# Patient Record
Sex: MALE | Race: WHITE | NOT HISPANIC OR LATINO | Employment: FULL TIME | URBAN - METROPOLITAN AREA
[De-identification: names, ages, dates, MRNs, and addresses within clinical notes are randomized per-mention and may not be internally consistent; named-entity substitution may affect disease eponyms.]

---

## 2020-09-18 DIAGNOSIS — R10.9 ABDOMINAL PAIN: Primary | ICD-10-CM

## 2020-09-29 ENCOUNTER — OFFICE VISIT (OUTPATIENT)
Dept: UROLOGY | Facility: CLINIC | Age: 55
End: 2020-09-29
Payer: MEDICAID

## 2020-09-29 VITALS
WEIGHT: 232 LBS | RESPIRATION RATE: 20 BRPM | HEART RATE: 96 BPM | DIASTOLIC BLOOD PRESSURE: 88 MMHG | HEIGHT: 68 IN | SYSTOLIC BLOOD PRESSURE: 148 MMHG | BODY MASS INDEX: 35.16 KG/M2

## 2020-09-29 DIAGNOSIS — N28.9 RENAL LESION: ICD-10-CM

## 2020-09-29 DIAGNOSIS — R30.0 DYSURIA: ICD-10-CM

## 2020-09-29 DIAGNOSIS — R31.9 HEMATURIA, UNSPECIFIED TYPE: Primary | ICD-10-CM

## 2020-09-29 LAB — POC RESIDUAL URINE VOLUME: 6 ML (ref 0–100)

## 2020-09-29 PROCEDURE — 51798 US URINE CAPACITY MEASURE: CPT | Mod: S$GLB,,, | Performed by: UROLOGY

## 2020-09-29 PROCEDURE — 51798 POCT BLADDER SCAN: ICD-10-PCS | Mod: S$GLB,,, | Performed by: UROLOGY

## 2020-09-29 PROCEDURE — 99204 PR OFFICE/OUTPT VISIT, NEW, LEVL IV, 45-59 MIN: ICD-10-PCS | Mod: S$GLB,,, | Performed by: UROLOGY

## 2020-09-29 PROCEDURE — 99204 OFFICE O/P NEW MOD 45 MIN: CPT | Mod: S$GLB,,, | Performed by: UROLOGY

## 2020-09-29 RX ORDER — SERTRALINE HYDROCHLORIDE 25 MG/1
TABLET, FILM COATED ORAL
COMMUNITY
Start: 2020-09-15 | End: 2021-01-25

## 2020-09-29 RX ORDER — AMOXICILLIN AND CLAVULANATE POTASSIUM 875; 125 MG/1; MG/1
1 TABLET, FILM COATED ORAL EVERY 12 HOURS
Qty: 14 TABLET | Refills: 0 | Status: SHIPPED | OUTPATIENT
Start: 2020-09-29 | End: 2020-12-08

## 2020-09-29 RX ORDER — ATORVASTATIN CALCIUM 20 MG/1
TABLET, FILM COATED ORAL
COMMUNITY
Start: 2020-09-15 | End: 2021-03-22

## 2020-09-29 RX ORDER — CEFTRIAXONE 500 MG/1
1 INJECTION, POWDER, FOR SOLUTION INTRAMUSCULAR; INTRAVENOUS
Status: COMPLETED | OUTPATIENT
Start: 2020-09-29 | End: 2020-09-29

## 2020-09-29 RX ORDER — GENTAMICIN SULFATE 40 MG/ML
80 INJECTION, SOLUTION INTRAMUSCULAR; INTRAVENOUS
Status: COMPLETED | OUTPATIENT
Start: 2020-09-29 | End: 2020-09-29

## 2020-09-29 RX ORDER — PANTOPRAZOLE SODIUM 20 MG/1
TABLET, DELAYED RELEASE ORAL
COMMUNITY
Start: 2020-09-15 | End: 2023-01-27

## 2020-09-29 RX ADMIN — GENTAMICIN SULFATE 80 MG: 40 INJECTION, SOLUTION INTRAMUSCULAR; INTRAVENOUS at 11:09

## 2020-09-29 RX ADMIN — CEFTRIAXONE 1 G: 500 INJECTION, POWDER, FOR SOLUTION INTRAMUSCULAR; INTRAVENOUS at 11:09

## 2020-09-29 NOTE — PROGRESS NOTES
Subjective:       Patient ID: Chucho Mack is a 55 y.o. male.    Chief Complaint: Hematuria (C/O hematuria and lower abdominal pain since January)      HPI: 5-year-old male new to the service of Dr. Cobb for evaluation of abdominal pain and gross hematuria.  Patient states he has been passing bright red blood and blood clots since April of 2020.  At that time he was throughout the entire urinary stream.  Currently his stream starts clear but at the end he sees blood and blood clots.  He presented to [facility] last week a CT scan was completed and patient was told he had a lesion on the kidney.  He denies any pain with urination but does report low abdominal pain approximately 2 in midline below the umbilicus.  Bowels are working well.  Patient has no known family history of prostate cancer.  He works as a .  He denies any previous exam involving a YESI/PSA.  He was recently treated for a UTI in completed antibiotics as prescribed.  No unexplained weight loss or new onset bone pain.         Past Medical History:   Past Medical History:   Diagnosis Date    Anxiety     GERD (gastroesophageal reflux disease)     Hypercholesterolemia     Paranoid schizophrenia        Past Surgical Historical:   Past Surgical History:   Procedure Laterality Date    ANKLE SURGERY      HAND SURGERY          Medications:   Medication List with Changes/Refills   Current Medications    ATORVASTATIN (LIPITOR) 20 MG TABLET        PANTOPRAZOLE (PROTONIX) 20 MG TABLET        SERTRALINE (ZOLOFT) 25 MG TABLET            Past Social History:   Social History     Socioeconomic History    Marital status:      Spouse name: Not on file    Number of children: Not on file    Years of education: Not on file    Highest education level: Not on file   Occupational History    Not on file   Social Needs    Financial resource strain: Not on file    Food insecurity     Worry: Not on file     Inability: Not  on file    Transportation needs     Medical: Not on file     Non-medical: Not on file   Tobacco Use    Smoking status: Current Every Day Smoker    Smokeless tobacco: Never Used   Substance and Sexual Activity    Alcohol use: Not Currently    Drug use: Not on file    Sexual activity: Not on file   Lifestyle    Physical activity     Days per week: Not on file     Minutes per session: Not on file    Stress: Not on file   Relationships    Social connections     Talks on phone: Not on file     Gets together: Not on file     Attends Synagogue service: Not on file     Active member of club or organization: Not on file     Attends meetings of clubs or organizations: Not on file     Relationship status: Not on file   Other Topics Concern    Not on file   Social History Narrative    Not on file       Allergies:   Review of patient's allergies indicates:   Allergen Reactions    Iodine and iodide containing products         Family History: History reviewed. No pertinent family history.     Review of Systems:  Review of Systems   Constitutional: Negative for activity change and appetite change.   HENT: Negative for congestion and dental problem.    Eyes: Negative for visual disturbance.   Respiratory: Negative for chest tightness and shortness of breath.    Cardiovascular: Negative for chest pain.   Gastrointestinal: Positive for abdominal pain. Negative for abdominal distention.   Genitourinary: Positive for hematuria. Negative for decreased urine volume, difficulty urinating, discharge, dysuria, enuresis, flank pain, frequency, genital sores, penile pain, penile swelling, scrotal swelling, testicular pain and urgency.   Musculoskeletal: Negative for back pain and neck pain.   Skin: Negative for color change.   Neurological: Negative for dizziness.   Hematological: Negative for adenopathy.   Psychiatric/Behavioral: Negative for agitation, behavioral problems and confusion.       Physical Exam:  Physical Exam    Constitutional: He is oriented to person, place, and time. He appears well-developed.   HENT:   Head: Normocephalic.   Eyes: No scleral icterus.   Neck: Normal range of motion.   Pulmonary/Chest: Effort normal and breath sounds normal.   Abdominal: Soft. He exhibits no distension. There is no abdominal tenderness. No hernia. Hernia confirmed negative in the right inguinal area and confirmed negative in the left inguinal area.   Genitourinary:    Testes and penis normal.   Cremasteric reflex is present.   Neurological: He is alert and oriented to person, place, and time.   Skin: Skin is warm and dry.         Assessment/Plan:   Gross hematuria/blood clots--urinalysis WBC 10-20, RBC TNTC, 2+ bacteria nitrite negative.  Culture same.  Will obtain a copy of the recent CT scan completed at [facility].  Gentamicin 80 mg IM/Rocephin 1 g IM now.  Will start patient on Augmentin b.i.d. x7 days.  Patient is scheduled for cystoscopy Friday this week in the care of Dr. Cobb.  Post completion of that pending those findings that on follow-up patient will have a PSA and YESI      Problem List Items Addressed This Visit     None      Visit Diagnoses     Hematuria, unspecified type    -  Primary    Relevant Orders    POCT Urinalysis (w/Micro Option)    POCT Bladder Scan

## 2020-09-29 NOTE — PROGRESS NOTES
56 yo male with gross hematuria.  Pt seen chart reviewed case discussed with shagufta.  Will give shot of gent and rocephin.  Place on oral antibiotic while culture is being completed.  Will track down ct results and schedule cysto for friday

## 2020-09-29 NOTE — PROGRESS NOTES
Patient given injection of Rocephin 1 gram Im to Left Glut. Tolerated without c/o  Patient given injection of Gentamicin 80mg IM to right glut. Tolerated without c/o      Noncontrast CT scan report received around 12:30 p.m. this day--identifies a partially calcified soft tissue intraluminal lesion of the urinary bladder.  Malignancy leads differential.  Patient is already scheduled for cystoscopy Friday this week.  2.  Heterogeneously enhancing/complex 2.2 cm lesion of the right kidney.  Again malignancy cannot be ruled out by this study.  3.  Bilateral renal cyst.  Hepatic stenosis and colonic diverticular disease.      Patient will be scheduled for CT renal mass protocol

## 2020-10-02 ENCOUNTER — TELEPHONE (OUTPATIENT)
Dept: UROLOGY | Facility: CLINIC | Age: 55
End: 2020-10-02

## 2020-10-02 NOTE — TELEPHONE ENCOUNTER
----- Message from Heena Childress sent at 9/30/2020  9:55 AM CDT -----  Regarding: call back  Dr. Guerrero has called in regards of ptAdela Mack and need staff to call him back regarding this pt. [phone] call no later 11:45 am  or will be available after 4:00 pm today

## 2020-12-07 ENCOUNTER — TELEPHONE (OUTPATIENT)
Dept: UROLOGY | Facility: CLINIC | Age: 55
End: 2020-12-07

## 2020-12-08 ENCOUNTER — PROCEDURE VISIT (OUTPATIENT)
Dept: UROLOGY | Facility: CLINIC | Age: 55
End: 2020-12-08
Payer: MEDICAID

## 2020-12-08 VITALS
BODY MASS INDEX: 34.1 KG/M2 | HEIGHT: 68 IN | OXYGEN SATURATION: 98 % | DIASTOLIC BLOOD PRESSURE: 100 MMHG | RESPIRATION RATE: 18 BRPM | WEIGHT: 225 LBS | SYSTOLIC BLOOD PRESSURE: 178 MMHG | HEART RATE: 90 BPM

## 2020-12-08 DIAGNOSIS — R31.9 HEMATURIA, UNSPECIFIED TYPE: Primary | ICD-10-CM

## 2020-12-08 PROCEDURE — 52000 CYSTOURETHROSCOPY: CPT | Mod: S$GLB,,, | Performed by: UROLOGY

## 2020-12-08 PROCEDURE — 52000 CYSTOSCOPY: ICD-10-PCS | Mod: S$GLB,,, | Performed by: UROLOGY

## 2020-12-08 RX ORDER — GENTAMICIN SULFATE 40 MG/ML
80 INJECTION, SOLUTION INTRAMUSCULAR; INTRAVENOUS
Status: COMPLETED | OUTPATIENT
Start: 2020-12-08 | End: 2020-12-08

## 2020-12-08 RX ORDER — CIPROFLOXACIN 500 MG/1
500 TABLET ORAL
Status: COMPLETED | OUTPATIENT
Start: 2020-12-08 | End: 2020-12-08

## 2020-12-08 RX ORDER — CEFTRIAXONE 1 G/1
1 INJECTION, POWDER, FOR SOLUTION INTRAMUSCULAR; INTRAVENOUS
Status: DISCONTINUED | OUTPATIENT
Start: 2020-12-08 | End: 2020-12-08

## 2020-12-08 RX ORDER — CEFTRIAXONE 500 MG/1
1000 INJECTION, POWDER, FOR SOLUTION INTRAMUSCULAR; INTRAVENOUS
Status: COMPLETED | OUTPATIENT
Start: 2020-12-08 | End: 2020-12-08

## 2020-12-08 RX ADMIN — CEFTRIAXONE 1000 MG: 500 INJECTION, POWDER, FOR SOLUTION INTRAMUSCULAR; INTRAVENOUS at 09:12

## 2020-12-08 RX ADMIN — CIPROFLOXACIN 500 MG: 500 TABLET ORAL at 08:12

## 2020-12-08 RX ADMIN — GENTAMICIN SULFATE 80 MG: 40 INJECTION, SOLUTION INTRAMUSCULAR; INTRAVENOUS at 09:12

## 2020-12-08 NOTE — PROCEDURES
"Cystoscopy    Date/Time: 12/8/2020 8:40 AM  Performed by: Artemio Cobb MD  Authorized by: Artemio Cobb MD     Consent Done?:  Yes (Written)  Time out: Immediately prior to procedure a "time out" was called to verify the correct patient, procedure, equipment, support staff and site/side marked as required.    Indications: hematuria    Position:  Supine  Anesthesia:  Intraurethral instillation  Patient sedated?: No    Preparation: Patient was prepped and draped in usual sterile fashion      Scope type:  Flexible cystoscope  Stent inserted: No    Stent removed: No    External exam normal: Yes    Digital exam performed: No    Urethra normal: Yes    Prostate normal: Yes  Bladder neck normal: Bladder neck normal   Bladder normal: No         Number of tumors:  1       Tumor 1:          Size (mm):  7         Anatomy:  Sessile         Location:  Posterior wall    Patient tolerance:  Patient tolerated the procedure well with no immediate complications     Patient also has suspicious renal mass but patient is allergic to iodine.  We gave one gram of rocephin--80 of gent--culture urine send cytology--5 days of cipro--fu Friday if infection has cleared will plan bladder tumor resection next week      "

## 2020-12-08 NOTE — PATIENT INSTRUCTIONS
Cystoscopy    Cystoscopy is a procedure that lets your doctor look directly inside your urethra and bladder. It can be used to:  · Help diagnose a problem with your urethra, bladder, or kidneys.  · Take a sample (biopsy) of bladder or urethral tissue.  · Treat certain problems (such as removing kidney stones).  · Place a stent to bypass an obstruction.  · Take special X-rays of the kidneys.  Based on the findings, your doctor may recommend other tests or treatments.  What is a cystoscope?  A cystoscope is a telescope-like instrument that contains lenses and fiberoptics (small glass wires that make bright light). The cystoscope may be straight and rigid, or flexible to bend around curves in the urethra. The doctor may look directly into the cystoscope, or project the image onto a monitor.  Getting ready  · Ask your doctor if you should stop taking any medicines before the procedure.  · Ask whether you should avoid eating or drinking anything after midnight before the procedure.  · Follow any other instructions your doctor gives you.  Tell your doctor before the exam if you:  · Take any medicines, such as aspirin or blood thinners  · Have allergies to any medicines  · Are pregnant   The procedure  Cystoscopy is done in the doctors office, surgery center, or hospital. The doctor and a nurse are present during the procedure. It takes only a few minutes, longer if a biopsy, X-ray, or treatment needs to be done.  During the procedure:  · You lie on an exam table on your back, knees bent and legs apart. You are covered with a drape.  · Your urethra and the area around it are washed. Anesthetic jelly may be applied to numb the urethra. Other pain medicine is usually not needed. In some cases, you may be offered a mild sedative to help you relax. If a more extensive procedure is to be done, such as a biopsy or kidney stone removal, general anesthesia may be needed.  · The cystoscope is inserted. A sterile fluid is put  into the bladder to expand it. You may feel pressure from this fluid.  · When the procedure is done, the cystoscope is removed.  After the procedure  If you had a sedative, general anesthesia, or spinal anesthesia, you must have someone drive you home. Once youre home:  · Drink plenty of fluids.  · You may have burning or light bleeding when you urinate--this is normal.  · Medicines may be prescribed to ease any discomfort or prevent infection. Take these as directed.  · Call your doctor if you have heavy bleeding or blood clots, burning that lasts more than a day, a fever over 100°F  (38° C), or trouble urinating.  Date Last Reviewed: 1/1/2017  © 3261-2707 [employer]. [address] [place] [address], [place] [address]. All rights reserved. This information is not intended as a substitute for professional medical care. Always follow your healthcare professional's instructions.

## 2020-12-10 ENCOUNTER — TELEPHONE (OUTPATIENT)
Dept: UROLOGY | Facility: CLINIC | Age: 55
End: 2020-12-10

## 2020-12-10 LAB — URINE CULTURE, ROUTINE: NORMAL

## 2020-12-11 ENCOUNTER — OFFICE VISIT (OUTPATIENT)
Dept: UROLOGY | Facility: CLINIC | Age: 55
End: 2020-12-11
Payer: MEDICAID

## 2020-12-11 VITALS — BODY MASS INDEX: 34.1 KG/M2 | RESPIRATION RATE: 20 BRPM | WEIGHT: 225 LBS | HEIGHT: 68 IN

## 2020-12-11 DIAGNOSIS — R31.9 HEMATURIA, UNSPECIFIED TYPE: Primary | ICD-10-CM

## 2020-12-11 DIAGNOSIS — N32.89 BLADDER MASS: ICD-10-CM

## 2020-12-11 DIAGNOSIS — R82.89 URINE CYTOLOGY ABNORMAL: ICD-10-CM

## 2020-12-11 LAB
ANION GAP SERPL CALC-SCNC: 9 MMOL/L (ref 3–11)
APPEARANCE, UA: ABNORMAL
BACTERIA SPEC CULT: ABNORMAL /HPF
BASOPHILS NFR BLD: 0.3 % (ref 0–3)
BILIRUB UR QL STRIP: NEGATIVE MG/DL
BUN SERPL-MCNC: 13 MG/DL (ref 7–18)
BUN/CREAT SERPL: 12.5 RATIO (ref 7–18)
CALCIUM BLD-MCNC: NEGATIVE MG/DL
CALCIUM SERPL-MCNC: 9.5 MG/DL (ref 8.8–10.5)
CHLORIDE SERPL-SCNC: 104 MMOL/L (ref 100–108)
CO2 SERPL-SCNC: 26 MMOL/L (ref 21–32)
COLOR UR: ABNORMAL
COVID-19 AB, IGM: NEGATIVE
CREAT SERPL-MCNC: 1.04 MG/DL (ref 0.7–1.3)
EOSINOPHIL NFR BLD: 1.2 % (ref 1–3)
ERYTHROCYTE [DISTWIDTH] IN BLOOD BY AUTOMATED COUNT: 13.7 % (ref 12.5–18)
GFR ESTIMATION: > 60
GLUCOSE (UA): NORMAL MG/DL
GLUCOSE SERPL-MCNC: 99 MG/DL (ref 70–110)
HCT VFR BLD AUTO: 53.9 % (ref 42–52)
HGB BLD-MCNC: 17.8 G/DL (ref 14–18)
HGB UR QL STRIP: 250 /UL
KETONES UR QL STRIP: NEGATIVE MG/DL
LEUKOCYTE ESTERASE UR QL STRIP: 25 /UL
LYMPHOCYTES NFR BLD: 31 % (ref 25–40)
MCH RBC QN AUTO: 31.8 PG (ref 27–31.2)
MCHC RBC AUTO-ENTMCNC: 33 G/DL (ref 31.8–35.4)
MCV RBC AUTO: 96.4 FL (ref 80–97)
MONOCYTES NFR BLD: 8.7 % (ref 1–15)
NEUTROPHILS # BLD AUTO: 5.97 10*3/UL (ref 1.8–7.7)
NEUTROPHILS NFR BLD: 58.4 % (ref 37–80)
NITRITE UR QL STRIP: NEGATIVE
NUCLEATED RED BLOOD CELLS: 0 %
PATIENT EMPLOYED IN HEALTHCARE: NO
PATIENT HAS SYMPTOMS FOR CONDITION OF INTEREST: NO
PATIENT HOSPITALIZED BC COND: NO
PATIENT IN CONGREGATE CARE: NO
PH UR STRIP: 5 PH (ref 5–9)
PLATELETS: 251 10*3/UL (ref 142–424)
POTASSIUM SERPL-SCNC: 4.5 MMOL/L (ref 3.6–5.2)
PROT UR QL STRIP: 100 MG/DL
RBC # BLD AUTO: 5.59 10*6/UL (ref 4.7–6.1)
RBC #/AREA URNS HPF: ABNORMAL /HPF (ref 0–2)
SERVICE COMMENT 03: ABNORMAL
SODIUM BLD-SCNC: 139 MMOL/L (ref 135–145)
SP GR UR STRIP: 1.02 (ref 1–1.03)
SPECIMEN COLLECTION METHOD, URINE: ABNORMAL
SQUAMOUS EPITHELIAL, UA: ABNORMAL /LPF
UROBILINOGEN UR STRIP-ACNC: NORMAL MG/DL
WBC # BLD: 10.2 10*3/UL (ref 4.6–10.2)
WBC #/AREA URNS HPF: ABNORMAL /HPF (ref 0–5)

## 2020-12-11 PROCEDURE — 99214 OFFICE O/P EST MOD 30 MIN: CPT | Mod: S$GLB,,, | Performed by: NURSE PRACTITIONER

## 2020-12-11 PROCEDURE — 99214 PR OFFICE/OUTPT VISIT, EST, LEVL IV, 30-39 MIN: ICD-10-PCS | Mod: S$GLB,,, | Performed by: NURSE PRACTITIONER

## 2020-12-11 NOTE — PROGRESS NOTES
Subjective:       Patient ID: Chucho Mack is a 55 y.o. male.    Chief Complaint: Hematuria (Cysto F/U)      HPI: 55-year-old male, patient Dr. Cobb, presents for follow-up cysto.  Patient underwent a cysto on 12/08/2020.  Patient presented with complaints of blood in the urine and abdominal pain.  Patient's cysto showed 7 mm mass to the posterior wall.  Patient was given Rocephin and gentamicin and started on Cipro.    Cytology showed urothelial cells in groups.  Patient presents today still having blood in his urine.  Patient states he still has abdominal pain.  Patient denies any fever.  Denies any difficulty voiding.  States his stream is good.  No other urinary complaints.  All other health problems appear stable at this time.       Past Medical History:   Past Medical History:   Diagnosis Date    Anxiety     GERD (gastroesophageal reflux disease)     Hypercholesterolemia     Paranoid schizophrenia        Past Surgical Historical:   Past Surgical History:   Procedure Laterality Date    ANKLE SURGERY      CYSTOSCOPY      HAND SURGERY          Medications:   Medication List with Changes/Refills   Current Medications    ATORVASTATIN (LIPITOR) 20 MG TABLET        PANTOPRAZOLE (PROTONIX) 20 MG TABLET        SERTRALINE (ZOLOFT) 25 MG TABLET            Past Social History:   Social History     Socioeconomic History    Marital status:      Spouse name: Not on file    Number of children: Not on file    Years of education: Not on file    Highest education level: Not on file   Occupational History    Not on file   Social Needs    Financial resource strain: Not on file    Food insecurity     Worry: Not on file     Inability: Not on file    Transportation needs     Medical: Not on file     Non-medical: Not on file   Tobacco Use    Smoking status: Current Every Day Smoker    Smokeless tobacco: Never Used   Substance and Sexual Activity    Alcohol use: Not Currently    Drug use: Not on file     Sexual activity: Not on file   Lifestyle    Physical activity     Days per week: Not on file     Minutes per session: Not on file    Stress: Not on file   Relationships    Social connections     Talks on phone: Not on file     Gets together: Not on file     Attends Latter day service: Not on file     Active member of club or organization: Not on file     Attends meetings of clubs or organizations: Not on file     Relationship status: Not on file   Other Topics Concern    Not on file   Social History Narrative    Not on file       Allergies:   Review of patient's allergies indicates:   Allergen Reactions    Iodine and iodide containing products         Family History: History reviewed. No pertinent family history.     Review of Systems:  Review of Systems   Constitutional: Negative for activity change and appetite change.   HENT: Negative for congestion and dental problem.    Eyes: Negative for visual disturbance.   Respiratory: Negative for chest tightness and shortness of breath.    Cardiovascular: Negative for chest pain.   Gastrointestinal: Positive for abdominal pain. Negative for abdominal distention.   Genitourinary: Positive for hematuria. Negative for decreased urine volume, difficulty urinating, discharge, dysuria, enuresis, flank pain, frequency, genital sores, penile pain, penile swelling, scrotal swelling, testicular pain and urgency.   Musculoskeletal: Negative for back pain and neck pain.   Skin: Negative for color change.   Neurological: Negative for dizziness.   Hematological: Negative for adenopathy.   Psychiatric/Behavioral: Negative for agitation, behavioral problems and confusion.       Physical Exam:  Physical Exam  Vitals signs and nursing note reviewed.   Constitutional:       Appearance: He is well-developed.   HENT:      Head: Normocephalic.   Eyes:      Pupils: Pupils are equal, round, and reactive to light.   Neck:      Musculoskeletal: Normal range of motion and neck supple.    Cardiovascular:      Rate and Rhythm: Normal rate and regular rhythm.      Heart sounds: Normal heart sounds.   Pulmonary:      Effort: Pulmonary effort is normal.      Breath sounds: Normal breath sounds.   Abdominal:      General: Bowel sounds are normal.      Palpations: Abdomen is soft.   Musculoskeletal: Normal range of motion.   Skin:     General: Skin is warm and dry.   Neurological:      Mental Status: He is alert and oriented to person, place, and time.   Psychiatric:         Behavior: Behavior normal.       Urinalysis:  Trace leukocytes, white blood cells too numerous to count, epithelial +1, bacteria +3.  Large blood, blood cells 30-40.    Assessment/Plan:   Bladder mass/hematuria/abnormal cytology:  Will culture the urine.  Patient will continue antibiotics as directed.  Will change antibiotics if indicated.  Patient will need excision of bladder mass.  Patient to be scheduled for surgery.    Follow-up to be arranged.  Problem List Items Addressed This Visit     None      Visit Diagnoses     Hematuria, unspecified type    -  Primary    Relevant Orders    POCT Urinalysis (w/Micro Option)    Urine cytology abnormal        Bladder mass        Relevant Orders    Ambulatory Referral to External Surgery

## 2020-12-11 NOTE — PROGRESS NOTES
Patient seen chart reviewed.  Patient has a large bladder mass and a renal mass.  Will reculture urine and schedule cysto turbt next week

## 2020-12-13 LAB
URINE CULTURE, ROUTINE: NORMAL
URINE CULTURE, ROUTINE: NORMAL

## 2020-12-17 ENCOUNTER — OUTSIDE PLACE OF SERVICE (OUTPATIENT)
Dept: UROLOGY | Facility: CLINIC | Age: 55
End: 2020-12-17

## 2020-12-17 PROCEDURE — 52240 PR CYSTOURETHROSCOPY,FULGUR >5 CM LESN: ICD-10-PCS | Mod: ,,, | Performed by: UROLOGY

## 2020-12-17 PROCEDURE — 52240 CYSTOSCOPY AND TREATMENT: CPT | Mod: ,,, | Performed by: UROLOGY

## 2020-12-18 ENCOUNTER — NURSE TRIAGE (OUTPATIENT)
Dept: ADMINISTRATIVE | Facility: CLINIC | Age: 55
End: 2020-12-18

## 2020-12-18 NOTE — TELEPHONE ENCOUNTER
Patient had surgery yesterday- had tumor removed. Sent home with catheter and bag is leaking. Requesting a new bag. Advised he would have to go to urgent care or ER to have bag replaced. Reports no bowel since surgery yesterday and just took ex-lax. Advised to call us back if laxative does not work.     Reason for Disposition   [1] Catheter is broken AND [2] is not usable    Protocols used: ST URINARY CATHETER SYMPTOMS AND [id number]

## 2020-12-22 ENCOUNTER — OFFICE VISIT (OUTPATIENT)
Dept: UROLOGY | Facility: CLINIC | Age: 55
End: 2020-12-22
Payer: MEDICAID

## 2020-12-22 VITALS — DIASTOLIC BLOOD PRESSURE: 91 MMHG | RESPIRATION RATE: 17 BRPM | SYSTOLIC BLOOD PRESSURE: 145 MMHG

## 2020-12-22 DIAGNOSIS — R30.0 DYSURIA: ICD-10-CM

## 2020-12-22 DIAGNOSIS — N32.89 BLADDER MASS: Primary | ICD-10-CM

## 2020-12-22 PROCEDURE — 99213 PR OFFICE/OUTPT VISIT, EST, LEVL III, 20-29 MIN: ICD-10-PCS | Mod: S$GLB,,, | Performed by: UROLOGY

## 2020-12-22 PROCEDURE — 99213 OFFICE O/P EST LOW 20 MIN: CPT | Mod: S$GLB,,, | Performed by: UROLOGY

## 2020-12-22 RX ORDER — OXYCODONE AND ACETAMINOPHEN 7.5; 325 MG/1; MG/1
TABLET ORAL
COMMUNITY
Start: 2020-12-17 | End: 2021-02-01

## 2020-12-22 NOTE — PROGRESS NOTES
Subjective:       Patient ID: Chucho Mack is a 55 y.o. male.    Chief Complaint: Follow-up      HPI: 54 yo fu sp turbt 8 cm tumor patient has a oneill       Past Medical History:   Past Medical History:   Diagnosis Date    Anxiety     GERD (gastroesophageal reflux disease)     Hypercholesterolemia     Paranoid schizophrenia        Past Surgical Historical:   Past Surgical History:   Procedure Laterality Date    ANKLE SURGERY      CYSTOSCOPY      HAND SURGERY          Medications:   Medication List with Changes/Refills   Current Medications    ATORVASTATIN (LIPITOR) 20 MG TABLET        OXYCODONE-ACETAMINOPHEN (PERCOCET) 7.5-325 MG PER TABLET        PANTOPRAZOLE (PROTONIX) 20 MG TABLET        SERTRALINE (ZOLOFT) 25 MG TABLET            Past Social History:   Social History     Socioeconomic History    Marital status:      Spouse name: Not on file    Number of children: Not on file    Years of education: Not on file    Highest education level: Not on file   Occupational History    Not on file   Social Needs    Financial resource strain: Not on file    Food insecurity     Worry: Not on file     Inability: Not on file    Transportation needs     Medical: Not on file     Non-medical: Not on file   Tobacco Use    Smoking status: Current Every Day Smoker    Smokeless tobacco: Never Used   Substance and Sexual Activity    Alcohol use: Not Currently    Drug use: Not on file    Sexual activity: Not on file   Lifestyle    Physical activity     Days per week: Not on file     Minutes per session: Not on file    Stress: Not on file   Relationships    Social connections     Talks on phone: Not on file     Gets together: Not on file     Attends Islam service: Not on file     Active member of club or organization: Not on file     Attends meetings of clubs or organizations: Not on file     Relationship status: Not on file   Other Topics Concern    Not on file   Social History Narrative    Not on  file       Allergies:   Review of patient's allergies indicates:   Allergen Reactions    Iodine and iodide containing products         Family History: History reviewed. No pertinent family history.     Review of Systems:  Review of Systems   Constitutional: Negative for activity change and appetite change.   HENT: Negative for congestion and dental problem.    Respiratory: Negative for chest tightness and shortness of breath.    Cardiovascular: Negative for chest pain.   Gastrointestinal: Negative for abdominal distention and abdominal pain.   Genitourinary: Negative for decreased urine volume, difficulty urinating, discharge, dysuria, enuresis, flank pain, frequency, genital sores, hematuria, penile pain, penile swelling, scrotal swelling, testicular pain and urgency.   Musculoskeletal: Negative for back pain and neck pain.   Neurological: Negative for dizziness.   Hematological: Negative for adenopathy.   Psychiatric/Behavioral: Negative for agitation, behavioral problems and confusion.       Physical Exam:  Physical Exam  Vitals signs and nursing note reviewed.   Constitutional:       Appearance: He is well-developed.   HENT:      Head: Normocephalic.   Cardiovascular:      Rate and Rhythm: Normal rate and regular rhythm.      Heart sounds: Normal heart sounds.   Pulmonary:      Effort: Pulmonary effort is normal.      Breath sounds: Normal breath sounds.   Abdominal:      General: Bowel sounds are normal.      Palpations: Abdomen is soft.   Skin:     General: Skin is warm and dry.   Neurological:      Mental Status: He is alert and oriented to person, place, and time.     ua from bag has 4 plus bacteria  Path pending    Assessment/Plan:     sp turbt will remove oneill--fu next week  Problem List Items Addressed This Visit     None

## 2020-12-25 LAB — URINE CULTURE, ROUTINE: NORMAL

## 2020-12-28 ENCOUNTER — TELEPHONE (OUTPATIENT)
Dept: UROLOGY | Facility: CLINIC | Age: 55
End: 2020-12-28

## 2020-12-28 NOTE — TELEPHONE ENCOUNTER
----- Message from Jane Rodriguez sent at 12/22/2020  2:44 PM CST -----  Regarding: COVID FU  SURGERY-12/17/2020

## 2020-12-30 ENCOUNTER — OFFICE VISIT (OUTPATIENT)
Dept: UROLOGY | Facility: CLINIC | Age: 55
End: 2020-12-30
Payer: MEDICAID

## 2020-12-30 VITALS
WEIGHT: 225 LBS | SYSTOLIC BLOOD PRESSURE: 152 MMHG | HEIGHT: 68 IN | DIASTOLIC BLOOD PRESSURE: 80 MMHG | RESPIRATION RATE: 16 BRPM | BODY MASS INDEX: 34.1 KG/M2 | HEART RATE: 84 BPM

## 2020-12-30 DIAGNOSIS — R82.90 ABNORMAL URINALYSIS: ICD-10-CM

## 2020-12-30 DIAGNOSIS — C67.9 MALIGNANT NEOPLASM OF URINARY BLADDER, UNSPECIFIED SITE: ICD-10-CM

## 2020-12-30 DIAGNOSIS — N32.89 BLADDER MASS: Primary | ICD-10-CM

## 2020-12-30 PROCEDURE — 99214 PR OFFICE/OUTPT VISIT, EST, LEVL IV, 30-39 MIN: ICD-10-PCS | Mod: S$GLB,,, | Performed by: UROLOGY

## 2020-12-30 PROCEDURE — 99214 OFFICE O/P EST MOD 30 MIN: CPT | Mod: S$GLB,,, | Performed by: UROLOGY

## 2020-12-30 NOTE — PROGRESS NOTES
Subjective:       Patient ID: Chucho Mack is a 55 y.o. male.    Chief Complaint: Bladder mass (1 week f/U)      HPI: 56 yo male fu sp turbt.  Patient with some hematuria.  Patient denies any bowel problems or history of colon cancer.       Past Medical History:   Past Medical History:   Diagnosis Date    Anxiety     GERD (gastroesophageal reflux disease)     Hypercholesterolemia     Paranoid schizophrenia        Past Surgical Historical:   Past Surgical History:   Procedure Laterality Date    ANKLE SURGERY      CYSTOSCOPY      HAND SURGERY          Medications:   Medication List with Changes/Refills   Current Medications    ATORVASTATIN (LIPITOR) 20 MG TABLET        OXYCODONE-ACETAMINOPHEN (PERCOCET) 7.5-325 MG PER TABLET        PANTOPRAZOLE (PROTONIX) 20 MG TABLET        SERTRALINE (ZOLOFT) 25 MG TABLET            Past Social History:   Social History     Socioeconomic History    Marital status:      Spouse name: Not on file    Number of children: Not on file    Years of education: Not on file    Highest education level: Not on file   Occupational History    Not on file   Social Needs    Financial resource strain: Not on file    Food insecurity     Worry: Not on file     Inability: Not on file    Transportation needs     Medical: Not on file     Non-medical: Not on file   Tobacco Use    Smoking status: Current Every Day Smoker    Smokeless tobacco: Never Used   Substance and Sexual Activity    Alcohol use: Not Currently    Drug use: Not on file    Sexual activity: Not on file   Lifestyle    Physical activity     Days per week: Not on file     Minutes per session: Not on file    Stress: Not on file   Relationships    Social connections     Talks on phone: Not on file     Gets together: Not on file     Attends Yazdanism service: Not on file     Active member of club or organization: Not on file     Attends meetings of clubs or organizations: Not on file     Relationship status: Not on  file   Other Topics Concern    Not on file   Social History Narrative    Not on file       Allergies:   Review of patient's allergies indicates:   Allergen Reactions    Iodine and iodide containing products         Family History: History reviewed. No pertinent family history.     Review of Systems:  Review of Systems   Constitutional: Negative for activity change and appetite change.   HENT: Negative for congestion and dental problem.    Eyes: Negative for visual disturbance.   Respiratory: Negative for chest tightness and shortness of breath.    Cardiovascular: Negative for chest pain.   Gastrointestinal: Negative for abdominal distention and abdominal pain.   Genitourinary: Negative for decreased urine volume, difficulty urinating, discharge, dysuria, enuresis, flank pain, frequency, genital sores, hematuria, penile pain, penile swelling, scrotal swelling, testicular pain and urgency.   Musculoskeletal: Negative for back pain and neck pain.   Skin: Negative for color change.   Neurological: Negative for dizziness.   Hematological: Negative for adenopathy.   Psychiatric/Behavioral: Negative for agitation, behavioral problems and confusion.       Physical Exam:  Physical Exam  Vitals signs and nursing note reviewed.   Constitutional:       Appearance: He is well-developed.   HENT:      Head: Normocephalic.   Eyes:      Pupils: Pupils are equal, round, and reactive to light.   Neck:      Musculoskeletal: Normal range of motion and neck supple.   Cardiovascular:      Rate and Rhythm: Normal rate and regular rhythm.      Heart sounds: Normal heart sounds.   Pulmonary:      Effort: Pulmonary effort is normal.      Breath sounds: Normal breath sounds.   Abdominal:      General: Bowel sounds are normal.      Palpations: Abdomen is soft.   Musculoskeletal: Normal range of motion.   Skin:     General: Skin is warm and dry.   Neurological:      Mental Status: He is alert and oriented to person, place, and time.    Psychiatric:         Behavior: Behavior normal.     path muscle invasive adenocarcinoma of bladder      Assessment/Plan:     muscle invasive adenocarcinoma of bladder--will order ct scaan and consult oncology  Problem List Items Addressed This Visit     None      Visit Diagnoses     Bladder mass    -  Primary    Relevant Orders    POCT Urinalysis (w/Micro Option)

## 2020-12-31 ENCOUNTER — TELEPHONE (OUTPATIENT)
Dept: HEMATOLOGY/ONCOLOGY | Facility: CLINIC | Age: 55
End: 2020-12-31

## 2020-12-31 NOTE — TELEPHONE ENCOUNTER
Called patient x 2 cannot leave message. Will continue to try to reach patient regarding appointment and referral.

## 2021-01-01 LAB — URINE CULTURE, ROUTINE: NORMAL

## 2021-01-07 ENCOUNTER — OFFICE VISIT (OUTPATIENT)
Dept: HEMATOLOGY/ONCOLOGY | Facility: CLINIC | Age: 56
End: 2021-01-07
Payer: MEDICAID

## 2021-01-07 ENCOUNTER — TELEPHONE (OUTPATIENT)
Dept: HEMATOLOGY/ONCOLOGY | Facility: CLINIC | Age: 56
End: 2021-01-07

## 2021-01-07 VITALS
WEIGHT: 225.19 LBS | SYSTOLIC BLOOD PRESSURE: 183 MMHG | HEART RATE: 89 BPM | HEIGHT: 68 IN | RESPIRATION RATE: 18 BRPM | DIASTOLIC BLOOD PRESSURE: 120 MMHG | TEMPERATURE: 98 F | OXYGEN SATURATION: 97 % | BODY MASS INDEX: 34.13 KG/M2

## 2021-01-07 DIAGNOSIS — C67.9 MALIGNANT NEOPLASM OF URINARY BLADDER, UNSPECIFIED SITE: ICD-10-CM

## 2021-01-07 DIAGNOSIS — I10 HYPERTENSION, UNSPECIFIED TYPE: Primary | ICD-10-CM

## 2021-01-07 LAB
ALBUMIN SERPL BCP-MCNC: 4.1 G/DL (ref 3.4–5)
ALBUMIN/GLOBULIN RATIO: 1.08 RATIO (ref 1.1–1.8)
ALP SERPL-CCNC: 93 U/L (ref 46–116)
ALT SERPL W P-5'-P-CCNC: 49 U/L (ref 12–78)
ANION GAP SERPL CALC-SCNC: 10 MMOL/L (ref 3–11)
AST SERPL-CCNC: 26 U/L (ref 15–37)
BASOPHILS NFR BLD: 0.4 % (ref 0–3)
BILIRUB SERPL-MCNC: 0.4 MG/DL (ref 0–1)
BUN SERPL-MCNC: 12 MG/DL (ref 7–18)
BUN/CREAT SERPL: 10.08 RATIO (ref 7–18)
CALCIUM SERPL-MCNC: 9.7 MG/DL (ref 8.8–10.5)
CHLORIDE SERPL-SCNC: 104 MMOL/L (ref 100–108)
CO2 SERPL-SCNC: 28 MMOL/L (ref 21–32)
CREAT SERPL-MCNC: 1.19 MG/DL (ref 0.7–1.3)
EOSINOPHIL NFR BLD: 0.9 % (ref 1–3)
ERYTHROCYTE [DISTWIDTH] IN BLOOD BY AUTOMATED COUNT: 13.5 % (ref 12.5–18)
GFR ESTIMATION: > 60
GLOBULIN: 3.8 G/DL (ref 2.3–3.5)
GLUCOSE SERPL-MCNC: 95 MG/DL (ref 70–110)
HCT VFR BLD AUTO: 56.5 % (ref 42–52)
HGB BLD-MCNC: 18.2 G/DL (ref 14–18)
LYMPHOCYTES NFR BLD: 29.4 % (ref 25–40)
MCH RBC QN AUTO: 31.4 PG (ref 27–31.2)
MCHC RBC AUTO-ENTMCNC: 32.2 G/DL (ref 31.8–35.4)
MCV RBC AUTO: 97.4 FL (ref 80–97)
MONOCYTES NFR BLD: 6.4 % (ref 1–15)
NEUTROPHILS # BLD AUTO: 6.16 10*3/UL (ref 1.8–7.7)
NEUTROPHILS NFR BLD: 62.3 % (ref 37–80)
NUCLEATED RED BLOOD CELLS: 0 %
PLATELETS: 248 10*3/UL (ref 142–424)
POTASSIUM SERPL-SCNC: 4.6 MMOL/L (ref 3.6–5.2)
PROT SERPL-MCNC: 7.9 G/DL (ref 6.4–8.2)
RBC # BLD AUTO: 5.8 10*6/UL (ref 4.7–6.1)
SODIUM BLD-SCNC: 142 MMOL/L (ref 135–145)
WBC # BLD: 9.9 10*3/UL (ref 4.6–10.2)

## 2021-01-07 PROCEDURE — 99205 OFFICE O/P NEW HI 60 MIN: CPT | Mod: S$GLB,,, | Performed by: INTERNAL MEDICINE

## 2021-01-07 PROCEDURE — 99205 PR OFFICE/OUTPT VISIT, NEW, LEVL V, 60-74 MIN: ICD-10-PCS | Mod: S$GLB,,, | Performed by: INTERNAL MEDICINE

## 2021-01-07 RX ORDER — AMLODIPINE BESYLATE 10 MG/1
10 TABLET ORAL DAILY
Qty: 30 TABLET | Refills: 11 | Status: SHIPPED | OUTPATIENT
Start: 2021-01-07 | End: 2023-01-27

## 2021-01-07 RX ORDER — HYDROCODONE BITARTRATE AND ACETAMINOPHEN 5; 325 MG/1; MG/1
1 TABLET ORAL EVERY 12 HOURS PRN
Qty: 45 TABLET | Refills: 0 | Status: SHIPPED | OUTPATIENT
Start: 2021-01-07 | End: 2021-03-11 | Stop reason: SDUPTHER

## 2021-01-08 LAB — Lab: 11.7 UG/L (ref 6.5–20.1)

## 2021-01-15 DIAGNOSIS — C67.9 MALIGNANT NEOPLASM OF URINARY BLADDER, UNSPECIFIED SITE: ICD-10-CM

## 2021-01-19 DIAGNOSIS — C26.9 MALIGNANT NEOPLASM OF ILL-DEFINED SITES WITHIN THE DIGESTIVE SYSTEM: ICD-10-CM

## 2021-01-21 ENCOUNTER — TELEPHONE (OUTPATIENT)
Dept: HEMATOLOGY/ONCOLOGY | Facility: CLINIC | Age: 56
End: 2021-01-21

## 2021-01-25 ENCOUNTER — OFFICE VISIT (OUTPATIENT)
Dept: HEMATOLOGY/ONCOLOGY | Facility: CLINIC | Age: 56
End: 2021-01-25
Payer: MEDICAID

## 2021-01-25 ENCOUNTER — TELEPHONE (OUTPATIENT)
Dept: HEMATOLOGY/ONCOLOGY | Facility: CLINIC | Age: 56
End: 2021-01-25

## 2021-01-25 VITALS
DIASTOLIC BLOOD PRESSURE: 80 MMHG | HEART RATE: 77 BPM | TEMPERATURE: 98 F | SYSTOLIC BLOOD PRESSURE: 132 MMHG | OXYGEN SATURATION: 98 % | HEIGHT: 68 IN | WEIGHT: 218.38 LBS | RESPIRATION RATE: 16 BRPM | BODY MASS INDEX: 33.1 KG/M2

## 2021-01-25 DIAGNOSIS — C26.9 MALIGNANT NEOPLASM OF ILL-DEFINED SITES WITHIN THE DIGESTIVE SYSTEM: Primary | ICD-10-CM

## 2021-01-25 DIAGNOSIS — C67.9 MALIGNANT NEOPLASM OF URINARY BLADDER, UNSPECIFIED SITE: ICD-10-CM

## 2021-01-25 PROCEDURE — 99214 OFFICE O/P EST MOD 30 MIN: CPT | Mod: S$GLB,,, | Performed by: INTERNAL MEDICINE

## 2021-01-25 PROCEDURE — 99214 PR OFFICE/OUTPT VISIT, EST, LEVL IV, 30-39 MIN: ICD-10-PCS | Mod: S$GLB,,, | Performed by: INTERNAL MEDICINE

## 2021-01-25 RX ORDER — OXYCODONE AND ACETAMINOPHEN 5; 325 MG/1; MG/1
1 TABLET ORAL EVERY 8 HOURS PRN
Qty: 60 TABLET | Refills: 0 | Status: SHIPPED | OUTPATIENT
Start: 2021-01-25 | End: 2021-02-24

## 2021-02-01 ENCOUNTER — OFFICE VISIT (OUTPATIENT)
Dept: SURGERY | Facility: CLINIC | Age: 56
End: 2021-02-01
Payer: MEDICAID

## 2021-02-01 DIAGNOSIS — C67.9 MALIGNANT NEOPLASM OF URINARY BLADDER, UNSPECIFIED SITE: Primary | ICD-10-CM

## 2021-02-01 PROCEDURE — 99203 OFFICE O/P NEW LOW 30 MIN: CPT | Mod: S$GLB,,, | Performed by: SURGERY

## 2021-02-01 PROCEDURE — 99203 PR OFFICE/OUTPT VISIT, NEW, LEVL III, 30-44 MIN: ICD-10-PCS | Mod: S$GLB,,, | Performed by: SURGERY

## 2021-02-04 ENCOUNTER — TELEPHONE (OUTPATIENT)
Dept: HEMATOLOGY/ONCOLOGY | Facility: CLINIC | Age: 56
End: 2021-02-04

## 2021-02-08 ENCOUNTER — TELEPHONE (OUTPATIENT)
Dept: HEMATOLOGY/ONCOLOGY | Facility: CLINIC | Age: 56
End: 2021-02-08

## 2021-02-08 LAB
ANION GAP SERPL CALC-SCNC: 9 MMOL/L (ref 3–11)
APTT PPP: 32.2 SEC (ref 23.6–36.4)
BASOPHILS NFR BLD: 0.3 % (ref 0–3)
BUN SERPL-MCNC: 11 MG/DL (ref 7–18)
BUN/CREAT SERPL: 9.64 RATIO (ref 7–18)
CALCIUM BLD-MCNC: NEGATIVE MG/DL
CALCIUM SERPL-MCNC: 9.3 MG/DL (ref 8.8–10.5)
CHLORIDE SERPL-SCNC: 104 MMOL/L (ref 100–108)
CO2 SERPL-SCNC: 29 MMOL/L (ref 21–32)
COVID-19 AB, IGM: NEGATIVE
CREAT SERPL-MCNC: 1.14 MG/DL (ref 0.7–1.3)
EOSINOPHIL NFR BLD: 2.6 % (ref 1–3)
ERYTHROCYTE [DISTWIDTH] IN BLOOD BY AUTOMATED COUNT: 14 % (ref 12.5–18)
GFR ESTIMATION: > 60
GLUCOSE SERPL-MCNC: 98 MG/DL (ref 70–110)
HCT VFR BLD AUTO: 53.2 % (ref 42–52)
HGB BLD-MCNC: 17.4 G/DL (ref 14–18)
INR PPP: 1.2 INR (ref 0.9–1.1)
LYMPHOCYTES NFR BLD: 34.9 % (ref 25–40)
MCH RBC QN AUTO: 31.7 PG (ref 27–31.2)
MCHC RBC AUTO-ENTMCNC: 32.7 G/DL (ref 31.8–35.4)
MCV RBC AUTO: 96.9 FL (ref 80–97)
MONOCYTES NFR BLD: 8.2 % (ref 1–15)
NEUTROPHILS # BLD AUTO: 4.89 10*3/UL (ref 1.8–7.7)
NEUTROPHILS NFR BLD: 53.7 % (ref 37–80)
NUCLEATED RED BLOOD CELLS: 0 %
PATIENT EMPLOYED IN HEALTHCARE: NO
PATIENT HAS SYMPTOMS FOR CONDITION OF INTEREST: NO
PATIENT HOSPITALIZED BC COND: NO
PATIENT IN CONGREGATE CARE: NO
PLATELETS: 231 10*3/UL (ref 142–424)
POTASSIUM SERPL-SCNC: 4.3 MMOL/L (ref 3.6–5.2)
PROTHROMBIN TIME: 13.7 SEC (ref 10.4–12.8)
RBC # BLD AUTO: 5.49 10*6/UL (ref 4.7–6.1)
SODIUM BLD-SCNC: 142 MMOL/L (ref 135–145)
WBC # BLD: 9.1 10*3/UL (ref 4.6–10.2)

## 2021-02-09 ENCOUNTER — TELEPHONE (OUTPATIENT)
Dept: HEMATOLOGY/ONCOLOGY | Facility: CLINIC | Age: 56
End: 2021-02-09

## 2021-03-09 ENCOUNTER — TELEPHONE (OUTPATIENT)
Dept: HEMATOLOGY/ONCOLOGY | Facility: CLINIC | Age: 56
End: 2021-03-09

## 2021-03-09 ENCOUNTER — TELEPHONE (OUTPATIENT)
Dept: UROLOGY | Facility: CLINIC | Age: 56
End: 2021-03-09

## 2021-03-09 RX ORDER — CIPROFLOXACIN 500 MG/1
500 TABLET ORAL EVERY 12 HOURS
Qty: 14 TABLET | Refills: 0 | Status: SHIPPED | OUTPATIENT
Start: 2021-03-09 | End: 2021-03-22

## 2021-03-11 ENCOUNTER — OFFICE VISIT (OUTPATIENT)
Dept: HEMATOLOGY/ONCOLOGY | Facility: CLINIC | Age: 56
End: 2021-03-11
Payer: MEDICAID

## 2021-03-11 VITALS
HEART RATE: 80 BPM | TEMPERATURE: 97 F | WEIGHT: 209.38 LBS | OXYGEN SATURATION: 98 % | RESPIRATION RATE: 16 BRPM | HEIGHT: 68 IN | SYSTOLIC BLOOD PRESSURE: 179 MMHG | DIASTOLIC BLOOD PRESSURE: 91 MMHG | BODY MASS INDEX: 31.73 KG/M2

## 2021-03-11 DIAGNOSIS — C26.9 MALIGNANT NEOPLASM OF ILL-DEFINED SITES WITHIN THE DIGESTIVE SYSTEM: ICD-10-CM

## 2021-03-11 DIAGNOSIS — C67.9 MALIGNANT NEOPLASM OF URINARY BLADDER, UNSPECIFIED SITE: ICD-10-CM

## 2021-03-11 LAB
ALBUMIN SERPL BCP-MCNC: 4 G/DL (ref 3.4–5)
ALBUMIN/GLOBULIN RATIO: 1.11 RATIO (ref 1.1–1.8)
ALP SERPL-CCNC: 92 U/L (ref 46–116)
ALT SERPL W P-5'-P-CCNC: 38 U/L (ref 12–78)
ANION GAP SERPL CALC-SCNC: 12 MMOL/L (ref 3–11)
AST SERPL-CCNC: 22 U/L (ref 15–37)
BASOPHILS NFR BLD: 0.2 % (ref 0–3)
BILIRUB SERPL-MCNC: 0.4 MG/DL (ref 0–1)
BUN SERPL-MCNC: 13 MG/DL (ref 7–18)
BUN/CREAT SERPL: 11.71 RATIO (ref 7–18)
CALCIUM BLD-MCNC: NEGATIVE MG/DL
CALCIUM SERPL-MCNC: 8.9 MG/DL (ref 8.8–10.5)
CHLORIDE SERPL-SCNC: 103 MMOL/L (ref 100–108)
CO2 SERPL-SCNC: 25 MMOL/L (ref 21–32)
COVID-19 AB, IGM: NEGATIVE
CREAT SERPL-MCNC: 1.11 MG/DL (ref 0.7–1.3)
EOSINOPHIL NFR BLD: 2.4 % (ref 1–3)
ERYTHROCYTE [DISTWIDTH] IN BLOOD BY AUTOMATED COUNT: 14.2 % (ref 12.5–18)
GFR ESTIMATION: > 60
GLOBULIN: 3.6 G/DL (ref 2.3–3.5)
GLUCOSE SERPL-MCNC: 94 MG/DL (ref 70–110)
HCT VFR BLD AUTO: 52.8 % (ref 42–52)
HGB BLD-MCNC: 17.5 G/DL (ref 14–18)
INR PPP: 1.1 INR (ref 0.9–1.1)
LYMPHOCYTES NFR BLD: 40.3 % (ref 25–40)
MCH RBC QN AUTO: 31.1 PG (ref 27–31.2)
MCHC RBC AUTO-ENTMCNC: 33.1 G/DL (ref 31.8–35.4)
MCV RBC AUTO: 94 FL (ref 80–97)
MONOCYTES NFR BLD: 8.6 % (ref 1–15)
NEUTROPHILS # BLD AUTO: 4.25 10*3/UL (ref 1.8–7.7)
NEUTROPHILS NFR BLD: 48 % (ref 37–80)
NUCLEATED RED BLOOD CELLS: 0 %
PATIENT EMPLOYED IN HEALTHCARE: NO
PATIENT HAS SYMPTOMS FOR CONDITION OF INTEREST: NO
PATIENT HOSPITALIZED BC COND: NO
PATIENT IN CONGREGATE CARE: NO
PLATELETS: 263 10*3/UL (ref 142–424)
POTASSIUM SERPL-SCNC: 4.4 MMOL/L (ref 3.6–5.2)
PROT SERPL-MCNC: 7.6 G/DL (ref 6.4–8.2)
PROTHROMBIN TIME: 12.4 SEC (ref 10.4–12.8)
RBC # BLD AUTO: 5.62 10*6/UL (ref 4.7–6.1)
SODIUM BLD-SCNC: 140 MMOL/L (ref 135–145)
WBC # BLD: 8.9 10*3/UL (ref 4.6–10.2)

## 2021-03-11 PROCEDURE — 99214 PR OFFICE/OUTPT VISIT, EST, LEVL IV, 30-39 MIN: ICD-10-PCS | Mod: S$GLB,,, | Performed by: INTERNAL MEDICINE

## 2021-03-11 PROCEDURE — 99214 OFFICE O/P EST MOD 30 MIN: CPT | Mod: S$GLB,,, | Performed by: INTERNAL MEDICINE

## 2021-03-11 RX ORDER — HYDROCODONE BITARTRATE AND ACETAMINOPHEN 5; 325 MG/1; MG/1
1 TABLET ORAL EVERY 8 HOURS PRN
Qty: 90 TABLET | Refills: 0 | Status: SHIPPED | OUTPATIENT
Start: 2021-03-11 | End: 2021-04-10

## 2021-03-16 ENCOUNTER — TELEPHONE (OUTPATIENT)
Dept: HEMATOLOGY/ONCOLOGY | Facility: CLINIC | Age: 56
End: 2021-03-16

## 2021-03-22 ENCOUNTER — TELEPHONE (OUTPATIENT)
Dept: HEMATOLOGY/ONCOLOGY | Facility: CLINIC | Age: 56
End: 2021-03-22

## 2021-03-29 ENCOUNTER — TELEPHONE (OUTPATIENT)
Dept: HEMATOLOGY/ONCOLOGY | Facility: CLINIC | Age: 56
End: 2021-03-29

## 2021-04-10 LAB — SPECIMEN TO PATHOLOGY: NORMAL

## 2021-04-12 ENCOUNTER — TELEPHONE (OUTPATIENT)
Dept: HEMATOLOGY/ONCOLOGY | Facility: CLINIC | Age: 56
End: 2021-04-12

## 2021-04-12 DIAGNOSIS — Z12.89 ENCOUNTER FOR SCREENING FOR MALIGNANT NEOPLASM OF OTHER SITES: ICD-10-CM

## 2021-04-13 ENCOUNTER — TELEPHONE (OUTPATIENT)
Dept: HEMATOLOGY/ONCOLOGY | Facility: CLINIC | Age: 56
End: 2021-04-13

## 2021-04-15 ENCOUNTER — TELEPHONE (OUTPATIENT)
Dept: HEMATOLOGY/ONCOLOGY | Facility: CLINIC | Age: 56
End: 2021-04-15

## 2021-04-16 ENCOUNTER — TELEPHONE (OUTPATIENT)
Dept: HEMATOLOGY/ONCOLOGY | Facility: CLINIC | Age: 56
End: 2021-04-16

## 2021-04-19 ENCOUNTER — TELEPHONE (OUTPATIENT)
Dept: HEMATOLOGY/ONCOLOGY | Facility: CLINIC | Age: 56
End: 2021-04-19

## 2021-04-28 ENCOUNTER — TELEPHONE (OUTPATIENT)
Dept: UROLOGY | Facility: CLINIC | Age: 56
End: 2021-04-28

## 2021-05-13 ENCOUNTER — TELEPHONE (OUTPATIENT)
Dept: HEMATOLOGY/ONCOLOGY | Facility: CLINIC | Age: 56
End: 2021-05-13

## 2021-05-17 ENCOUNTER — TELEPHONE (OUTPATIENT)
Dept: HEMATOLOGY/ONCOLOGY | Facility: CLINIC | Age: 56
End: 2021-05-17

## 2021-05-18 ENCOUNTER — TELEPHONE (OUTPATIENT)
Dept: HEMATOLOGY/ONCOLOGY | Facility: CLINIC | Age: 56
End: 2021-05-18

## 2021-05-25 ENCOUNTER — TELEPHONE (OUTPATIENT)
Dept: HEMATOLOGY/ONCOLOGY | Facility: CLINIC | Age: 56
End: 2021-05-25

## 2021-05-25 ENCOUNTER — TELEPHONE (OUTPATIENT)
Dept: SURGERY | Facility: CLINIC | Age: 56
End: 2021-05-25

## 2021-05-25 ENCOUNTER — OFFICE VISIT (OUTPATIENT)
Dept: HEMATOLOGY/ONCOLOGY | Facility: CLINIC | Age: 56
End: 2021-05-25
Payer: MEDICAID

## 2021-05-25 VITALS
DIASTOLIC BLOOD PRESSURE: 84 MMHG | WEIGHT: 206.81 LBS | RESPIRATION RATE: 16 BRPM | TEMPERATURE: 98 F | HEIGHT: 68 IN | HEART RATE: 66 BPM | OXYGEN SATURATION: 98 % | SYSTOLIC BLOOD PRESSURE: 138 MMHG | BODY MASS INDEX: 31.34 KG/M2

## 2021-05-25 DIAGNOSIS — C26.9 MALIGNANT NEOPLASM OF ILL-DEFINED SITES WITHIN THE DIGESTIVE SYSTEM: Primary | ICD-10-CM

## 2021-05-25 PROCEDURE — 99215 OFFICE O/P EST HI 40 MIN: CPT | Mod: S$GLB,,, | Performed by: INTERNAL MEDICINE

## 2021-05-25 PROCEDURE — 99215 PR OFFICE/OUTPT VISIT, EST, LEVL V, 40-54 MIN: ICD-10-PCS | Mod: S$GLB,,, | Performed by: INTERNAL MEDICINE

## 2021-05-25 RX ORDER — NITROFURANTOIN 25; 75 MG/1; MG/1
CAPSULE ORAL
COMMUNITY
Start: 2021-05-14 | End: 2023-01-27

## 2021-05-25 RX ORDER — ESCITALOPRAM OXALATE 5 MG/1
TABLET ORAL
COMMUNITY
Start: 2021-04-29 | End: 2022-03-31 | Stop reason: ALTCHOICE

## 2021-05-25 RX ORDER — HYDROXYZINE HYDROCHLORIDE 25 MG/1
TABLET, FILM COATED ORAL
COMMUNITY
Start: 2021-04-29

## 2021-05-25 RX ORDER — LISINOPRIL 5 MG/1
TABLET ORAL
COMMUNITY
Start: 2021-05-14 | End: 2023-01-27

## 2021-05-25 RX ORDER — OXYCODONE AND ACETAMINOPHEN 7.5; 325 MG/1; MG/1
1 TABLET ORAL EVERY 6 HOURS PRN
COMMUNITY
End: 2021-07-15 | Stop reason: SDUPTHER

## 2021-05-25 RX ORDER — ONDANSETRON HYDROCHLORIDE 8 MG/1
TABLET, FILM COATED ORAL
COMMUNITY
Start: 2021-04-06 | End: 2023-05-11

## 2021-05-26 ENCOUNTER — TELEPHONE (OUTPATIENT)
Dept: HEMATOLOGY/ONCOLOGY | Facility: CLINIC | Age: 56
End: 2021-05-26

## 2021-05-27 ENCOUNTER — TELEPHONE (OUTPATIENT)
Dept: HEMATOLOGY/ONCOLOGY | Facility: CLINIC | Age: 56
End: 2021-05-27

## 2021-05-28 DIAGNOSIS — C67.9 MALIGNANT NEOPLASM OF URINARY BLADDER, UNSPECIFIED SITE: Primary | ICD-10-CM

## 2021-06-01 ENCOUNTER — OFFICE VISIT (OUTPATIENT)
Dept: HEMATOLOGY/ONCOLOGY | Facility: CLINIC | Age: 56
End: 2021-06-01
Payer: MEDICAID

## 2021-06-01 VITALS
TEMPERATURE: 98 F | RESPIRATION RATE: 18 BRPM | WEIGHT: 209 LBS | HEART RATE: 68 BPM | HEIGHT: 68 IN | BODY MASS INDEX: 31.67 KG/M2 | OXYGEN SATURATION: 96 % | DIASTOLIC BLOOD PRESSURE: 80 MMHG | SYSTOLIC BLOOD PRESSURE: 124 MMHG

## 2021-06-01 DIAGNOSIS — C67.9 MALIGNANT NEOPLASM OF URINARY BLADDER, UNSPECIFIED SITE: Primary | ICD-10-CM

## 2021-06-01 LAB
ALBUMIN SERPL BCP-MCNC: 3.7 G/DL (ref 3.4–5)
ALBUMIN/GLOBULIN RATIO: 1 RATIO (ref 1.1–1.8)
ALP SERPL-CCNC: 74 U/L (ref 46–116)
ALT SERPL W P-5'-P-CCNC: 24 U/L (ref 12–78)
ANION GAP SERPL CALC-SCNC: 5 MMOL/L (ref 3–11)
AST SERPL-CCNC: 20 U/L (ref 15–37)
BASOPHILS NFR BLD: 0.2 % (ref 0–3)
BILIRUB SERPL-MCNC: 0.3 MG/DL (ref 0–1)
BUN SERPL-MCNC: 11 MG/DL (ref 7–18)
BUN/CREAT SERPL: 9.73 RATIO (ref 7–18)
CALCIUM SERPL-MCNC: 8.9 MG/DL (ref 8.8–10.5)
CHLORIDE SERPL-SCNC: 106 MMOL/L (ref 100–108)
CO2 SERPL-SCNC: 28 MMOL/L (ref 21–32)
CREAT SERPL-MCNC: 1.13 MG/DL (ref 0.7–1.3)
EOSINOPHIL NFR BLD: 1.6 % (ref 1–3)
ERYTHROCYTE [DISTWIDTH] IN BLOOD BY AUTOMATED COUNT: 14.4 % (ref 12.5–18)
GFR ESTIMATION: > 60
GLOBULIN: 3.7 G/DL (ref 2.3–3.5)
GLUCOSE SERPL-MCNC: 121 MG/DL (ref 70–110)
HCT VFR BLD AUTO: 44.7 % (ref 42–52)
HGB BLD-MCNC: 14.3 G/DL (ref 14–18)
LYMPHOCYTES NFR BLD: 28.8 % (ref 25–40)
MAGNESIUM SERPL-MCNC: 2.1 MG/DL (ref 1.8–2.4)
MCH RBC QN AUTO: 31.2 PG (ref 27–31.2)
MCHC RBC AUTO-ENTMCNC: 32 G/DL (ref 31.8–35.4)
MCV RBC AUTO: 97.4 FL (ref 80–97)
MONOCYTES NFR BLD: 6.5 % (ref 1–15)
NEUTROPHILS # BLD AUTO: 6.45 10*3/UL (ref 1.8–7.7)
NEUTROPHILS NFR BLD: 62.6 % (ref 37–80)
NUCLEATED RED BLOOD CELLS: 0 %
PHOSPHATE FLD-MCNC: 2.6 MG/DL (ref 2.6–4.7)
PLATELETS: 274 10*3/UL (ref 142–424)
POTASSIUM SERPL-SCNC: 4.3 MMOL/L (ref 3.6–5.2)
PROT SERPL-MCNC: 7.4 G/DL (ref 6.4–8.2)
PSA, DIAGNOSTIC: 1.47 NG/ML (ref 0–4)
RBC # BLD AUTO: 4.59 10*6/UL (ref 4.7–6.1)
SODIUM BLD-SCNC: 139 MMOL/L (ref 135–145)
WBC # BLD: 10.3 10*3/UL (ref 4.6–10.2)

## 2021-06-01 PROCEDURE — 99214 OFFICE O/P EST MOD 30 MIN: CPT | Mod: S$GLB,,, | Performed by: NURSE PRACTITIONER

## 2021-06-01 PROCEDURE — 99214 PR OFFICE/OUTPT VISIT, EST, LEVL IV, 30-39 MIN: ICD-10-PCS | Mod: S$GLB,,, | Performed by: NURSE PRACTITIONER

## 2021-06-03 LAB
CALCIUM BLD-MCNC: NEGATIVE MG/DL
CEA SERPL-MCNC: 8.1 NG/ML (ref 0–3)
COVID-19 AB, IGM: NEGATIVE

## 2021-06-07 RX ORDER — HEPARIN 100 UNIT/ML
500 SYRINGE INTRAVENOUS
Status: CANCELLED | OUTPATIENT
Start: 2021-06-07

## 2021-06-07 RX ORDER — HEPARIN 100 UNIT/ML
500 SYRINGE INTRAVENOUS
Status: CANCELLED | OUTPATIENT
Start: 2021-06-17

## 2021-06-07 RX ORDER — SODIUM CHLORIDE 0.9 % (FLUSH) 0.9 %
10 SYRINGE (ML) INJECTION
Status: CANCELLED | OUTPATIENT
Start: 2021-06-07

## 2021-06-07 RX ORDER — SODIUM CHLORIDE 0.9 % (FLUSH) 0.9 %
10 SYRINGE (ML) INJECTION
Status: CANCELLED | OUTPATIENT
Start: 2021-06-17

## 2021-06-08 ENCOUNTER — TELEPHONE (OUTPATIENT)
Dept: HEMATOLOGY/ONCOLOGY | Facility: CLINIC | Age: 56
End: 2021-06-08

## 2021-06-11 ENCOUNTER — TELEPHONE (OUTPATIENT)
Dept: HEMATOLOGY/ONCOLOGY | Facility: CLINIC | Age: 56
End: 2021-06-11

## 2021-06-15 ENCOUNTER — TELEPHONE (OUTPATIENT)
Dept: HEMATOLOGY/ONCOLOGY | Facility: CLINIC | Age: 56
End: 2021-06-15

## 2021-06-18 ENCOUNTER — OFFICE VISIT (OUTPATIENT)
Dept: HEMATOLOGY/ONCOLOGY | Facility: CLINIC | Age: 56
End: 2021-06-18
Payer: MEDICAID

## 2021-06-18 VITALS
RESPIRATION RATE: 16 BRPM | OXYGEN SATURATION: 96 % | HEIGHT: 68 IN | SYSTOLIC BLOOD PRESSURE: 126 MMHG | TEMPERATURE: 97 F | WEIGHT: 214.88 LBS | HEART RATE: 68 BPM | BODY MASS INDEX: 32.57 KG/M2 | DIASTOLIC BLOOD PRESSURE: 82 MMHG

## 2021-06-18 DIAGNOSIS — C67.9 MALIGNANT NEOPLASM OF URINARY BLADDER, UNSPECIFIED SITE: ICD-10-CM

## 2021-06-18 LAB
ALBUMIN SERPL BCP-MCNC: 3.3 G/DL (ref 3.4–5)
ALBUMIN/GLOBULIN RATIO: 0.92 RATIO (ref 1.1–1.8)
ALP SERPL-CCNC: 77 U/L (ref 46–116)
ALT SERPL W P-5'-P-CCNC: 32 U/L (ref 12–78)
ANION GAP SERPL CALC-SCNC: 10 MMOL/L (ref 3–11)
AST SERPL-CCNC: 15 U/L (ref 15–37)
BANDS: 11 % (ref 0–5)
BILIRUB SERPL-MCNC: 0.2 MG/DL (ref 0–1)
BUN SERPL-MCNC: 15 MG/DL (ref 7–18)
BUN/CREAT SERPL: 12.93 RATIO (ref 7–18)
CALCIUM SERPL-MCNC: 8.2 MG/DL (ref 8.8–10.5)
CELLS COUNTED: 100
CHLORIDE SERPL-SCNC: 107 MMOL/L (ref 100–108)
CO2 SERPL-SCNC: 24 MMOL/L (ref 21–32)
CREAT SERPL-MCNC: 1.16 MG/DL (ref 0.7–1.3)
ERYTHROCYTE [DISTWIDTH] IN BLOOD BY AUTOMATED COUNT: 13.7 % (ref 12.5–18)
GFR ESTIMATION: > 60
GLOBULIN: 3.6 G/DL (ref 2.3–3.5)
GLUCOSE SERPL-MCNC: 171 MG/DL (ref 70–110)
HCT VFR BLD AUTO: 36.8 % (ref 42–52)
HGB BLD-MCNC: 11.9 G/DL (ref 14–18)
LYMPHOCYTES NFR BLD: 3 % (ref 25–40)
MAGNESIUM SERPL-MCNC: 2.2 MG/DL (ref 1.8–2.4)
MCH RBC QN AUTO: 30.7 PG (ref 27–31.2)
MCHC RBC AUTO-ENTMCNC: 32.3 G/DL (ref 31.8–35.4)
MCV RBC AUTO: 94.8 FL (ref 80–97)
MONOCYTES NFR BLD: 3 % (ref 1–15)
NEUTROPHILS # BLD AUTO: 7.6 10*3/UL (ref 1.8–7.7)
NEUTROPHILS NFR BLD: 83 % (ref 37–80)
NUCLEATED RED BLOOD CELLS: 0 %
PHOSPHATE FLD-MCNC: 2.5 MG/DL (ref 2.6–4.7)
PLATELETS: 86 10*3/UL (ref 142–424)
POTASSIUM SERPL-SCNC: 4.8 MMOL/L (ref 3.6–5.2)
PROT SERPL-MCNC: 6.9 G/DL (ref 6.4–8.2)
RBC # BLD AUTO: 3.88 10*6/UL (ref 4.7–6.1)
RBC MORPH BLD: ABNORMAL
SMALL PLATELETS BLD QL SMEAR: ABNORMAL
SODIUM BLD-SCNC: 141 MMOL/L (ref 135–145)
WBC # BLD: 8.1 10*3/UL (ref 4.6–10.2)

## 2021-06-18 PROCEDURE — 99214 OFFICE O/P EST MOD 30 MIN: CPT | Mod: S$GLB,,, | Performed by: NURSE PRACTITIONER

## 2021-06-18 PROCEDURE — 99214 PR OFFICE/OUTPT VISIT, EST, LEVL IV, 30-39 MIN: ICD-10-PCS | Mod: S$GLB,,, | Performed by: NURSE PRACTITIONER

## 2021-06-18 RX ORDER — CIPROFLOXACIN 500 MG/1
TABLET ORAL
COMMUNITY
Start: 2021-06-15 | End: 2022-12-28

## 2021-06-30 ENCOUNTER — TELEPHONE (OUTPATIENT)
Dept: HEMATOLOGY/ONCOLOGY | Facility: CLINIC | Age: 56
End: 2021-06-30

## 2021-07-06 ENCOUNTER — CLINICAL SUPPORT (OUTPATIENT)
Dept: HEMATOLOGY/ONCOLOGY | Facility: CLINIC | Age: 56
End: 2021-07-06
Payer: MEDICAID

## 2021-07-06 ENCOUNTER — OFFICE VISIT (OUTPATIENT)
Dept: HEMATOLOGY/ONCOLOGY | Facility: CLINIC | Age: 56
End: 2021-07-06
Payer: MEDICAID

## 2021-07-06 VITALS
BODY MASS INDEX: 31.54 KG/M2 | TEMPERATURE: 98 F | SYSTOLIC BLOOD PRESSURE: 120 MMHG | WEIGHT: 208.13 LBS | RESPIRATION RATE: 18 BRPM | HEART RATE: 86 BPM | HEIGHT: 68 IN | DIASTOLIC BLOOD PRESSURE: 85 MMHG | OXYGEN SATURATION: 98 %

## 2021-07-06 DIAGNOSIS — Z51.11 ENCOUNTER FOR ANTINEOPLASTIC CHEMOTHERAPY: ICD-10-CM

## 2021-07-06 DIAGNOSIS — C67.9 MALIGNANT NEOPLASM OF URINARY BLADDER, UNSPECIFIED SITE: Primary | ICD-10-CM

## 2021-07-06 PROCEDURE — 99214 OFFICE O/P EST MOD 30 MIN: CPT | Mod: S$GLB,,, | Performed by: NURSE PRACTITIONER

## 2021-07-06 PROCEDURE — 99214 PR OFFICE/OUTPT VISIT, EST, LEVL IV, 30-39 MIN: ICD-10-PCS | Mod: S$GLB,,, | Performed by: NURSE PRACTITIONER

## 2021-07-06 RX ORDER — SODIUM CHLORIDE 0.9 % (FLUSH) 0.9 %
10 SYRINGE (ML) INJECTION
Status: CANCELLED | OUTPATIENT
Start: 2021-07-08

## 2021-07-06 RX ORDER — SODIUM CHLORIDE 0.9 % (FLUSH) 0.9 %
10 SYRINGE (ML) INJECTION
Status: CANCELLED | OUTPATIENT
Start: 2021-07-15

## 2021-07-06 RX ORDER — HEPARIN 100 UNIT/ML
500 SYRINGE INTRAVENOUS
Status: CANCELLED | OUTPATIENT
Start: 2021-07-08

## 2021-07-06 RX ORDER — HEPARIN 100 UNIT/ML
500 SYRINGE INTRAVENOUS
Status: CANCELLED | OUTPATIENT
Start: 2021-07-15

## 2021-07-15 DIAGNOSIS — C67.9 MALIGNANT NEOPLASM OF URINARY BLADDER, UNSPECIFIED SITE: Primary | ICD-10-CM

## 2021-07-15 RX ORDER — OXYCODONE AND ACETAMINOPHEN 7.5; 325 MG/1; MG/1
1 TABLET ORAL EVERY 8 HOURS PRN
Qty: 90 TABLET | Refills: 0 | Status: SHIPPED | OUTPATIENT
Start: 2021-07-15 | End: 2021-08-12 | Stop reason: SDUPTHER

## 2021-07-29 ENCOUNTER — OFFICE VISIT (OUTPATIENT)
Dept: HEMATOLOGY/ONCOLOGY | Facility: CLINIC | Age: 56
End: 2021-07-29

## 2021-07-29 VITALS
RESPIRATION RATE: 18 BRPM | BODY MASS INDEX: 31.37 KG/M2 | DIASTOLIC BLOOD PRESSURE: 91 MMHG | TEMPERATURE: 98 F | HEIGHT: 68 IN | WEIGHT: 207 LBS | HEART RATE: 65 BPM | OXYGEN SATURATION: 95 % | SYSTOLIC BLOOD PRESSURE: 145 MMHG

## 2021-07-29 DIAGNOSIS — Z51.11 ENCOUNTER FOR ANTINEOPLASTIC CHEMOTHERAPY: ICD-10-CM

## 2021-07-29 DIAGNOSIS — C67.9 MALIGNANT NEOPLASM OF URINARY BLADDER, UNSPECIFIED SITE: Primary | ICD-10-CM

## 2021-07-29 PROCEDURE — 99214 OFFICE O/P EST MOD 30 MIN: CPT | Mod: S$GLB,,, | Performed by: NURSE PRACTITIONER

## 2021-07-29 PROCEDURE — 99214 PR OFFICE/OUTPT VISIT, EST, LEVL IV, 30-39 MIN: ICD-10-PCS | Mod: S$GLB,,, | Performed by: NURSE PRACTITIONER

## 2021-07-29 RX ORDER — SODIUM CHLORIDE 0.9 % (FLUSH) 0.9 %
10 SYRINGE (ML) INJECTION
Status: CANCELLED | OUTPATIENT
Start: 2021-08-10

## 2021-07-29 RX ORDER — SODIUM CHLORIDE 0.9 % (FLUSH) 0.9 %
10 SYRINGE (ML) INJECTION
Status: CANCELLED | OUTPATIENT
Start: 2021-07-29

## 2021-07-29 RX ORDER — HEPARIN 100 UNIT/ML
500 SYRINGE INTRAVENOUS
Status: CANCELLED | OUTPATIENT
Start: 2021-08-10

## 2021-07-29 RX ORDER — HEPARIN 100 UNIT/ML
500 SYRINGE INTRAVENOUS
Status: CANCELLED | OUTPATIENT
Start: 2021-07-29

## 2021-07-29 RX ORDER — OXYBUTYNIN CHLORIDE 5 MG/1
TABLET, EXTENDED RELEASE ORAL
COMMUNITY
Start: 2021-07-22 | End: 2023-01-27

## 2021-08-02 ENCOUNTER — TELEPHONE (OUTPATIENT)
Dept: HEMATOLOGY/ONCOLOGY | Facility: CLINIC | Age: 56
End: 2021-08-02

## 2021-08-06 ENCOUNTER — TELEPHONE (OUTPATIENT)
Dept: HEMATOLOGY/ONCOLOGY | Facility: CLINIC | Age: 56
End: 2021-08-06

## 2021-08-10 ENCOUNTER — OFFICE VISIT (OUTPATIENT)
Dept: HEMATOLOGY/ONCOLOGY | Facility: CLINIC | Age: 56
End: 2021-08-10
Payer: MEDICAID

## 2021-08-10 VITALS
DIASTOLIC BLOOD PRESSURE: 95 MMHG | WEIGHT: 200 LBS | HEIGHT: 68 IN | SYSTOLIC BLOOD PRESSURE: 144 MMHG | RESPIRATION RATE: 16 BRPM | BODY MASS INDEX: 30.31 KG/M2 | HEART RATE: 71 BPM | OXYGEN SATURATION: 97 % | TEMPERATURE: 98 F

## 2021-08-10 DIAGNOSIS — C67.9 MALIGNANT NEOPLASM OF URINARY BLADDER, UNSPECIFIED SITE: Primary | ICD-10-CM

## 2021-08-10 LAB
BANDS: 2 % (ref 0–5)
CELLS COUNTED: 100
LYMPHOCYTES NFR BLD: 20 % (ref 25–40)
MONOCYTES NFR BLD: 6 % (ref 1–15)
NEUTROPHILS # BLD AUTO: 2.6 10*3/UL (ref 1.8–7.7)
NEUTROPHILS NFR BLD: 72 % (ref 37–80)
RBC MORPH BLD: ABNORMAL
SMALL PLATELETS BLD QL SMEAR: ADEQUATE

## 2021-08-10 PROCEDURE — 99214 PR OFFICE/OUTPT VISIT, EST, LEVL IV, 30-39 MIN: ICD-10-PCS | Mod: S$GLB,,, | Performed by: NURSE PRACTITIONER

## 2021-08-10 PROCEDURE — 99214 OFFICE O/P EST MOD 30 MIN: CPT | Mod: S$GLB,,, | Performed by: NURSE PRACTITIONER

## 2021-08-12 ENCOUNTER — TELEPHONE (OUTPATIENT)
Dept: HEMATOLOGY/ONCOLOGY | Facility: CLINIC | Age: 56
End: 2021-08-12

## 2021-08-12 DIAGNOSIS — C67.9 MALIGNANT NEOPLASM OF URINARY BLADDER, UNSPECIFIED SITE: ICD-10-CM

## 2021-08-12 RX ORDER — OXYCODONE AND ACETAMINOPHEN 7.5; 325 MG/1; MG/1
1 TABLET ORAL EVERY 8 HOURS PRN
Qty: 90 TABLET | Refills: 0 | Status: SHIPPED | OUTPATIENT
Start: 2021-08-12 | End: 2021-09-08 | Stop reason: SDUPTHER

## 2021-08-24 ENCOUNTER — TELEPHONE (OUTPATIENT)
Dept: HEMATOLOGY/ONCOLOGY | Facility: CLINIC | Age: 56
End: 2021-08-24

## 2021-09-08 ENCOUNTER — OFFICE VISIT (OUTPATIENT)
Dept: HEMATOLOGY/ONCOLOGY | Facility: CLINIC | Age: 56
End: 2021-09-08
Payer: MEDICAID

## 2021-09-08 VITALS
BODY MASS INDEX: 31.09 KG/M2 | SYSTOLIC BLOOD PRESSURE: 150 MMHG | TEMPERATURE: 98 F | DIASTOLIC BLOOD PRESSURE: 90 MMHG | HEART RATE: 69 BPM | OXYGEN SATURATION: 96 % | HEIGHT: 68 IN | RESPIRATION RATE: 16 BRPM | WEIGHT: 205.13 LBS

## 2021-09-08 DIAGNOSIS — C67.9 MALIGNANT NEOPLASM OF URINARY BLADDER, UNSPECIFIED SITE: ICD-10-CM

## 2021-09-08 LAB — ANISOCYTOSIS: NORMAL

## 2021-09-08 PROCEDURE — 99214 OFFICE O/P EST MOD 30 MIN: CPT | Mod: S$GLB,,, | Performed by: NURSE PRACTITIONER

## 2021-09-08 PROCEDURE — 99214 PR OFFICE/OUTPT VISIT, EST, LEVL IV, 30-39 MIN: ICD-10-PCS | Mod: S$GLB,,, | Performed by: NURSE PRACTITIONER

## 2021-09-08 RX ORDER — HEPARIN 100 UNIT/ML
500 SYRINGE INTRAVENOUS
Status: CANCELLED | OUTPATIENT
Start: 2021-09-21

## 2021-09-08 RX ORDER — SODIUM CHLORIDE 0.9 % (FLUSH) 0.9 %
10 SYRINGE (ML) INJECTION
Status: CANCELLED | OUTPATIENT
Start: 2021-09-14

## 2021-09-08 RX ORDER — OXYCODONE AND ACETAMINOPHEN 7.5; 325 MG/1; MG/1
1 TABLET ORAL EVERY 8 HOURS PRN
Qty: 90 TABLET | Refills: 0 | Status: SHIPPED | OUTPATIENT
Start: 2021-09-08 | End: 2021-09-29

## 2021-09-08 RX ORDER — HEPARIN 100 UNIT/ML
500 SYRINGE INTRAVENOUS
Status: CANCELLED | OUTPATIENT
Start: 2021-09-14

## 2021-09-08 RX ORDER — SODIUM CHLORIDE 0.9 % (FLUSH) 0.9 %
10 SYRINGE (ML) INJECTION
Status: CANCELLED | OUTPATIENT
Start: 2021-09-21

## 2021-09-24 ENCOUNTER — TELEPHONE (OUTPATIENT)
Dept: HEMATOLOGY/ONCOLOGY | Facility: CLINIC | Age: 56
End: 2021-09-24

## 2021-09-29 ENCOUNTER — OFFICE VISIT (OUTPATIENT)
Dept: HEMATOLOGY/ONCOLOGY | Facility: CLINIC | Age: 56
End: 2021-09-29
Payer: MEDICAID

## 2021-09-29 VITALS
OXYGEN SATURATION: 95 % | WEIGHT: 200.69 LBS | DIASTOLIC BLOOD PRESSURE: 101 MMHG | HEART RATE: 73 BPM | BODY MASS INDEX: 30.42 KG/M2 | TEMPERATURE: 99 F | SYSTOLIC BLOOD PRESSURE: 155 MMHG | HEIGHT: 68 IN | RESPIRATION RATE: 16 BRPM

## 2021-09-29 DIAGNOSIS — C67.9 MALIGNANT NEOPLASM OF URINARY BLADDER, UNSPECIFIED SITE: Primary | ICD-10-CM

## 2021-09-29 PROCEDURE — 99214 PR OFFICE/OUTPT VISIT, EST, LEVL IV, 30-39 MIN: ICD-10-PCS | Mod: S$GLB,,, | Performed by: NURSE PRACTITIONER

## 2021-09-29 PROCEDURE — 99214 OFFICE O/P EST MOD 30 MIN: CPT | Mod: S$GLB,,, | Performed by: NURSE PRACTITIONER

## 2021-09-29 RX ORDER — HYDROCODONE BITARTRATE AND ACETAMINOPHEN 5; 325 MG/1; MG/1
1 TABLET ORAL EVERY 6 HOURS PRN
Qty: 30 TABLET | Refills: 0 | Status: SHIPPED | OUTPATIENT
Start: 2021-09-29 | End: 2022-05-05

## 2021-10-04 ENCOUNTER — TELEPHONE (OUTPATIENT)
Dept: HEMATOLOGY/ONCOLOGY | Facility: CLINIC | Age: 56
End: 2021-10-04

## 2022-02-16 ENCOUNTER — TELEPHONE (OUTPATIENT)
Dept: GASTROENTEROLOGY | Facility: CLINIC | Age: 57
End: 2022-02-16
Payer: MEDICAID

## 2022-02-16 NOTE — TELEPHONE ENCOUNTER
----- Message from Nahomi Casey sent at 2/16/2022  3:35 PM CST -----  Regarding: Advice  Contact: Patient  Patient would like the nurse to give him a call back concerning his procedure at  [phone] (home) and what the cardiologist said.

## 2022-02-22 ENCOUNTER — TELEPHONE (OUTPATIENT)
Dept: GASTROENTEROLOGY | Facility: CLINIC | Age: 57
End: 2022-02-22
Payer: MEDICAID

## 2022-02-22 NOTE — TELEPHONE ENCOUNTER
----- Message from Marimar Nance sent at 2/22/2022 10:40 AM CST -----  Contact: Patient  Patient returning a missed call from the office      Call back #   [phone]

## 2022-02-23 NOTE — TELEPHONE ENCOUNTER
Patient called very upset that no one has called him back to schedule his procedure. Please call him back at   [phone]. Thank you! - as

## 2022-02-24 ENCOUNTER — TELEPHONE (OUTPATIENT)
Dept: GASTROENTEROLOGY | Facility: CLINIC | Age: 57
End: 2022-02-24
Payer: MEDICAID

## 2022-02-24 NOTE — TELEPHONE ENCOUNTER
----- Message from Callie Gonsalves sent at 2/24/2022 10:09 AM CST -----  Contact: self  Type:  Patient Returning Call    Who Called: Chucho Mack   Who Left Message for Patient: Estefany  Does the patient know what this is regarding?: Scheduling   Would the patient rather a call back or a response via [facility]?  Call back  Best Call Back Number: [phone]   Additional Information: States he's waiting by the phone for your call.

## 2022-02-25 NOTE — TELEPHONE ENCOUNTER
----- Message from Callie Gonsalves sent at 2/25/2022  8:13 AM CST -----  Contact: self  Type:  Patient Returning Call    Who Called: Chucho Mack   Who Left Message for Patient: :Estefany  Does the patient know what this is regarding?: Scheduling  Would the patient rather a call back or a response via [facility]? Call back   Best Call Back Number: [phone]   Additional Information: n/a

## 2022-03-31 ENCOUNTER — OFFICE VISIT (OUTPATIENT)
Dept: UROLOGY | Facility: CLINIC | Age: 57
End: 2022-03-31
Payer: MEDICAID

## 2022-03-31 VITALS — BODY MASS INDEX: 35.46 KG/M2 | RESPIRATION RATE: 20 BRPM | WEIGHT: 234 LBS | HEIGHT: 68 IN

## 2022-03-31 DIAGNOSIS — C67.9 MALIGNANT NEOPLASM OF URINARY BLADDER, UNSPECIFIED SITE: Primary | ICD-10-CM

## 2022-03-31 LAB — POC RESIDUAL URINE VOLUME: 4 ML (ref 0–100)

## 2022-03-31 PROCEDURE — 99213 PR OFFICE/OUTPT VISIT, EST, LEVL III, 20-29 MIN: ICD-10-PCS | Mod: S$GLB,,, | Performed by: NURSE PRACTITIONER

## 2022-03-31 PROCEDURE — 51798 POCT BLADDER SCAN: ICD-10-PCS | Mod: S$GLB,,, | Performed by: NURSE PRACTITIONER

## 2022-03-31 PROCEDURE — 1159F PR MEDICATION LIST DOCUMENTED IN MEDICAL RECORD: ICD-10-PCS | Mod: CPTII,S$GLB,, | Performed by: NURSE PRACTITIONER

## 2022-03-31 PROCEDURE — 3008F PR BODY MASS INDEX (BMI) DOCUMENTED: ICD-10-PCS | Mod: CPTII,S$GLB,, | Performed by: NURSE PRACTITIONER

## 2022-03-31 PROCEDURE — 3008F BODY MASS INDEX DOCD: CPT | Mod: CPTII,S$GLB,, | Performed by: NURSE PRACTITIONER

## 2022-03-31 PROCEDURE — 51798 US URINE CAPACITY MEASURE: CPT | Mod: S$GLB,,, | Performed by: NURSE PRACTITIONER

## 2022-03-31 PROCEDURE — 99213 OFFICE O/P EST LOW 20 MIN: CPT | Mod: S$GLB,,, | Performed by: NURSE PRACTITIONER

## 2022-03-31 PROCEDURE — 1160F RVW MEDS BY RX/DR IN RCRD: CPT | Mod: CPTII,S$GLB,, | Performed by: NURSE PRACTITIONER

## 2022-03-31 PROCEDURE — 1159F MED LIST DOCD IN RCRD: CPT | Mod: CPTII,S$GLB,, | Performed by: NURSE PRACTITIONER

## 2022-03-31 PROCEDURE — 4010F PR ACE/ARB THEARPY RXD/TAKEN: ICD-10-PCS | Mod: CPTII,S$GLB,, | Performed by: NURSE PRACTITIONER

## 2022-03-31 PROCEDURE — 1160F PR REVIEW ALL MEDS BY PRESCRIBER/CLIN PHARMACIST DOCUMENTED: ICD-10-PCS | Mod: CPTII,S$GLB,, | Performed by: NURSE PRACTITIONER

## 2022-03-31 PROCEDURE — 4010F ACE/ARB THERAPY RXD/TAKEN: CPT | Mod: CPTII,S$GLB,, | Performed by: NURSE PRACTITIONER

## 2022-03-31 RX ORDER — VENLAFAXINE HYDROCHLORIDE 75 MG/1
CAPSULE, EXTENDED RELEASE ORAL
COMMUNITY
Start: 2022-03-25 | End: 2023-01-27

## 2022-03-31 NOTE — PROGRESS NOTES
Subjective:       Patient ID: Chucho Mack is a 57 y.o. male.    Chief Complaint: Follow-up, Bladder cancer, and Micro Hematuria      HPI: 57-year-old male, established patient, last seen December 2020.  Patient has a history of bladder cancer.  Patient had a TURBT of a 8 cm tumor in December of 2020 with Dr. Cobb..  Path report showed invasive adenocarcinoma.  Tumor invaded muscular propria.  Patient is being treated with with oncology.    Patient has not had cysto since TURBT.    The patient denies any pain or burning urination.  Denies any difficulty voiding.  States he has a good stream start to finish.  Denies any frequency urgency.  Denies seeing any blood in urine.  Denies any odor urine.  Denies any fever.  Denies any significant weight loss.  No other urinary complaints at this time.       Past Medical History:   Past Medical History:   Diagnosis Date    Allergy     Anxiety     Bladder cancer     Bladder cancer 1/25/2021    Depression     GERD (gastroesophageal reflux disease)     Hypercholesterolemia     Paranoid schizophrenia        Past Surgical Historical:   Past Surgical History:   Procedure Laterality Date    ANKLE SURGERY      CORONARY ANGIOPLASTY WITH STENT PLACEMENT      CYSTOSCOPY      FOOT FRACTURE SURGERY      HAND SURGERY      WRIST SURGERY          Medications:   Medication List with Changes/Refills   Current Medications    AMLODIPINE (NORVASC) 10 MG TABLET    Take 1 tablet (10 mg total) by mouth once daily.    CIPROFLOXACIN HCL (CIPRO) 500 MG TABLET        HYDROCODONE-ACETAMINOPHEN (NORCO) 5-325 MG PER TABLET    Take 1 tablet by mouth every 6 (six) hours as needed for Pain.    HYDROXYZINE HCL (ATARAX) 25 MG TABLET        LISINOPRIL (PRINIVIL,ZESTRIL) 5 MG TABLET        METOPROLOL TARTRATE (LOPRESSOR) 25 MG TABLET        NITROFURANTOIN, MACROCRYSTAL-MONOHYDRATE, (MACROBID) 100 MG CAPSULE        ONDANSETRON (ZOFRAN) 8 MG TABLET        OXYBUTYNIN (DITROPAN-XL) 5 MG TR24         PANTOPRAZOLE (PROTONIX) 20 MG TABLET        PRASUGREL (EFFIENT) 10 MG TAB    Take 1 tablet by mouth once daily.    ROSUVASTATIN (CRESTOR) 20 MG TABLET        VENLAFAXINE (EFFEXOR-XR) 75 MG 24 HR CAPSULE       Discontinued Medications    ESCITALOPRAM OXALATE (LEXAPRO) 5 MG TAB            Past Social History:   Social History     Socioeconomic History    Marital status:    Tobacco Use    Smoking status: Former Smoker     Packs/day: 0.50     Years: 44.00     Pack years: 22.00     Types: Cigarettes     Start date: 1977     Quit date: 2020     Years since quittin.2    Smokeless tobacco: Never Used   Substance and Sexual Activity    Alcohol use: Not Currently    Drug use: Never       Allergies:   Review of patient's allergies indicates:   Allergen Reactions    Iodine and iodide containing products         Family History:   Family History   Problem Relation Age of Onset    Coronary artery disease Mother     No Known Problems Father     No Known Problems Sister     Coronary artery disease Brother     Cancer Maternal Aunt     No Known Problems Maternal Uncle     Cancer Maternal Grandmother     Skin cancer Maternal Grandmother     No Known Problems Sister     No Known Problems Sister     No Known Problems Sister     No Known Problems Sister     Coronary artery disease Brother     No Known Problems Maternal Uncle     No Known Problems Maternal Uncle     No Known Problems Maternal Uncle     No Known Problems Maternal Uncle     No Known Problems Maternal Uncle     No Known Problems Maternal Uncle         Review of Systems:  Review of Systems   Constitutional: Negative for activity change and appetite change.   HENT: Negative for congestion and dental problem.    Respiratory: Negative for chest tightness and shortness of breath.    Cardiovascular: Negative for chest pain.   Gastrointestinal: Negative for abdominal distention and abdominal pain.   Genitourinary: Negative for decreased  urine volume, difficulty urinating, dysuria, enuresis, flank pain, frequency, genital sores, hematuria, penile discharge, penile pain, penile swelling, scrotal swelling, testicular pain and urgency.   Musculoskeletal: Negative for back pain and neck pain.   Neurological: Negative for dizziness.   Hematological: Negative for adenopathy.   Psychiatric/Behavioral: Negative for agitation, behavioral problems and confusion.       Physical Exam:  Physical Exam  Vitals and nursing note reviewed.   Constitutional:       Appearance: He is well-developed.   HENT:      Head: Normocephalic.   Cardiovascular:      Rate and Rhythm: Normal rate and regular rhythm.      Heart sounds: Normal heart sounds.   Pulmonary:      Effort: Pulmonary effort is normal.      Breath sounds: Normal breath sounds.   Abdominal:      General: Bowel sounds are normal.      Palpations: Abdomen is soft.   Skin:     General: Skin is warm and dry.   Neurological:      Mental Status: He is alert and oriented to person, place, and time.       Urinalysis:  Moderate blood, blood cells 15-20.    Assessment/Plan:   Bladder cancer:  Will schedule patient for repeat cysto.  Patient states he has been treated with chemo.  States he is feeling well at this time.  No visible blood.      Follow-up to be arrange.  Problem List Items Addressed This Visit        Oncology    Bladder cancer - Primary    Relevant Orders    POCT Urinalysis (w/Micro Option)    POCT Bladder Scan (Completed)    Cystoscopy

## 2022-04-04 ENCOUNTER — TELEPHONE (OUTPATIENT)
Dept: UROLOGY | Facility: CLINIC | Age: 57
End: 2022-04-04
Payer: MEDICAID

## 2022-04-05 ENCOUNTER — TELEPHONE (OUTPATIENT)
Dept: GASTROENTEROLOGY | Facility: CLINIC | Age: 57
End: 2022-04-05
Payer: MEDICAID

## 2022-04-28 ENCOUNTER — TELEPHONE (OUTPATIENT)
Dept: GASTROENTEROLOGY | Facility: CLINIC | Age: 57
End: 2022-04-28
Payer: MEDICAID

## 2022-04-28 NOTE — TELEPHONE ENCOUNTER
----- Message from Cydney Godinez sent at 4/28/2022 10:55 AM CDT -----  Pt is EP of dr davila, wanting to schedule lower GI. Call back is [phone]

## 2022-04-28 NOTE — TELEPHONE ENCOUNTER
Patient is now ready  to schedule from last OV with the cardiac clearance? Okay to proceed? -JANET

## 2022-05-04 NOTE — TELEPHONE ENCOUNTER
Can he come in for an OV with [facility] tomorrow at 10am? We will need him to bring all of his medicine bottles with him as there were discrepancies last visit. Dr. Hari Rios will need to provide an uptodate clearance (12/2021 letter stated the patient needed to continue Prasugrel and ASA given stent placed 3/2021).   NBP

## 2022-05-05 ENCOUNTER — OFFICE VISIT (OUTPATIENT)
Dept: GASTROENTEROLOGY | Facility: CLINIC | Age: 57
End: 2022-05-05
Payer: MEDICAID

## 2022-05-05 VITALS
DIASTOLIC BLOOD PRESSURE: 91 MMHG | WEIGHT: 249 LBS | HEART RATE: 82 BPM | SYSTOLIC BLOOD PRESSURE: 137 MMHG | OXYGEN SATURATION: 93 % | BODY MASS INDEX: 37.74 KG/M2 | HEIGHT: 68 IN

## 2022-05-05 DIAGNOSIS — K21.9 GASTROESOPHAGEAL REFLUX DISEASE, UNSPECIFIED WHETHER ESOPHAGITIS PRESENT: ICD-10-CM

## 2022-05-05 DIAGNOSIS — Z87.11 PERSONAL HISTORY OF PEPTIC ULCER DISEASE: ICD-10-CM

## 2022-05-05 DIAGNOSIS — Z86.010 HISTORY OF COLON POLYPS: ICD-10-CM

## 2022-05-05 DIAGNOSIS — Z79.02 LONG TERM (CURRENT) USE OF ANTITHROMBOTICS/ANTIPLATELETS: Primary | ICD-10-CM

## 2022-05-05 PROCEDURE — 3075F PR MOST RECENT SYSTOLIC BLOOD PRESS GE 130-139MM HG: ICD-10-PCS | Mod: CPTII,S$GLB,,

## 2022-05-05 PROCEDURE — 4010F PR ACE/ARB THEARPY RXD/TAKEN: ICD-10-PCS | Mod: CPTII,S$GLB,,

## 2022-05-05 PROCEDURE — 3075F SYST BP GE 130 - 139MM HG: CPT | Mod: CPTII,S$GLB,,

## 2022-05-05 PROCEDURE — 3080F PR MOST RECENT DIASTOLIC BLOOD PRESSURE >= 90 MM HG: ICD-10-PCS | Mod: CPTII,S$GLB,,

## 2022-05-05 PROCEDURE — 99213 OFFICE O/P EST LOW 20 MIN: CPT | Mod: S$GLB,,,

## 2022-05-05 PROCEDURE — 1159F MED LIST DOCD IN RCRD: CPT | Mod: CPTII,S$GLB,,

## 2022-05-05 PROCEDURE — 3008F BODY MASS INDEX DOCD: CPT | Mod: CPTII,S$GLB,,

## 2022-05-05 PROCEDURE — 3008F PR BODY MASS INDEX (BMI) DOCUMENTED: ICD-10-PCS | Mod: CPTII,S$GLB,,

## 2022-05-05 PROCEDURE — 99213 PR OFFICE/OUTPT VISIT, EST, LEVL III, 20-29 MIN: ICD-10-PCS | Mod: S$GLB,,,

## 2022-05-05 PROCEDURE — 1160F PR REVIEW ALL MEDS BY PRESCRIBER/CLIN PHARMACIST DOCUMENTED: ICD-10-PCS | Mod: CPTII,S$GLB,,

## 2022-05-05 PROCEDURE — 3080F DIAST BP >= 90 MM HG: CPT | Mod: CPTII,S$GLB,,

## 2022-05-05 PROCEDURE — 4010F ACE/ARB THERAPY RXD/TAKEN: CPT | Mod: CPTII,S$GLB,,

## 2022-05-05 PROCEDURE — 1159F PR MEDICATION LIST DOCUMENTED IN MEDICAL RECORD: ICD-10-PCS | Mod: CPTII,S$GLB,,

## 2022-05-05 PROCEDURE — 1160F RVW MEDS BY RX/DR IN RCRD: CPT | Mod: CPTII,S$GLB,,

## 2022-05-05 RX ORDER — POLYETHYLENE GLYCOL 3350, SODIUM SULFATE ANHYDROUS, SODIUM BICARBONATE, SODIUM CHLORIDE, POTASSIUM CHLORIDE 236; 22.74; 6.74; 5.86; 2.97 G/4L; G/4L; G/4L; G/4L; G/4L
4 POWDER, FOR SOLUTION ORAL ONCE
Qty: 4000 ML | Refills: 0 | Status: SHIPPED | OUTPATIENT
Start: 2022-05-05 | End: 2022-05-05

## 2022-05-05 NOTE — PROGRESS NOTES
Clinic Note    Reason for visit:  The primary encounter diagnosis was Long term (current) use of antithrombotics/antiplatelets. Diagnoses of Gastroesophageal reflux disease, unspecified whether esophagitis present, History of colon polyps, and Personal history of peptic ulcer disease were also pertinent to this visit.    PCP: Sal Sewell       HPI:  This is a 57 y.o. male who is established with Dr. Delgado. Patient had coronary stent 3/2021 after MI and was on Effient managed by Dr. Hari Rios. Patient states PCP, Alexey Sewell took him off Effient about 4-5 months ago. Patient had colonoscopy 3/30/2021 and was found to have 7 polyps but due to recent stent these polyps were not removed at that time. Patient is due for repeat colonoscopy. Has h/o GERD and PUD and takes pantoprazole 20mg daily. Patient states pantoprazole was controlling reflux until recently it has not helped as much and he will have episodes of reflux and epigastric pain.       Last EGD 3/30/2021    Review of Systems   Constitutional: Negative for chills, diaphoresis, fatigue, fever and unexpected weight change.   HENT: Negative for hearing loss, mouth sores, nosebleeds, postnasal drip, sore throat, trouble swallowing and voice change.    Eyes: Negative for pain, discharge and eye dryness.   Respiratory: Negative for apnea, cough, choking, chest tightness, shortness of breath and wheezing.    Cardiovascular: Negative for chest pain, palpitations, leg swelling and claudication.   Gastrointestinal: Negative for abdominal distention, abdominal pain, anal bleeding, blood in stool, change in bowel habit, constipation, diarrhea, nausea, rectal pain, vomiting, reflux, fecal incontinence and change in bowel habit.   Genitourinary: Negative for bladder incontinence, difficulty urinating, dysuria, flank pain, frequency and hematuria.   Musculoskeletal: Negative for arthralgias, back pain, joint swelling and joint deformity.   Integumentary:   Negative for color change, rash and wound.   Allergic/Immunologic: Negative for environmental allergies and food allergies.   Neurological: Negative for seizures, facial asymmetry, speech difficulty, weakness, headaches and memory loss.   Hematological: Negative for adenopathy. Does not bruise/bleed easily.   Psychiatric/Behavioral: Negative for agitation, behavioral problems, confusion, hallucinations and sleep disturbance.      Past Medical History:   Diagnosis Date    Allergy     Anxiety     Bladder cancer     Bladder cancer 2021    Depression     GERD (gastroesophageal reflux disease)     Hypercholesterolemia     Paranoid schizophrenia      Past Surgical History:   Procedure Laterality Date    ANKLE SURGERY      BLADDER TUMOR EXCISION          CORONARY ANGIOPLASTY WITH STENT PLACEMENT      CYSTOSCOPY      FOOT FRACTURE SURGERY      HAND SURGERY Bilateral     WRIST SURGERY       Family History   Problem Relation Age of Onset    Coronary artery disease Mother     No Known Problems Father     No Known Problems Sister     Coronary artery disease Brother     Cancer Maternal Aunt     No Known Problems Maternal Uncle     Cancer Maternal Grandmother     Skin cancer Maternal Grandmother     No Known Problems Sister     No Known Problems Sister     No Known Problems Sister     No Known Problems Sister     Coronary artery disease Brother     No Known Problems Maternal Uncle     No Known Problems Maternal Uncle     No Known Problems Maternal Uncle     No Known Problems Maternal Uncle     No Known Problems Maternal Uncle     No Known Problems Maternal Uncle      Social History     Tobacco Use    Smoking status: Former Smoker     Packs/day: 0.50     Years: 44.00     Pack years: 22.00     Types: Cigarettes     Start date: 1977     Quit date: 2020     Years since quittin.3    Smokeless tobacco: Never Used   Substance Use Topics    Alcohol use: Not Currently    Drug  "use: Never     Review of patient's allergies indicates:   Allergen Reactions    Iodine and iodide containing products       Medication List with Changes/Refills   New Medications    POLYETHYLENE GLYCOL (GOLYTELY,NULYTELY) 236-22.74-6.74 -5.86 GRAM SUSPENSION    Take 4,000 mLs (4 L total) by mouth once. for 1 dose   Current Medications    AMLODIPINE (NORVASC) 10 MG TABLET    Take 1 tablet (10 mg total) by mouth once daily.    CIPROFLOXACIN HCL (CIPRO) 500 MG TABLET        HYDROXYZINE HCL (ATARAX) 25 MG TABLET        LISINOPRIL (PRINIVIL,ZESTRIL) 5 MG TABLET        METOPROLOL TARTRATE (LOPRESSOR) 25 MG TABLET        NITROFURANTOIN, MACROCRYSTAL-MONOHYDRATE, (MACROBID) 100 MG CAPSULE        ONDANSETRON (ZOFRAN) 8 MG TABLET        OXYBUTYNIN (DITROPAN-XL) 5 MG TR24        PANTOPRAZOLE (PROTONIX) 20 MG TABLET        PRASUGREL (EFFIENT) 10 MG TAB    Take 1 tablet by mouth once daily.    ROSUVASTATIN (CRESTOR) 20 MG TABLET        VENLAFAXINE (EFFEXOR-XR) 75 MG 24 HR CAPSULE       Discontinued Medications    HYDROCODONE-ACETAMINOPHEN (NORCO) 5-325 MG PER TABLET    Take 1 tablet by mouth every 6 (six) hours as needed for Pain.         Vital Signs:  BP (!) 137/91 (BP Location: Right arm, Patient Position: Sitting, BP Method: Medium (Automatic))   Pulse 82   Ht 5' 8" (1.727 m)   Wt 112.9 kg (249 lb)   SpO2 (!) 93%   BMI 37.86 kg/m²   Body mass index is 37.86 kg/m².      Physical Exam  Constitutional:       General: He is not in acute distress.     Appearance: Normal appearance. He is well-developed. He is obese. He is not ill-appearing or toxic-appearing.      Comments: No teeth.   HENT:      Head: Normocephalic and atraumatic.      Nose: Nose normal.      Mouth/Throat:      Mouth: Mucous membranes are moist.      Pharynx: Oropharynx is clear. No oropharyngeal exudate or posterior oropharyngeal erythema.   Eyes:      General: Lids are normal. No scleral icterus.        Right eye: No discharge.         Left eye: No " discharge.      Extraocular Movements: Extraocular movements intact.      Conjunctiva/sclera: Conjunctivae normal.   Cardiovascular:      Rate and Rhythm: Normal rate and regular rhythm.      Pulses:           Radial pulses are 2+ on the right side and 2+ on the left side.   Pulmonary:      Effort: Pulmonary effort is normal. No respiratory distress.      Breath sounds: No stridor. No wheezing or rhonchi.   Abdominal:      General: Bowel sounds are normal. There is no distension.      Palpations: Abdomen is soft. There is no fluid wave, hepatomegaly, splenomegaly or mass.      Tenderness: There is no abdominal tenderness. There is no guarding or rebound.   Musculoskeletal:      Cervical back: Full passive range of motion without pain.      Right lower le+ Pitting Edema present.      Left lower le+ Pitting Edema present.   Lymphadenopathy:      Cervical: No cervical adenopathy.   Skin:     General: Skin is warm and dry.      Capillary Refill: Capillary refill takes less than 2 seconds.      Coloration: Skin is not cyanotic, jaundiced or pale.      Findings: No rash.   Neurological:      General: No focal deficit present.      Mental Status: He is alert and oriented to person, place, and time.   Psychiatric:         Mood and Affect: Mood normal.         Behavior: Behavior is cooperative.            All of the data above and below has been reviewed by myself and any further interpretations will be reflected in the assessment and plan.   The data includes review of external notes, and independent interpretation of lab results, procedures, x-rays, and imaging reports.      Assessment:  Long term (current) use of antithrombotics/antiplatelets    Gastroesophageal reflux disease, unspecified whether esophagitis present    History of colon polyps  -     Ambulatory Referral to External Surgery  -     polyethylene glycol (GOLYTELY,NULYTELY) 236-22.74-6.74 -5.86 gram suspension; Take 4,000 mLs (4 L total) by mouth once.  for 1 dose  Dispense: 4000 mL; Refill: 0    Personal history of peptic ulcer disease      Did not have ulcer on EGD 3/30/2021 but given worsening reflux, will consider repeat upper endoscopy.      Recommendations:  Schedule colonoscopy with Dr. Delgado after receiving cardiac clearance. Will confirm with PCP that he is no longer on Effient before scheduling.      Risks, benefits, and alternatives of medical management, any associated procedures, and/or treatment discussed with the patient. Patient given opportunity to ask questions and voices understanding. Patient has elected to proceed with the recommended care modalities as discussed.    Follow up if symptoms worsen or fail to improve.    Order summary:  Orders Placed This Encounter   Procedures    Ambulatory Referral to External Surgery          Jeanette Hardin NP    This document may have been created using a voice recognition transcribing system. Incorrect words or phrases may have been missed during proofreading. Please interpret accordingly or contact me for clarification.

## 2022-05-05 NOTE — LETTER
May 5, 2022        Ankit Rios MD  [address]  [place] [address]             [facility]  [address] [address]  Phone: [phone]  Fax: [phone]   Patient: Chucho Mack   MR Number: [id number]   YOB: 1965   Date of Visit: 5/5/2022     Dear Dr. Rios,     I am writing to you for some assistance with a patient that is currently under your care. Chucho Mack  1965  has seen Jeanette Hardin NP and needs to be scheduled for a procedure with Dr. Glo Delgado. Ideally, the patient would hold the PRASUGREL for 3 days in the event that interventions are needed (dilation, polypectomy, biopsies, etc.). Since Jeanette Hardin NP is not managing this medication, she  would like your assistance in approving the above request. Please fax response to [phone]. Your response is greatly appreciated and will assist us in providing optimal patient care. Should you have any questions or concerns, please do not hesitate to contact me at [phone].            Sincerely,      Jeanette Hardin NP            CC  No Recipients    Enclosure

## 2022-05-05 NOTE — LETTER
May 5, 2022        Sal Sewell NP  [address] [address]             [facility]  [address] [address]  Phone: [phone]  Fax: [phone]   Patient: Chucho Mack   MR Number: [id number]   YOB: 1965   Date of Visit: 5/5/2022       Dear Dr. Sewell:    Thank you for referring Chucho Mack to me for evaluation. Attached you will find relevant portions of my assessment and plan of care.     If you have questions, please do not hesitate to call me. I look forward to following Chucho Mack along with you.    Sincerely,      Jeanette Hardin NP            CC  No Recipients    Enclosure

## 2022-05-05 NOTE — LETTER
May 9, 2022        Sal Sewell NP  [address] [address]             [facility]  [address] [address]  Phone: [phone]  Fax: [phone]   Patient: Chucho Mack   MR Number: [id number]   YOB: 1965   Date of Visit: 5/5/2022       Dear Dr. Sewell:    During recent office visit, the patient reported that you took him off Effient about 4-5 months ago. Before scheduling patient for a colonoscopy with Dr. Delgado I wanted to confirm that he is no longer taking Effient. Please let our office know as soon as possible so we may get the patient scheduled for his procedure. Our fax number is [phone].    If you have questions, please do not hesitate to call me. I look forward to following Chucho Mack along with you.    Sincerely,      Jeanette Hardin NP            CC  No Recipients    Enclosure

## 2022-05-05 NOTE — Clinical Note
Will need to confirm with PCP that he is no longer taking Effient. If on Effient, will get clearance from Dr. Rios to hold 3 days prior to procedure.

## 2022-05-05 NOTE — TELEPHONE ENCOUNTER
Patient did not bring medications with him. Will need to clarify medications before scheduling procedure.   MLC

## 2022-05-17 ENCOUNTER — TELEPHONE (OUTPATIENT)
Dept: GASTROENTEROLOGY | Facility: CLINIC | Age: 57
End: 2022-05-17
Payer: MEDICAID

## 2022-05-23 ENCOUNTER — TELEPHONE (OUTPATIENT)
Dept: UROLOGY | Facility: CLINIC | Age: 57
End: 2022-05-23
Payer: MEDICAID

## 2022-05-23 NOTE — TELEPHONE ENCOUNTER
Contacted pt, pt states he's have pain in his foot. Advised pt that he has to contact his PCP or foot doctor. Pt verbalized understanding. BJP    ----- Message from Jovanna Ramsey sent at 5/23/2022  2:36 PM CDT -----  Pt rs'd cysto to august he will be out of town until then. He would like a call back from nurse, phone was cutting out and I could not hear reason

## 2022-05-25 ENCOUNTER — TELEPHONE (OUTPATIENT)
Dept: GASTROENTEROLOGY | Facility: CLINIC | Age: 57
End: 2022-05-25
Payer: MEDICAID

## 2022-05-25 NOTE — TELEPHONE ENCOUNTER
----- Message from Myra Kuhn MA sent at 5/23/2022  2:30 PM CDT -----  Contact: Patient    ----- Message -----  From: Marimar Nance  Sent: 5/23/2022   2:30 PM CDT  To: Danny KIRBY Staff    Patient need the nurse to call him to schedule his procedure      Call back #  [phone]

## 2022-08-03 ENCOUNTER — TELEPHONE (OUTPATIENT)
Dept: UROLOGY | Facility: CLINIC | Age: 57
End: 2022-08-03
Payer: MEDICAID

## 2022-08-08 ENCOUNTER — TELEPHONE (OUTPATIENT)
Dept: GASTROENTEROLOGY | Facility: CLINIC | Age: 57
End: 2022-08-08
Payer: MEDICAID

## 2022-08-08 DIAGNOSIS — Z86.010 HISTORY OF COLON POLYPS: Primary | ICD-10-CM

## 2022-08-08 NOTE — TELEPHONE ENCOUNTER
"[facility]  [address] [place] [address]  Phone: [phone]  Fax: [phone]    History & Physical         Provider: Dr. Glo Delgado    Patient Name: Chucho MOE (age):1965  57 y.o.           Gender: male   Phone: [phone]     Referring Physician: Sal Sewell     Vital Signs:   Height - 5'8"  Weight - 249lbs  BMI -  37.86    Plan:        Colonoscopy @ [facility] Start IV - O2 NC         Encounter Diagnosis   Name Primary?    History of colon polyps Yes           History:      Past Medical History:   Diagnosis Date    Allergy     Anxiety     Bladder cancer     Bladder cancer 2021    Depression     GERD (gastroesophageal reflux disease)     Hypercholesterolemia     Paranoid schizophrenia       Past Surgical History:   Procedure Laterality Date    ANKLE SURGERY      BLADDER TUMOR EXCISION          CORONARY ANGIOPLASTY WITH STENT PLACEMENT      CYSTOSCOPY      FOOT FRACTURE SURGERY      HAND SURGERY Bilateral     WRIST SURGERY        Medication List with Changes/Refills   Current Medications    AMLODIPINE (NORVASC) 10 MG TABLET    Take 1 tablet (10 mg total) by mouth once daily.    CIPROFLOXACIN HCL (CIPRO) 500 MG TABLET        HYDROXYZINE HCL (ATARAX) 25 MG TABLET        LISINOPRIL (PRINIVIL,ZESTRIL) 5 MG TABLET        METOPROLOL TARTRATE (LOPRESSOR) 25 MG TABLET        NITROFURANTOIN, MACROCRYSTAL-MONOHYDRATE, (MACROBID) 100 MG CAPSULE        ONDANSETRON (ZOFRAN) 8 MG TABLET        OXYBUTYNIN (DITROPAN-XL) 5 MG TR24        PANTOPRAZOLE (PROTONIX) 20 MG TABLET        PRASUGREL (EFFIENT) 10 MG TAB    Take 1 tablet by mouth once daily.    ROSUVASTATIN (CRESTOR) 20 MG TABLET        VENLAFAXINE (EFFEXOR-XR) 75 MG 24 HR CAPSULE          Review of patient's allergies indicates:   Allergen Reactions    Iodine and iodide containing products       Family History "   Problem Relation Age of Onset    Coronary artery disease Mother     No Known Problems Father     No Known Problems Sister     Coronary artery disease Brother     Cancer Maternal Aunt     No Known Problems Maternal Uncle     Cancer Maternal Grandmother     Skin cancer Maternal Grandmother     No Known Problems Sister     No Known Problems Sister     No Known Problems Sister     No Known Problems Sister     Coronary artery disease Brother     No Known Problems Maternal Uncle     No Known Problems Maternal Uncle     No Known Problems Maternal Uncle     No Known Problems Maternal Uncle     No Known Problems Maternal Uncle     No Known Problems Maternal Uncle       Social History     Tobacco Use    Smoking status: Former Smoker     Packs/day: 0.50     Years: 44.00     Pack years: 22.00     Types: Cigarettes     Start date: 1977     Quit date: 2020     Years since quittin.6    Smokeless tobacco: Never Used   Substance Use Topics    Alcohol use: Not Currently    Drug use: Never        Physical Examination:     General Appearance:___________________________  HEENT: _____________________________________  Abdomen:____________________________________  Heart:________________________________________  Lungs:_______________________________________  Extremities:___________________________________  Skin:_________________________________________  Endocrine:____________________________________  Genitourinary:_________________________________  Neurological:__________________________________      Patient has been evaluated immediately prior to sedation and is medically cleared for endoscopy with IVCS as an ASA class: ______      Physician Signature: _________________________       Date: ________  Time: ________

## 2022-08-17 ENCOUNTER — TELEPHONE (OUTPATIENT)
Dept: UROLOGY | Facility: CLINIC | Age: 57
End: 2022-08-17
Payer: MEDICAID

## 2022-08-17 NOTE — TELEPHONE ENCOUNTER
Patient contacted and rescheduled cystoscopy. Educated/advised on importance of following up on bladder cancer and hematuria and possible progression of cancer cells. Verbalized understanding.

## 2022-10-04 ENCOUNTER — TELEPHONE (OUTPATIENT)
Dept: UROLOGY | Facility: CLINIC | Age: 57
End: 2022-10-04
Payer: MEDICAID

## 2022-10-04 NOTE — TELEPHONE ENCOUNTER
"Pt stated he was having "prostate issues" after further talking with him he is having ED. Informed since its been a while and not something that has been addressed at previous appts we will discuss at his cysto. He agreed and verbalized understanding. HMLpn  "

## 2022-10-04 NOTE — TELEPHONE ENCOUNTER
----- Message from Jovanna Ramsey sent at 10/4/2022  3:10 PM CDT -----  Pt has missed several cysto appt's and I finally talked to him today. The cysto is on 11/15/22. He would like a nurse to call him back he has ?'s about prostate problems

## 2022-11-14 ENCOUNTER — TELEPHONE (OUTPATIENT)
Dept: UROLOGY | Facility: CLINIC | Age: 57
End: 2022-11-14
Payer: MEDICAID

## 2022-11-15 ENCOUNTER — PROCEDURE VISIT (OUTPATIENT)
Dept: UROLOGY | Facility: CLINIC | Age: 57
End: 2022-11-15
Payer: MEDICAID

## 2022-11-15 VITALS
DIASTOLIC BLOOD PRESSURE: 98 MMHG | BODY MASS INDEX: 35.81 KG/M2 | RESPIRATION RATE: 18 BRPM | SYSTOLIC BLOOD PRESSURE: 171 MMHG | WEIGHT: 235.5 LBS | OXYGEN SATURATION: 99 % | HEART RATE: 95 BPM

## 2022-11-15 DIAGNOSIS — N52.9 ERECTILE DYSFUNCTION, UNSPECIFIED ERECTILE DYSFUNCTION TYPE: ICD-10-CM

## 2022-11-15 DIAGNOSIS — C67.9 MALIGNANT NEOPLASM OF URINARY BLADDER, UNSPECIFIED SITE: Primary | ICD-10-CM

## 2022-11-15 PROCEDURE — 52000 CYSTOURETHROSCOPY: CPT | Mod: S$GLB,,, | Performed by: UROLOGY

## 2022-11-15 PROCEDURE — 52000 CYSTOSCOPY: ICD-10-PCS | Mod: S$GLB,,, | Performed by: UROLOGY

## 2022-11-15 RX ORDER — SILDENAFIL 100 MG/1
100 TABLET, FILM COATED ORAL DAILY PRN
Qty: 30 TABLET | Refills: 11 | Status: SHIPPED | OUTPATIENT
Start: 2022-11-15 | End: 2023-11-15

## 2022-11-15 NOTE — PROCEDURES
Cystoscopy    Date/Time: 11/15/2022 10:30 AM  Performed by: Mik Bro MD  Authorized by: Braulio Beasley NP     Consent Done?:  Yes (Written)  Timeout: prior to procedure the correct patient, procedure, and site was verified    Prep: patient was prepped and draped in usual sterile fashion    Anesthesia:  Intraurethral instillation  Indications: hematuria    Position:  Supine  Anesthesia:  Intraurethral instillation  Patient sedated?: No    Preparation: Patient was prepped and draped in usual sterile fashion    Scope type:  Flexible cystoscope  External exam normal: Yes    Urethra normal: Yes    Prostate normal: Yes    Comments:      Patient was brought to the procedure room placed on the table padded prepped and draped in usual sterile fashion in supine position. The cystoscope was inserted into the urethra and advanced the urethra was normal prostate showed expected enlargement for patient's age, the bladder is entered and inspected at the dome of the bladder there was noted to be a large sessile bladder mass encrusted with stone consistent with his known T2 disease after chemo radiation  Bilateral ureteral orifices were identified and noted to be normal in appearance with clear efflux of urine at this point the scope was removed the patient tolerated the procedure well there were no complications    Impression:  There is a large stone encrusted mass at the dome of the bladder we will get samples of this determine if this is actively malignant and clear the stone.  The patient is having significant discomfort related to the this lesion

## 2022-11-15 NOTE — PATIENT INSTRUCTIONS
Patient Education       Cystoscopy Discharge Instructions   About this topic   Your kidneys make urine. It is stored in your bladder. The urethra is a tube at the bottom of the bladder. Urine flows out of this tube. Sometimes, there is a blockage and urine is not able to leave the body.  A cystoscopy is a procedure that lets the doctor see the inside of your bladder and urethra. The doctor does it to:  Look for stones or tumors blocking the bladder and urethra  Look for changes or injury inside the bladder  Take a tissue sample from the inside of your bladder  Look for reasons for blood in the urine, pain with urination, or why you are passing urine often  Look for prostate problems     What care is needed at home?   Ask your doctor what you need to do when you go home. Make sure you ask questions if you do not understand what the doctor says. This way you will know what you need to do.  Take a warm bath or use a warm wet washcloth over the opening to the urethra. This will help to ease any pain. Do this as needed.  Drink 6 to 8 glasses of water a day and 3 to 4 glasses in the first few hours after the procedure to flush out your bladder and reduce irritation.  You may see some blood in your urine for a few days. This is normal.  Empty your bladder as soon as you feel the need to. Don't delay going to the bathroom. It stretches and weakens the bladder.  What follow-up care is needed?   Your doctor may ask you to make visits to the office to check on your progress. Be sure to keep these visits.  If you had a biopsy, talk with your doctor about the results.  What drugs may be needed?   The doctor may order drugs to:  Help with pain  Fight an infection  Help with bladder spasms  Will physical activity be limited?   Talk to your doctor about when you may go back to your normal activities like work, driving, or sex.  What problems could happen?   Bleeding  Infection  Injury to the bladder and urethra  Discomfort in the  urethra area  Burning sensation for a short time  Upset stomach  When do I need to call the doctor?   Signs of infection. These include a fever of 100.4°F (38°C) or higher, chills, pain with passing urine.  Pain that does not go away even with drugs or that lasts longer than 2 days  Too much blood in your urine  Passing large dime-sized clots  Cloudy urine  Little or no urine or not able to pass urine  Abdominal pain and nausea  Teach Back: Helping You Understand   The Teach Back Method helps you understand the information we are giving you. After you talk with the staff, tell them in your own words what you learned. This helps to make sure the staff has described each thing clearly. It also helps to explain things that may have been confusing. Before going home, make sure you can do these:  I can tell you about my procedure.  I can tell you what may help ease my pain.  I can tell you what I will do if I have a fever, chills, or am not able to pass urine.  Where can I learn more?   American Cancer Society  https://www.cancer.org/treatment/understanding-your-diagnosis/tests/endoscopy/cystoscopy.html   Cancer Research UK  https://www.cancerresearchuk.org/about-cancer/bladder-cancer/getting-diagnosed/tests-diagnose/cystoscopy   NHS Choices  http://www.nhs.uk/conditions/Cystoscopy/Pages/Introduction.aspx   Last Reviewed Date   2021-04-22  Consumer Information Use and Disclaimer   This information is not specific medical advice and does not replace information you receive from your health care provider. This is only a brief summary of general information. It does NOT include all information about conditions, illnesses, injuries, tests, procedures, treatments, therapies, discharge instructions or life-style choices that may apply to you. You must talk with your health care provider for complete information about your health and treatment options. This information should not be used to decide whether or not to accept your  health care providers advice, instructions or recommendations. Only your health care provider has the knowledge and training to provide advice that is right for you.  Copyright   Copyright © 2021 [employer] and its affiliates and/or licensors. All rights reserved.

## 2022-12-16 ENCOUNTER — TELEPHONE (OUTPATIENT)
Dept: UROLOGY | Facility: CLINIC | Age: 57
End: 2022-12-16
Payer: MEDICAID

## 2022-12-16 DIAGNOSIS — C67.9 MALIGNANT NEOPLASM OF URINARY BLADDER, UNSPECIFIED SITE: Primary | ICD-10-CM

## 2022-12-16 NOTE — TELEPHONE ENCOUNTER
Pt called stating he saw his medical and cardiac doctor and was cleared. I have rescheduled pt and placed order for 12/28 on Jacinta desk. KRIS Araiza

## 2022-12-21 ENCOUNTER — TELEPHONE (OUTPATIENT)
Dept: UROLOGY | Facility: CLINIC | Age: 57
End: 2022-12-21
Payer: MEDICAID

## 2022-12-21 NOTE — TELEPHONE ENCOUNTER
Spoke with Jacinta in Scheduling.  She has received all his clearances and has attempted to call him with no success.  She will attempt again. Also he states that he needs a new referral to Dr. Melara office in order to get his pain medicine for his radiation.   ----- Message from Flory Crawford sent at 12/21/2022  9:49 AM CST -----  Contact: Patient  Patient called to consult with nurse or staff regarding permission documents. He states he received a call on yesterday and was returning that call. He would like a call back and can be reached at [phone]. Thanks/MR

## 2022-12-21 NOTE — TELEPHONE ENCOUNTER
----- Message from Flory Crawford sent at 12/21/2022  9:49 AM CST -----  Contact: Patient  Patient called to consult with nurse or staff regarding permission documents. He states he received a call on yesterday and was returning that call. He would like a call back and can be reached at [phone]. Thanks/

## 2022-12-22 NOTE — TELEPHONE ENCOUNTER
Contacted nurse Lauren navigator- appt scheduled for patient on 01/09/2022 at 1320.    Patient contacted and advised of scheduled  appt day/time with Dr. Brock. Patient verbalized understanding.

## 2022-12-28 ENCOUNTER — TELEPHONE (OUTPATIENT)
Dept: UROLOGY | Facility: CLINIC | Age: 57
End: 2022-12-28
Payer: MEDICAID

## 2022-12-28 ENCOUNTER — OUTSIDE PLACE OF SERVICE (OUTPATIENT)
Dept: UROLOGY | Facility: CLINIC | Age: 57
End: 2022-12-28
Payer: MEDICAID

## 2022-12-28 DIAGNOSIS — C67.9 MALIGNANT NEOPLASM OF URINARY BLADDER, UNSPECIFIED SITE: Primary | ICD-10-CM

## 2022-12-28 LAB
ANION GAP SERPL CALC-SCNC: 13 MMOL/L (ref 3–11)
APPEARANCE, UA: CLEAR
BASOPHILS NFR BLD: 0.3 % (ref 0–3)
BILIRUB UR QL STRIP: NEGATIVE MG/DL
BUN SERPL-MCNC: 14 MG/DL (ref 7–18)
BUN/CREAT SERPL: 11.76 RATIO (ref 7–18)
CALCIUM SERPL-MCNC: 9.7 MG/DL (ref 8.8–10.5)
CHLORIDE SERPL-SCNC: 103 MMOL/L (ref 100–108)
CO2 SERPL-SCNC: 22 MMOL/L (ref 21–32)
COLOR UR: ABNORMAL
CREAT SERPL-MCNC: 1.19 MG/DL (ref 0.7–1.3)
EOSINOPHIL NFR BLD: 2.2 % (ref 1–3)
ERYTHROCYTE [DISTWIDTH] IN BLOOD BY AUTOMATED COUNT: 13.7 % (ref 12.5–18)
GFR ESTIMATION: > 60
GLUCOSE (UA): NORMAL MG/DL
GLUCOSE SERPL-MCNC: 101 MG/DL (ref 70–105)
GLUCOSE SERPL-MCNC: 101 MG/DL (ref 70–105)
GLUCOSE SERPL-MCNC: 111 MG/DL (ref 70–110)
HCT VFR BLD AUTO: 50 % (ref 42–52)
HGB BLD-MCNC: 16.7 G/DL (ref 14–18)
HGB UR QL STRIP: 250 /UL
KETONES UR QL STRIP: ABNORMAL MG/DL
LEUKOCYTE ESTERASE UR QL STRIP: 500 /UL
LYMPHOCYTES NFR BLD: 18.2 % (ref 25–40)
MCH RBC QN AUTO: 31.9 PG (ref 27–31.2)
MCHC RBC AUTO-ENTMCNC: 33.4 G/DL (ref 31.8–35.4)
MCV RBC AUTO: 95.6 FL (ref 80–97)
MONOCYTES NFR BLD: 8.7 % (ref 1–15)
NEUTROPHILS # BLD AUTO: 5.53 10*3/UL (ref 1.8–7.7)
NEUTROPHILS NFR BLD: 70.3 % (ref 37–80)
NITRITE UR QL STRIP: NEGATIVE
NUCLEATED RED BLOOD CELLS: 0 %
PH UR STRIP: 6 PH (ref 5–9)
PLATELETS: 250 10*3/UL (ref 142–424)
POTASSIUM SERPL-SCNC: 4.5 MMOL/L (ref 3.6–5.2)
PROT UR QL STRIP: 100 MG/DL
RBC # BLD AUTO: 5.23 10*6/UL (ref 4.7–6.1)
SODIUM BLD-SCNC: 138 MMOL/L (ref 135–145)
SP GR UR STRIP: 1.02 (ref 1–1.03)
SPECIMEN COLLECTION METHOD, URINE: ABNORMAL
UROBILINOGEN UR STRIP-ACNC: NORMAL MG/DL
WBC # BLD: 7.9 10*3/UL (ref 4.6–10.2)

## 2022-12-28 PROCEDURE — 52235 CYSTOSCOPY AND TREATMENT: CPT | Mod: ,,, | Performed by: UROLOGY

## 2022-12-28 PROCEDURE — 52235 PR CYSTOURETHROSCOPY,FULGUR 2-5 CM LESN: ICD-10-PCS | Mod: ,,, | Performed by: UROLOGY

## 2022-12-28 RX ORDER — CIPROFLOXACIN 500 MG/1
500 TABLET ORAL 2 TIMES DAILY
Qty: 6 TABLET | Refills: 0 | Status: SHIPPED | OUTPATIENT
Start: 2022-12-28 | End: 2022-12-28

## 2022-12-28 RX ORDER — HYDROCODONE BITARTRATE AND ACETAMINOPHEN 10; 325 MG/1; MG/1
1 TABLET ORAL EVERY 6 HOURS PRN
Qty: 15 TABLET | Refills: 0 | Status: SHIPPED | OUTPATIENT
Start: 2022-12-28 | End: 2023-01-27

## 2022-12-28 RX ORDER — CIPROFLOXACIN 500 MG/1
500 TABLET ORAL 2 TIMES DAILY
Qty: 6 TABLET | Refills: 0 | Status: SHIPPED | OUTPATIENT
Start: 2022-12-28 | End: 2022-12-31

## 2022-12-28 NOTE — TELEPHONE ENCOUNTER
Attempted to return pt call, left VM.    ----- Message from Meagan Negrete sent at 12/28/2022  1:46 PM CST -----  Contact: self  Type:  RX Refill Request    Who Called: Chucho Mack    Refill or New Rx:new  RX Name and Strength:valium  How is the patient currently taking it? (ex. 1XDay):2 x a day  Is this a 30 day or 90 day RX:30  Preferred Pharmacy with phone number:  Dinorah Gomez [facility] [address] [place]  [place], [place] - [address] [id number] [address] [address]  Phone: [phone] Fax: [phone]      Local or Mail Order:local  Ordering Provider:tamy  Would the patient rather a call back or a response via [facility]? N/a  Best Call Back Number:n/a  Additional Information: n/a

## 2022-12-29 ENCOUNTER — TELEPHONE (OUTPATIENT)
Dept: UROLOGY | Facility: CLINIC | Age: 57
End: 2022-12-29
Payer: MEDICAID

## 2022-12-29 NOTE — TELEPHONE ENCOUNTER
Pt called asking for us to fill a valium script, I informed pt we can not prescribe this. Pt states he is burning when urinating. I offered him to come drop a urine off today and he stated he cannot come until next week. I scheduled pt for ua drop off on Tuesday.     ----- Message from Maru Grady sent at 12/29/2022  1:49 PM CST -----  Contact: self  Type:  RX Refill Request    Who Called: Chucho Mack   Refill or New Rx:new   RX Name and Strength:Valve 5mg / 10/10  How is the patient currently taking it? (ex. 1XDay):not sure   Is this a 30 day or 90 day RX:not sure   Preferred Pharmacy with phone number:  Dinorah Gomez [facility] [address] - [place] - [place], [place] - [address]  [address]  [place] [address]  Phone: [phone] Fax: [phone]     Local or Mail Order:local  Ordering Provider:Dieudonne   Would the patient rather a call back or a response via [facility]? Call   Best Call Back Number:[phone]   Additional Information: pt says it wasn't sent in / pt asks for a call back

## 2022-12-30 LAB — SPECIMEN TO PATHOLOGY: NORMAL

## 2023-01-18 ENCOUNTER — TELEPHONE (OUTPATIENT)
Dept: GASTROENTEROLOGY | Facility: CLINIC | Age: 58
End: 2023-01-18
Payer: MEDICAID

## 2023-01-18 ENCOUNTER — TELEPHONE (OUTPATIENT)
Dept: UROLOGY | Facility: CLINIC | Age: 58
End: 2023-01-18
Payer: MEDICAID

## 2023-01-18 DIAGNOSIS — Z86.010 PERSONAL HISTORY OF COLONIC POLYPS: Primary | ICD-10-CM

## 2023-01-18 DIAGNOSIS — K63.5 POLYP OF COLON, UNSPECIFIED PART OF COLON, UNSPECIFIED TYPE: ICD-10-CM

## 2023-01-18 NOTE — TELEPHONE ENCOUNTER
----- Message from Akua Sousa LPN sent at 1/18/2023  1:41 PM CST -----  Contact: self    ----- Message -----  From: Maddie Aguilera  Sent: 1/18/2023  10:44 AM CST  To: Danny KIRBY Staff    Type - Follow Up on Future Procedure Scheduling     Patient called in today explaining he needed to schedule something with Dr Delgado, please review and reach out to patient @ [phone] - thank you!

## 2023-01-18 NOTE — TELEPHONE ENCOUNTER
Pt rescheduled.     ----- Message from Maddie Aguilera sent at 1/18/2023 10:41 AM CST -----  Contact: self  Type:  Test Results    Who Called: Chucho Mack   Name of Test (Lab/Mammo/Etc): Biopsy   Date of Test: 12/28/22  Ordering Provider:  Dr Bro   Where the test was performed: in clinic   Would the patient rather a call back or a response via [facility]? Call back   Best Call Back Number: [phone]   Additional Information:

## 2023-01-19 NOTE — TELEPHONE ENCOUNTER
"This patient has scheduled for his GI procedure multiple times and will "no show" without answering phone or providing notice. It has been difficult to connect with him as well. The only time he presented for his procedure, he had a coronary stent placed earlier that month and I was not notified prior to his GI procedure presentation.  He has missed multiple and radiation therapies as well.    [facility] that the patient is not on a blood thinner (that the patient was taken off blood thinners and blockers).  He needs to be set up for a colonoscopy with aggressive prep at [facility]. He needs to provide a list of all of his medicines (or bring them to the office for one of our staff to review) to confirm we have them all correct.  If he cannot keep this next scheduled GI procedure, then I will not be able to continue his GI care as it is unsafe to do so and has made his procedure high risk for bleeding and perforation as well as sedation (increased coughing throughout last procedure).  NBP  "

## 2023-01-24 VITALS — WEIGHT: 235 LBS | HEIGHT: 68 IN | BODY MASS INDEX: 35.61 KG/M2

## 2023-01-24 NOTE — TELEPHONE ENCOUNTER
Called pt to schedule colonoscopy with aggressive prep at [facility].  As I was updating the medial chart, pt was unable to provide a list of his medications. He stated he did not have glasses to read them off. I explained that I was unable to schedule him at this time. That his medication and medical history are a vital part of scheduling.     I told him he could either provide a written list or I could call him back to obtain his medications. Pt requested that I call him back later. I also told him that if he finds his glasses or can provide me his meds to call me as well. Pt agreed.       Please read NBP note before scheduling. syl

## 2023-01-27 RX ORDER — CLONAZEPAM 1 MG/1
1 TABLET ORAL 2 TIMES DAILY
COMMUNITY
Start: 2023-01-05

## 2023-01-27 RX ORDER — VENLAFAXINE HYDROCHLORIDE 150 MG/1
CAPSULE, EXTENDED RELEASE ORAL
COMMUNITY
Start: 2022-11-10

## 2023-01-27 RX ORDER — DULAGLUTIDE 0.75 MG/.5ML
INJECTION, SOLUTION SUBCUTANEOUS
COMMUNITY
Start: 2023-01-05

## 2023-01-27 RX ORDER — LISINOPRIL 10 MG/1
TABLET ORAL
COMMUNITY
Start: 2022-11-10

## 2023-01-27 RX ORDER — METFORMIN HYDROCHLORIDE 500 MG/1
TABLET ORAL
COMMUNITY
Start: 2022-11-10

## 2023-01-27 RX ORDER — PANTOPRAZOLE SODIUM 40 MG/1
TABLET, DELAYED RELEASE ORAL
COMMUNITY
Start: 2022-11-10

## 2023-01-27 RX ORDER — EZETIMIBE 10 MG/1
TABLET ORAL
COMMUNITY
Start: 2022-12-14

## 2023-01-27 NOTE — TELEPHONE ENCOUNTER
Called pt to go over medication and medical history. Pt stated that he just got out of the hospital for UT infection. Went over the prep instruction and pt requested that I email him to him at [email or web address].    I also explained that NBP would like for him to start an aggressive prep the week prior to his procedure. To take a bottle of MiraLax 1 capful everyday. Then start his prep laxative as directed on his prep paperwork. Pt acknowledge he understood. syl

## 2023-01-29 RX ORDER — SODIUM, POTASSIUM,MAG SULFATES 17.5-3.13G
1 SOLUTION, RECONSTITUTED, ORAL ORAL ONCE
Qty: 1 KIT | Refills: 0 | Status: CANCELLED | OUTPATIENT
Start: 2023-01-29 | End: 2023-01-29

## 2023-01-30 NOTE — TELEPHONE ENCOUNTER
Is the patient taking the Effient? If so, then we need clearance from his cardiologist. This was the same misunderstanding that we had previously.    NBP

## 2023-02-21 ENCOUNTER — TELEPHONE (OUTPATIENT)
Dept: UROLOGY | Facility: CLINIC | Age: 58
End: 2023-02-21
Payer: MEDICAID

## 2023-02-21 NOTE — TELEPHONE ENCOUNTER
----- Message from Meagan Negrete sent at 2/21/2023 10:06 AM CST -----  Contact: self  Type:  Same Day Appointment Request    Caller is requesting a same day appointment.  Caller declined first available appointment listed below.    Name of Caller:Chucho Mack    When is the first available appointment?03/2023  Symptoms:urinary blockage  Best Call Back Number:[phone]    Additional Information: pt states his urine was clogged today and then is spurted out something with a bubblegum texture pt is concerned

## 2023-02-21 NOTE — TELEPHONE ENCOUNTER
"Pt stated when he went to bathroom something "stopped his urine stream for about 7-8 seconds" and that "a yellow in color and soft and firm like bubblegum" piece came out and his flow was restored. Pt stated he has had several UTIs over the last few months. Advised pt that he can come in today for UA drop off to check for infection and that this could be a start of one or possibly some skin/sluff from inside the bladder. Pt stated he cant come in today bc he has to set up transportation. Appt made for thurs. Wilman  "

## 2023-02-22 NOTE — TELEPHONE ENCOUNTER
Called pt back to confirm that he is taking Effient. Pt stated yes, he is. I explained that I was sending a cardiac clearance letter to Dr. Rios regarding holding Effient. Pt acknowledge that he understood.     Faxed cardiac clearance to Dr. Rios [phone]. [facility]

## 2023-02-28 NOTE — TELEPHONE ENCOUNTER
Rec'd my cardiac clearance letter back stating that the pt has an appt with Dr. Rios on 5/16/23 @ 9:30.  I will follow up after appointment to see if cardiac clearance has been sent back. syl

## 2023-03-29 ENCOUNTER — TELEPHONE (OUTPATIENT)
Dept: UROLOGY | Facility: CLINIC | Age: 58
End: 2023-03-29
Payer: MEDICAID

## 2023-03-29 DIAGNOSIS — C67.9 MALIGNANT NEOPLASM OF URINARY BLADDER, UNSPECIFIED SITE: Primary | ICD-10-CM

## 2023-03-29 NOTE — TELEPHONE ENCOUNTER
----- Message from Nicole Espino sent at 3/29/2023  1:41 PM CDT -----  Please call pt @ [phone] regarding referral, pt states he need a new referral for Dr Concepcion, pt having constipation and abdominal pain, pt states he out of pain meds

## 2023-03-29 NOTE — TELEPHONE ENCOUNTER
"Pt stated he has been having issues with transportation since he moved to [place]. He stated he had a "silly puddy" type come from bladder. He thinks this was the mitomycin we injected during last TURBT. He stated it "clogged me up for about 15 min, then passed, and since then cira been peeing blood/blood clots". He stated it is super painful to urinate as well as burning. He did state he has had 2 infections since last being seen. Ordered cysto for eval and we will also collect UA at that appt to check for infection. A message was sent to Dr. Nelson office to call him as well for a f/u at the patient request. Pt informed of plan and was advised to wait for call to schedule procedure. Pt verbalized understanding. HMLpn  "

## 2023-04-05 ENCOUNTER — TELEPHONE (OUTPATIENT)
Dept: UROLOGY | Facility: CLINIC | Age: 58
End: 2023-04-05
Payer: MEDICAID

## 2023-04-05 NOTE — TELEPHONE ENCOUNTER
Spoke with pt and informed he is to be here at 11:10)    ----- Message from Callie Gonsalves sent at 4/5/2023 10:46 AM CDT -----  Contact: self  Type:  Patient Returning Call    Who Called: Chucho Mack   Who Left Message for Patient: Kassy Quinteros RN   Does the patient know what this is regarding?:His procedure for tomorrow  Would the patient rather a call back or a response via [facility]? Call back   Best Call Back Number:[phone]   Additional Information: N/a

## 2023-04-06 ENCOUNTER — PROCEDURE VISIT (OUTPATIENT)
Dept: UROLOGY | Facility: CLINIC | Age: 58
End: 2023-04-06
Payer: MEDICAID

## 2023-04-06 VITALS
HEART RATE: 84 BPM | RESPIRATION RATE: 20 BRPM | DIASTOLIC BLOOD PRESSURE: 83 MMHG | HEIGHT: 68 IN | WEIGHT: 200.19 LBS | SYSTOLIC BLOOD PRESSURE: 137 MMHG | OXYGEN SATURATION: 96 % | BODY MASS INDEX: 30.34 KG/M2

## 2023-04-06 DIAGNOSIS — C67.9 MALIGNANT NEOPLASM OF URINARY BLADDER, UNSPECIFIED SITE: ICD-10-CM

## 2023-04-06 DIAGNOSIS — R82.90 ABNORMAL URINALYSIS: Primary | ICD-10-CM

## 2023-04-06 PROCEDURE — 52000 CYSTOSCOPY: ICD-10-PCS | Mod: S$GLB,,, | Performed by: UROLOGY

## 2023-04-06 PROCEDURE — 52000 CYSTOURETHROSCOPY: CPT | Mod: S$GLB,,, | Performed by: UROLOGY

## 2023-04-06 RX ORDER — PHENAZOPYRIDINE HYDROCHLORIDE 200 MG/1
200 TABLET, FILM COATED ORAL 3 TIMES DAILY PRN
Qty: 30 TABLET | Refills: 0 | Status: SHIPPED | OUTPATIENT
Start: 2023-04-06 | End: 2023-04-16

## 2023-04-06 RX ORDER — CEFDINIR 300 MG/1
300 CAPSULE ORAL 2 TIMES DAILY
Qty: 20 CAPSULE | Refills: 0 | Status: SHIPPED | OUTPATIENT
Start: 2023-04-06 | End: 2023-04-16

## 2023-04-06 NOTE — PROCEDURES
Cystoscopy    Date/Time: 4/6/2023 11:30 AM  Performed by: Mik Bro MD  Authorized by: Mik Bro MD     Consent Done?:  Yes (Written)  Timeout: prior to procedure the correct patient, procedure, and site was verified    Prep: patient was prepped and draped in usual sterile fashion    Anesthesia:  Intraurethral instillation  Indications: hematuria    Position:  Supine  Anesthesia:  Intraurethral instillation  Patient sedated?: No    Preparation: Patient was prepped and draped in usual sterile fashion    Scope type:  Flexible cystoscope  External exam normal: Yes    Urethra normal: Yes    Prostate normal: Yes    Comments:      Patient was brought to the procedure room placed on the table padded prepped and draped in usual sterile fashion in supine position. The cystoscope was inserted into the urethra and advanced the urethra was normal prostate showed expected enlargement for patient's age, the bladder is entered and inspected noted to be multiple areas of oozing and bladder tumor recurrence in multiple places in the bladder.  Bilateral ureteral orifices were identified and noted to be normal in appearance with clear efflux of urine at this point the scope was removed the patient tolerated the procedure well there were no complications    Impression:  Recurrent bladder tumor we will proceed with TURBT next available date

## 2023-04-06 NOTE — PATIENT INSTRUCTIONS
Patient Education       Cystoscopy Discharge Instructions   About this topic   Your kidneys make urine. It is stored in your bladder. The urethra is a tube at the bottom of the bladder. Urine flows out of this tube. Sometimes, there is a blockage and urine is not able to leave the body.  A cystoscopy is a procedure that lets the doctor see the inside of your bladder and urethra. The doctor does it to:  Look for stones or tumors blocking the bladder and urethra  Look for changes or injury inside the bladder  Take a tissue sample from the inside of your bladder  Look for reasons for blood in the urine, pain with urination, or why you are passing urine often  Look for prostate problems     What care is needed at home?   Ask your doctor what you need to do when you go home. Make sure you ask questions if you do not understand what the doctor says. This way you will know what you need to do.  Take a warm bath or use a warm wet washcloth over the opening to the urethra. This will help to ease any pain. Do this as needed.  Drink 6 to 8 glasses of water a day and 3 to 4 glasses in the first few hours after the procedure to flush out your bladder and reduce irritation.  You may see some blood in your urine for a few days. This is normal.  Empty your bladder as soon as you feel the need to. Don't delay going to the bathroom. It stretches and weakens the bladder.  What follow-up care is needed?   Your doctor may ask you to make visits to the office to check on your progress. Be sure to keep these visits.  If you had a biopsy, talk with your doctor about the results.  What drugs may be needed?   The doctor may order drugs to:  Help with pain  Fight an infection  Help with bladder spasms  Will physical activity be limited?   Talk to your doctor about when you may go back to your normal activities like work, driving, or sex.  What problems could happen?   Bleeding  Infection  Injury to the bladder and urethra  Discomfort in the  urethra area  Burning sensation for a short time  Upset stomach  When do I need to call the doctor?   Signs of infection. These include a fever of 100.4°F (38°C) or higher, chills, pain with passing urine.  Pain that does not go away even with drugs or that lasts longer than 2 days  Too much blood in your urine  Passing large dime-sized clots  Cloudy urine  Little or no urine or not able to pass urine  Abdominal pain and nausea  Teach Back: Helping You Understand   The Teach Back Method helps you understand the information we are giving you. After you talk with the staff, tell them in your own words what you learned. This helps to make sure the staff has described each thing clearly. It also helps to explain things that may have been confusing. Before going home, make sure you can do these:  I can tell you about my procedure.  I can tell you what may help ease my pain.  I can tell you what I will do if I have a fever, chills, or am not able to pass urine.  Where can I learn more?   American Cancer Society  https://www.cancer.org/treatment/understanding-your-diagnosis/tests/endoscopy/cystoscopy.html   [employer]  https://www.cancerresearchuk.org/about-cancer/bladder-cancer/getting-diagnosed/tests-diagnose/cystoscopy   NHS Choices  http://www.nhs.uk/conditions/Cystoscopy/Pages/Introduction.aspx   Last Reviewed Date   2021-04-22  Consumer Information Use and Disclaimer   This information is not specific medical advice and does not replace information you receive from your health care provider. This is only a brief summary of general information. It does NOT include all information about conditions, illnesses, injuries, tests, procedures, treatments, therapies, discharge instructions or life-style choices that may apply to you. You must talk with your health care provider for complete information about your health and treatment options. This information should not be used to decide whether or not to accept your  health care providers advice, instructions or recommendations. Only your health care provider has the knowledge and training to provide advice that is right for you.  Copyright   Copyright © 2021 [employer] and its affiliates and/or licensors. All rights reserved.

## 2023-04-08 LAB — URINE CULTURE, ROUTINE: NORMAL

## 2023-04-14 ENCOUNTER — TELEPHONE (OUTPATIENT)
Dept: UROLOGY | Facility: CLINIC | Age: 58
End: 2023-04-14
Payer: MEDICAID

## 2023-04-14 NOTE — TELEPHONE ENCOUNTER
----- Message from Flory Crawford sent at 4/14/2023  3:43 PM CDT -----  Contact: Patient  Patient called to consult with nurse or staff regarding his upcoming procedure. He states he is not sure where the procedure is going to be performed and would like to verify with the office. He would like a call back and can be reached at [phone]. Thanks/MR

## 2023-04-14 NOTE — TELEPHONE ENCOUNTER
Called back number left in message below. A woman answered and stated he isnt there and that is not the correct number to reach him at. I asked if the other number ([phone]) in chart would be better and she said yes. Attempted to call the other number, beeps disconnected. [facility]   What Type Of Note Output Would You Prefer (Optional)?: Bullet Format How Severe Is Your Rash?: moderate Is This A New Presentation, Or A Follow-Up?: Rash

## 2023-04-17 ENCOUNTER — TELEPHONE (OUTPATIENT)
Dept: UROLOGY | Facility: CLINIC | Age: 58
End: 2023-04-17
Payer: MEDICAID

## 2023-04-17 LAB
ANION GAP SERPL CALC-SCNC: 11 MMOL/L (ref 3–11)
APPEARANCE, UA: ABNORMAL
BACTERIA SPEC CULT: ABNORMAL /HPF
BASOPHILS NFR BLD: 0.2 % (ref 0–3)
BILIRUB UR QL STRIP: NEGATIVE MG/DL
BUN SERPL-MCNC: 15 MG/DL (ref 7–18)
BUN/CREAT SERPL: 11.9 RATIO (ref 7–18)
CALCIUM SERPL-MCNC: 8.9 MG/DL (ref 8.8–10.5)
CHLORIDE SERPL-SCNC: 102 MMOL/L (ref 100–108)
CO2 SERPL-SCNC: 27 MMOL/L (ref 21–32)
COLOR UR: ABNORMAL
CREAT SERPL-MCNC: 1.26 MG/DL (ref 0.7–1.3)
EOSINOPHIL NFR BLD: 1.4 % (ref 1–3)
ERYTHROCYTE [DISTWIDTH] IN BLOOD BY AUTOMATED COUNT: 15.4 % (ref 12.5–18)
GFR ESTIMATION: 59
GLUCOSE (UA): NORMAL MG/DL
GLUCOSE SERPL-MCNC: 101 MG/DL (ref 70–110)
HCT VFR BLD AUTO: 43 % (ref 42–52)
HGB BLD-MCNC: 14.2 G/DL (ref 14–18)
HGB UR QL STRIP: 250 /UL
KETONES UR QL STRIP: ABNORMAL MG/DL
LEUKOCYTE ESTERASE UR QL STRIP: 500 /UL
LYMPHOCYTES NFR BLD: 15.1 % (ref 25–40)
MCH RBC QN AUTO: 31.3 PG (ref 27–31.2)
MCHC RBC AUTO-ENTMCNC: 33 G/DL (ref 31.8–35.4)
MCV RBC AUTO: 94.7 FL (ref 80–97)
MONOCYTES NFR BLD: 7.7 % (ref 1–15)
MUCUS URINE: ABNORMAL /LPF
NEUTROPHILS # BLD AUTO: 6.73 10*3/UL (ref 1.8–7.7)
NEUTROPHILS NFR BLD: 75 % (ref 37–80)
NITRITE UR QL STRIP: NEGATIVE
NUCLEATED RED BLOOD CELLS: 0 %
PH UR STRIP: 6 PH (ref 5–9)
PLATELETS: 285 10*3/UL (ref 142–424)
POTASSIUM SERPL-SCNC: 3.7 MMOL/L (ref 3.6–5.2)
PROT UR QL STRIP: 100 MG/DL
RBC # BLD AUTO: 4.54 10*6/UL (ref 4.7–6.1)
RBC #/AREA URNS HPF: ABNORMAL /HPF (ref 0–2)
SERVICE COMMENT 03: ABNORMAL
SODIUM BLD-SCNC: 140 MMOL/L (ref 135–145)
SP GR UR STRIP: 1.02 (ref 1–1.03)
SPECIMEN COLLECTION METHOD, URINE: ABNORMAL
SQUAMOUS EPITHELIAL, UA: ABNORMAL /LPF
UROBILINOGEN UR STRIP-ACNC: NORMAL MG/DL
WBC # BLD: 9 10*3/UL (ref 4.6–10.2)
WBC #/AREA URNS HPF: ABNORMAL /HPF (ref 0–5)

## 2023-04-18 ENCOUNTER — TELEPHONE (OUTPATIENT)
Dept: UROLOGY | Facility: CLINIC | Age: 58
End: 2023-04-18
Payer: MEDICAID

## 2023-04-18 NOTE — TELEPHONE ENCOUNTER
Attempted to return pt call. Left VM.    ----- Message from Tiki May sent at 4/18/2023  8:02 AM CDT -----  Type:  Needs Medical Advice    Who Called: Pt  Would the patient rather a call back or a response via [facility]? call  Best Call Back Number: [phone]  Additional Information: pt would like a call from nurse in office regarding his surgery being put off please call pt in regards to this matter

## 2023-04-18 NOTE — TELEPHONE ENCOUNTER
Rec'd new cardiac clearance 4/14/23 , and OV notes from Dr. Rios office.  Pt may hold Effient 5 days prior to procedure. Due to his CAD, it is recommend to remain on ASA 81 mg. Further need to hold ASA will be your discretion. Pt is at Moderate Risk.       Scanned into media and routed to [facility].       Tried calling pt to let him know to hold Effient, but line was busy. syl

## 2023-04-19 LAB — URINE CULTURE, ROUTINE: NORMAL

## 2023-04-24 ENCOUNTER — TELEPHONE (OUTPATIENT)
Dept: UROLOGY | Facility: CLINIC | Age: 58
End: 2023-04-24
Payer: MEDICAID

## 2023-04-24 NOTE — TELEPHONE ENCOUNTER
Attempted to contact pt phone, would not go through. I called sister and left VM stating we need cardiac clearance to reschedule his surgery. She stated she would have him contact us.     ----- Message from Meagan Negrete sent at 4/24/2023  2:35 PM CDT -----  Contact: self  Pt needs to reschedule cancelled appt due to having completed all required test pls call [phone] with appt details

## 2023-04-26 ENCOUNTER — OFFICE VISIT (OUTPATIENT)
Dept: HEMATOLOGY/ONCOLOGY | Facility: CLINIC | Age: 58
End: 2023-04-26
Payer: MEDICAID

## 2023-04-26 VITALS
TEMPERATURE: 98 F | BODY MASS INDEX: 29.76 KG/M2 | SYSTOLIC BLOOD PRESSURE: 120 MMHG | WEIGHT: 196.38 LBS | OXYGEN SATURATION: 98 % | DIASTOLIC BLOOD PRESSURE: 78 MMHG | HEART RATE: 82 BPM | RESPIRATION RATE: 16 BRPM | HEIGHT: 68 IN

## 2023-04-26 DIAGNOSIS — C67.9 MALIGNANT NEOPLASM OF URINARY BLADDER, UNSPECIFIED SITE: Primary | ICD-10-CM

## 2023-04-26 PROCEDURE — 3008F BODY MASS INDEX DOCD: CPT | Mod: CPTII,S$GLB,, | Performed by: INTERNAL MEDICINE

## 2023-04-26 PROCEDURE — 3078F PR MOST RECENT DIASTOLIC BLOOD PRESSURE < 80 MM HG: ICD-10-PCS | Mod: CPTII,S$GLB,, | Performed by: INTERNAL MEDICINE

## 2023-04-26 PROCEDURE — 3008F PR BODY MASS INDEX (BMI) DOCUMENTED: ICD-10-PCS | Mod: CPTII,S$GLB,, | Performed by: INTERNAL MEDICINE

## 2023-04-26 PROCEDURE — 3074F SYST BP LT 130 MM HG: CPT | Mod: CPTII,S$GLB,, | Performed by: INTERNAL MEDICINE

## 2023-04-26 PROCEDURE — 99215 PR OFFICE/OUTPT VISIT, EST, LEVL V, 40-54 MIN: ICD-10-PCS | Mod: S$GLB,,, | Performed by: INTERNAL MEDICINE

## 2023-04-26 PROCEDURE — 1159F MED LIST DOCD IN RCRD: CPT | Mod: CPTII,S$GLB,, | Performed by: INTERNAL MEDICINE

## 2023-04-26 PROCEDURE — 3078F DIAST BP <80 MM HG: CPT | Mod: CPTII,S$GLB,, | Performed by: INTERNAL MEDICINE

## 2023-04-26 PROCEDURE — 1159F PR MEDICATION LIST DOCUMENTED IN MEDICAL RECORD: ICD-10-PCS | Mod: CPTII,S$GLB,, | Performed by: INTERNAL MEDICINE

## 2023-04-26 PROCEDURE — 3074F PR MOST RECENT SYSTOLIC BLOOD PRESSURE < 130 MM HG: ICD-10-PCS | Mod: CPTII,S$GLB,, | Performed by: INTERNAL MEDICINE

## 2023-04-26 PROCEDURE — 99215 OFFICE O/P EST HI 40 MIN: CPT | Mod: S$GLB,,, | Performed by: INTERNAL MEDICINE

## 2023-04-26 NOTE — PROGRESS NOTES
MEDICAL ONCOLOGY FOLLOW UP  NOTE    Patient ID: Chucho Mack is a 58 y.o. male.    Chief Complaint: Bladder cancer    HPI:  Patient is a 55-year-old male with past medical history of anxiety, gastroesophageal reflux disease, hyperlipidemia, paranoid schizophrenia who  recently underwent transurethral resection of large bladder tumor > 8 cm by Urology and pathology was conclusive of bladder cancer.  Please see pathology report below. He presents to medical oncology clinic today for further evaluation. Reports hematuria post TURBT but doing better since then.    BLADDER TUMOR, TRANSURETHRAL RESECTION: 12/23/2020    - ADENOCARCINOMA, INVASIVE, MODERATELY DIFFERENTIATED.   - TUMOR INVADES MUSCULARIS PROPRIA.   - NEGATIVE FOR LYMPHOVASCULAR INVASION.   - SEE COMMENT.   Comment:   To further evaluate the specimen, the following immunohistochemical stains   were performed by [facility] laboratory, on block 1H with   adequate controls and with interpretation as indicated:      CK7:      negative 0030 S>     CK20:      positive      CDX2:      positive      p63:      negative   Primary adenocarcinomas of the bladder  may show significant morphologic and   immunohistochemical overlap with adenocarcinomas from other primary sites.   In this instance, the tumor shows striking morphologic and   immunohistochemical similarity to primary colorectal adenocarcinoma.   Careful correlation with endoscopic and radiographic findings is necessary   for determination of the definitive primary site.     Imaging:      CT Abdomen and pelvis: 10/7/2020    - Partially calcified soft tissue intraluminal lesion of the urinary bladder.  Malignancy least differential.  - heterogeneously enhancing complex 2.2 cm lesion of the right kidney.  - Cyst of the bilateral kidneys.  - hepatic steatosis with focal fatty sparing.  - colonic diverticular disease    CT chest w/o contrast: 1/25/2021  1.  Fatty infiltration of the liver.        2.   No other acute pathology    CT scan A/P: 5/13/2021    - The bladder mass is increased in size compared with CT from August 2020.  Is much carlos of the adjacent aurora-serosal fat may reflect invasion.  - there are several bilateral renal hypodensities, most of which are too small to characterize in terms of attenuation.  This is stable compared with prior exam.  The largest is a cyst in the left lower pole.  One that is indeterminate in the mid to lower pole the right kidney is probably large enough to characterize a cystic or solid by ultrasound.  Consider ultrasound of this lesion is not been evaluated previously.    Labs:  Lab Results   Component Value Date    WBC 9.0 04/17/2023    HGB 14.2 04/17/2023    HCT 43.0 04/17/2023    MCV 94.7 04/17/2023    LABPLAT 285 04/17/2023      Platelets   Date Value Ref Range Status   04/17/2023 285 142 - 424 10*3/uL Final     CMP  Sodium   Date Value Ref Range Status   04/17/2023 140 135 - 145 mmol/L Final     Potassium   Date Value Ref Range Status   04/17/2023 3.7 3.6 - 5.2 mmol/L Final     Chloride   Date Value Ref Range Status   04/17/2023 102 100 - 108 mmol/L Final     CO2   Date Value Ref Range Status   04/17/2023 27 21 - 32 mmol/L Final     Glucose   Date Value Ref Range Status   04/17/2023 101 70 - 110 mg/dL Final     BUN   Date Value Ref Range Status   04/17/2023 15 7 - 18 mg/dL Final     Creatinine   Date Value Ref Range Status   04/17/2023 1.26 0.70 - 1.30 mg/dL Final     Calcium   Date Value Ref Range Status   04/17/2023 8.9 8.8 - 10.5 mg/dL Final     Total Protein   Date Value Ref Range Status   09/08/2021 7.3 6.4 - 8.2 g/dL Final     Albumin   Date Value Ref Range Status   09/08/2021 3.4 3.4 - 5.0 g/dL Final     Total Bilirubin   Date Value Ref Range Status   09/08/2021 0.2 0.0 - 1.0 mg/dL Final     Alkaline Phosphatase   Date Value Ref Range Status   09/08/2021 81 46 - 116 U/L Final     AST   Date Value Ref Range Status   09/08/2021 33 15 - 37 U/L Final     ALT    Date Value Ref Range Status   2021 30 12 - 78 U/L Final     Anion Gap   Date Value Ref Range Status   2023 11.0 3.0 - 11.0 mmol/L Final            Past Medical History:   Diagnosis Date    Allergy     Anxiety     Bladder cancer 2021    Coronary artery disease     Depression     Essential (primary) hypertension     GERD (gastroesophageal reflux disease)     Hypercholesterolemia     Paranoid schizophrenia     Type II diabetes mellitus      Family History   Problem Relation Age of Onset    Coronary artery disease Mother     No Known Problems Father     No Known Problems Sister     No Known Problems Sister     No Known Problems Sister     No Known Problems Sister     No Known Problems Sister     Coronary artery disease Brother     Coronary artery disease Brother     Cancer Maternal Grandmother     Skin cancer Maternal Grandmother     Cancer Maternal Aunt     No Known Problems Maternal Uncle     No Known Problems Maternal Uncle     No Known Problems Maternal Uncle     No Known Problems Maternal Uncle     No Known Problems Maternal Uncle     No Known Problems Maternal Uncle     No Known Problems Maternal Uncle     Colon cancer Neg Hx     Colon polyps Neg Hx     Crohn's disease Neg Hx     Liver disease Neg Hx      Social History     Socioeconomic History    Marital status:    Tobacco Use    Smoking status: Every Day     Packs/day: 0.50     Years: 44.00     Pack years: 22.00     Types: Cigarettes     Start date: 1977     Last attempt to quit: 2020     Years since quittin.3    Smokeless tobacco: Never   Substance and Sexual Activity    Alcohol use: Not Currently    Drug use: Never     Past Surgical History:   Procedure Laterality Date    ANKLE SURGERY      BLADDER TUMOR EXCISION      2X 2021/ Dec 2022    CORONARY ANGIOPLASTY WITH STENT PLACEMENT      CYSTOSCOPY      FOOT FRACTURE SURGERY      HAND SURGERY Bilateral     WRIST SURGERY           Review of systems:  Review of Systems    Constitutional:  Negative for activity change, appetite change, chills, diaphoresis, fatigue and unexpected weight change.   HENT:  Negative for congestion, facial swelling, hearing loss, mouth sores, trouble swallowing and voice change.    Eyes:  Negative for photophobia, pain, discharge and itching.   Respiratory:  Negative for apnea, cough, choking, chest tightness and shortness of breath.    Cardiovascular:  Negative for chest pain, palpitations and leg swelling.   Gastrointestinal:  Negative for abdominal distention, abdominal pain, anal bleeding and blood in stool.   Endocrine: Negative for cold intolerance, heat intolerance, polydipsia and polyphagia.   Genitourinary:  Positive for hematuria. Negative for difficulty urinating, dysuria and flank pain.   Musculoskeletal:  Negative for arthralgias, back pain, joint swelling, myalgias, neck pain and neck stiffness.   Skin:  Negative for color change, pallor and wound.   Allergic/Immunologic: Negative for environmental allergies, food allergies and immunocompromised state.   Neurological:  Negative for dizziness, seizures, facial asymmetry, speech difficulty, light-headedness, numbness and headaches.   Hematological:  Negative for adenopathy. Does not bruise/bleed easily.   Psychiatric/Behavioral:  Negative for agitation, behavioral problems, confusion, decreased concentration and sleep disturbance.            Physical Exam  Vitals and nursing note reviewed.   Constitutional:       General: He is not in acute distress.     Appearance: Normal appearance. He is not ill-appearing.   HENT:      Head: Normocephalic and atraumatic.      Nose: No congestion or rhinorrhea.   Eyes:      General: No scleral icterus.     Extraocular Movements: Extraocular movements intact.      Pupils: Pupils are equal, round, and reactive to light.   Cardiovascular:      Rate and Rhythm: Normal rate and regular rhythm.      Pulses: Normal pulses.      Heart sounds: Normal heart sounds. No  murmur heard.    No gallop.   Pulmonary:      Effort: Pulmonary effort is normal. No respiratory distress.      Breath sounds: Normal breath sounds. No stridor. No wheezing or rhonchi.   Abdominal:      General: Bowel sounds are normal. There is no distension.      Palpations: There is no mass.      Tenderness: There is no abdominal tenderness. There is no guarding.   Musculoskeletal:         General: No swelling, tenderness, deformity or signs of injury. Normal range of motion.      Cervical back: Normal range of motion and neck supple. No rigidity. No muscular tenderness.      Right lower leg: No edema.      Left lower leg: No edema.   Skin:     General: Skin is warm.      Coloration: Skin is not jaundiced or pale.      Findings: No bruising or lesion.   Neurological:      General: No focal deficit present.      Mental Status: He is alert and oriented to person, place, and time.      Cranial Nerves: No cranial nerve deficit.      Sensory: No sensory deficit.      Motor: No weakness.      Gait: Gait normal.   Psychiatric:         Mood and Affect: Mood normal.         Behavior: Behavior normal.         Thought Content: Thought content normal.      There were no vitals filed for this visit.  There is no height or weight on file to calculate BSA.    Assessment/Plan:      ECOG 1    1. Invasive moderately differentiated muscle invasive bladder tumor s/p TURBT 12/21/2020: Stage IIIA  -- Prior CT scan done 08/2020 showed no evidence of lymphadenopathy. However based on the pathology report, we could be looking at primary colo-rectal adenocarcinoma with metastasis.  CT scan chest reviewed, so far no signs of distant metastatic disease.  -- Denies any bone pain so do not need any bone imaging as clinical suspicion for bone mets low unless  Alkaline phosphatase elevated.   -- Discussed with patient further management options for muscle invasive bladder cancer s/p TURBT and the pathology report showing findings with a  possible bladder cancer vs colo-rectal cancer primary. If not deemed bladder cancer than no specific role of radiation and will be treated with as metastatic colon cancer.  -- Discussed in TMB meeting 1/21/2020 and consensus decision to obtain C-scopy and if no mass evident on C-scopy, pathology would send for further testing/ ? Second opinion. Referral to GI was placed and he is scheduled to get C-scopy 1/26/2020. C-scopy did not reveal any suspicious lesions which patient delayed getting C-scopy for a long time    -- 3/11/2021: Patient did not present back to clinic until today after his last visit, end of Jan 2021. He also missed his GI appointment which was scheduled urgently at the time. He reports going through family stressors. I discussed with him and counseled about his non-compliance in coming to appointments. Will obtain restaging scans and will await his C-scopy results. Again counseled not to miss appointments.    -- 5/25/2021:  Patient presents to clinic after a delay of more than 2 months and again does not seem to have a valid reason for causing delay in his own care.  He reports having a recent visit to ER where he was noted to have a urinary tract infection and he also underwent CT scan A/P which just showed increase in size of the bladder mass to approximately 5-6 cm from 4 cm.  I discussed with patient in detail about future management options and also pathology confirming this to be a bladder tumor with pure enteric colonic morphology but no GI primary.  I understand this is a very unreliable patient and might not show up for chemotherapy, hence with the support of our staff we will  involve his family.  == Patient reports that he does not have the resources to undergo any type of radical surgery including a radical cystectomy as he already have transport issues even coming to clinic here and would be unable to go anywhere outside the city.  He would like to pursue with bladder preservation with  "concurrent chemoradiotherapy. I discussed with him that trimodality therapy (TMT) incorporating maximal TURBT followed by radiation therapy (RT) with concurrent radiosensitizing chemotherapy can be a comparably effective regimen to preserve a functioning bladder in well-selected patients with muscle-invasive bladder cancer.   == Will use 5FU Mitomycin with XRT. He is scheduled to see Rad-onc tomorrow and will need port/IV access prior to starting chemotherapy, pt is unable to get PORT, alternative plan is to get PICC line on Friday and proceed with Gemzar/cisplatin.     --6/18/21: s/p C1D1 &D8 gem/cis with XRT maegan well, mild nausea noted but controlled with prescribed oral antiemetics. Pt has noted less hematuria and feels overall "good"   --7/6/21:  Patient is one-week delay for cycle 2 due to transportation difficulties last week.  Patient did maintain most of daily radiation though.  Discussed with patient importance of maintaining chemotherapy appointments and will begin doing weekly labs with Kourtney at [facility].  Patient states feeling pretty good no real side effects current leaf from radiation or chemotherapy to complain of.  Plan is to proceed with cycle 2 day 1 on Thursday.  Return to infusion next Thursday for cycle 2 day 8 and return to clinic in 3 weeks prior to cycle 3  --7/29/21: pt here today to begin cycle 3. He is to complete XRT next week. So far tolerating tx very well. Labs reviewed and no new c/o.   --8/10/21: s/p c1d8, completed xrt this week, doing well, labs reviewed and pt cleared for chemo today.   --9/8/21: pt here in clinic today after 3 week absence due to mother passing. He missed his 4 th of chemo but would like to resume his chemo next week. Labs today reviewed and infusion is notified to call him to schedule C4D1 next week. Pt requesting pain medication refill.   Scans to be scheduled after cycle 4 is completed.   --9/29/21: pt here today after not completing cycle 4 or " doing ct scans. Counseled patient regarding need for repeat imaging for active surveillance of disease as well as tapering narcotic pain medication now that he has completed chemo/xrt. Today percocet is changed to Norco 5/325mg #30 and pt understands that this will be his final narcotic prescription provided. Orders placed today for PICC to be pulled at infusion.  He is to RTC in 3 weeks with CT scans.   ==4/26/23:  Patient presenting to this clinic after a gap of  2 years.  He reports several logistic issues for missing his appointments.  More recently he was seen by Urology for recurrent bladder cancer and plan for TURBT was made.  However it does not show patient has followed back with Urology.  He also reports getting CT scan done at an outside hospital but I do not have any reports available at this time.  I discussed with him about his several no-shows over the last 2 years and the need to look at dose restaging scan before we decide on our next steps.  I strongly encouraged him to keep his upcoming TURBT appointment with Urology and also showed him the note from urology team where they have attempted to contact him    2. CAD:    -- S/p stent placement 03/2021 and follows cardiology  -- Stable    Plan:  CT scan C/A/P with IV contrast, if these were not done recently  Keep f/up appointments with Urology    RTC in 4 weeks for MD visit with CBC, CMP prior    Total time spent in counseling and discussion was more than 60 minutes. I discussed in detail further management options at this time and discussion on relevant tests, imaging and ancillary tests as part of the management plan.

## 2023-04-27 ENCOUNTER — TELEPHONE (OUTPATIENT)
Dept: UROLOGY | Facility: CLINIC | Age: 58
End: 2023-04-27
Payer: MEDICAID

## 2023-04-27 NOTE — TELEPHONE ENCOUNTER
Dr. Bro is ok with pt staying on aspirin for surgery. Pt rescheduled.      ----- Message from Zena Childress sent at 4/27/2023  8:17 AM CDT -----  Contact: Pt  States he's calling rg his surgery getting scheduled and can be reached at [phone] - pt nephew faith sterling and can speak with him to get the surgery scheduled //thanks/dbw

## 2023-05-01 ENCOUNTER — OUTSIDE PLACE OF SERVICE (OUTPATIENT)
Dept: UROLOGY | Facility: CLINIC | Age: 58
End: 2023-05-01

## 2023-05-01 ENCOUNTER — TELEPHONE (OUTPATIENT)
Dept: UROLOGY | Facility: CLINIC | Age: 58
End: 2023-05-01

## 2023-05-01 DIAGNOSIS — C67.9 MALIGNANT NEOPLASM OF URINARY BLADDER, UNSPECIFIED SITE: Primary | ICD-10-CM

## 2023-05-01 LAB — GLUCOSE SERPL-MCNC: 94 MG/DL (ref 70–105)

## 2023-05-01 PROCEDURE — 52235 PR CYSTOURETHROSCOPY,FULGUR 2-5 CM LESN: ICD-10-PCS | Mod: ,,, | Performed by: UROLOGY

## 2023-05-01 PROCEDURE — 52235 CYSTOSCOPY AND TREATMENT: CPT | Mod: ,,, | Performed by: UROLOGY

## 2023-05-01 RX ORDER — CIPROFLOXACIN 500 MG/1
500 TABLET ORAL 2 TIMES DAILY
Qty: 6 TABLET | Refills: 0 | Status: SHIPPED | OUTPATIENT
Start: 2023-05-01 | End: 2023-05-04

## 2023-05-01 RX ORDER — HYDROCODONE BITARTRATE AND ACETAMINOPHEN 10; 325 MG/1; MG/1
1 TABLET ORAL EVERY 6 HOURS PRN
Qty: 10 TABLET | Refills: 0 | Status: SHIPPED | OUTPATIENT
Start: 2023-05-01 | End: 2023-05-11

## 2023-05-01 NOTE — TELEPHONE ENCOUNTER
Spoke with pt and informed of chest CT results. I informed pt I have sent results to Sal Sewell, DORI. We have ordered additional imaging and pt is aware.

## 2023-05-02 ENCOUNTER — TELEPHONE (OUTPATIENT)
Dept: UROLOGY | Facility: CLINIC | Age: 58
End: 2023-05-02
Payer: MEDICAID

## 2023-05-02 NOTE — TELEPHONE ENCOUNTER
----- Message from Caridad Cruz sent at 5/2/2023  2:55 PM CDT -----  [facility]  Pt is in office waiting. She can be reached at [phone].    Thanks,

## 2023-05-02 NOTE — TELEPHONE ENCOUNTER
Hailey [facility] spoke with office and faxed CT results. [facility] showed [facility] to [facility] office[facility]

## 2023-05-03 ENCOUNTER — TELEPHONE (OUTPATIENT)
Dept: UROLOGY | Facility: CLINIC | Age: 58
End: 2023-05-03
Payer: MEDICAID

## 2023-05-03 NOTE — TELEPHONE ENCOUNTER
Pt calling for more pain medication post turbt on Monday. I informed pt that we would not be able to send more refills out and that he can take otc tylenol. Pt states his PCP did receive his records on CT chest.       ----- Message from Effie Johnson sent at 5/3/2023  8:34 AM CDT -----  Contact: self  Type:  RX Refill Request    Who Called: pt  Refill or New Rx:refill  RX Name and Strength:HYDROcodone-acetaminophen (NORCO)  mg per tablet  How is the patient currently taking it? (ex. 1XDay):Take 1 tablet by mouth every 6 (six) hours as needed for Pain  Preferred Pharmacy with phone number:  Is this a 30 day or 90 day RX:10  Local or Mail Order:  [facility] - [address] [address]  Phone: [phone] Fax: [phone]     Ordering Provider:tamy  Would the patient rather a call back or a response via [facility]? Call   Best Call Back Number:[phone]   Additional Information: patient states that he doesn't have any pain medication or pain management yet

## 2023-05-04 LAB — SPECIMEN TO PATHOLOGY: NORMAL

## 2023-05-08 ENCOUNTER — TELEPHONE (OUTPATIENT)
Dept: GASTROENTEROLOGY | Facility: CLINIC | Age: 58
End: 2023-05-08
Payer: MEDICAID

## 2023-05-08 NOTE — TELEPHONE ENCOUNTER
According to Cardiology note from last month the patient was supposed to be taking Effient.  He reported not taking any of his medications.  At the end of the note it says aspirin or Plavix prescribed for patient.  Asked patient if he is taking any of the blood thinners including Effient or Plavix or aspirin.  If so then he should discontinue these medications five days before his colonoscopy with me.  NBP

## 2023-05-09 ENCOUNTER — TELEPHONE (OUTPATIENT)
Dept: UROLOGY | Facility: CLINIC | Age: 58
End: 2023-05-09
Payer: MEDICAID

## 2023-05-09 NOTE — TELEPHONE ENCOUNTER
Attempted to return pt call twice, could not leave VM due to mailbox being too full.     ----- Message from Effie Johnson sent at 5/9/2023  8:25 AM CDT -----  Contact: self  Pt is calling in regards to a Chest and pelvis xray to be sent over to [facility]. Please call pt at [phone]

## 2023-05-11 ENCOUNTER — TELEPHONE (OUTPATIENT)
Dept: HEMATOLOGY/ONCOLOGY | Facility: CLINIC | Age: 58
End: 2023-05-11
Payer: MEDICAID

## 2023-05-11 ENCOUNTER — OFFICE VISIT (OUTPATIENT)
Dept: UROLOGY | Facility: CLINIC | Age: 58
End: 2023-05-11
Payer: MEDICAID

## 2023-05-11 ENCOUNTER — TELEPHONE (OUTPATIENT)
Dept: UROLOGY | Facility: CLINIC | Age: 58
End: 2023-05-11

## 2023-05-11 VITALS
DIASTOLIC BLOOD PRESSURE: 102 MMHG | SYSTOLIC BLOOD PRESSURE: 163 MMHG | HEIGHT: 68 IN | WEIGHT: 196 LBS | RESPIRATION RATE: 18 BRPM | BODY MASS INDEX: 29.7 KG/M2 | HEART RATE: 91 BPM

## 2023-05-11 DIAGNOSIS — R30.0 DYSURIA: Primary | ICD-10-CM

## 2023-05-11 DIAGNOSIS — C67.9 MALIGNANT NEOPLASM OF URINARY BLADDER, UNSPECIFIED SITE: ICD-10-CM

## 2023-05-11 LAB
BILIRUBIN, UA POC OHS: NEGATIVE
BLOOD, UA POC OHS: ABNORMAL
CLARITY, UA POC OHS: CLEAR
COLOR, UA POC OHS: YELLOW
GLUCOSE, UA POC OHS: NEGATIVE
KETONES, UA POC OHS: NEGATIVE
LEUKOCYTES, UA POC OHS: ABNORMAL
NITRITE, UA POC OHS: NEGATIVE
PH, UA POC OHS: 6
PROTEIN, UA POC OHS: 100
SPECIFIC GRAVITY, UA POC OHS: 1.02
UROBILINOGEN, UA POC OHS: 0.2

## 2023-05-11 PROCEDURE — 99024 POSTOP FOLLOW-UP VISIT: CPT | Mod: S$GLB,,, | Performed by: UROLOGY

## 2023-05-11 PROCEDURE — 1159F MED LIST DOCD IN RCRD: CPT | Mod: CPTII,S$GLB,, | Performed by: UROLOGY

## 2023-05-11 PROCEDURE — 3080F DIAST BP >= 90 MM HG: CPT | Mod: CPTII,S$GLB,, | Performed by: UROLOGY

## 2023-05-11 PROCEDURE — 1160F RVW MEDS BY RX/DR IN RCRD: CPT | Mod: CPTII,S$GLB,, | Performed by: UROLOGY

## 2023-05-11 PROCEDURE — 81003 URINALYSIS AUTO W/O SCOPE: CPT | Mod: QW,S$GLB,, | Performed by: UROLOGY

## 2023-05-11 PROCEDURE — 1160F PR REVIEW ALL MEDS BY PRESCRIBER/CLIN PHARMACIST DOCUMENTED: ICD-10-PCS | Mod: CPTII,S$GLB,, | Performed by: UROLOGY

## 2023-05-11 PROCEDURE — 99024 PR POST-OP FOLLOW-UP VISIT: ICD-10-PCS | Mod: S$GLB,,, | Performed by: UROLOGY

## 2023-05-11 PROCEDURE — 81003 POCT URINALYSIS(INSTRUMENT): ICD-10-PCS | Mod: QW,S$GLB,, | Performed by: UROLOGY

## 2023-05-11 PROCEDURE — 3008F BODY MASS INDEX DOCD: CPT | Mod: CPTII,S$GLB,, | Performed by: UROLOGY

## 2023-05-11 PROCEDURE — 3008F PR BODY MASS INDEX (BMI) DOCUMENTED: ICD-10-PCS | Mod: CPTII,S$GLB,, | Performed by: UROLOGY

## 2023-05-11 PROCEDURE — 3077F SYST BP >= 140 MM HG: CPT | Mod: CPTII,S$GLB,, | Performed by: UROLOGY

## 2023-05-11 PROCEDURE — 3080F PR MOST RECENT DIASTOLIC BLOOD PRESSURE >= 90 MM HG: ICD-10-PCS | Mod: CPTII,S$GLB,, | Performed by: UROLOGY

## 2023-05-11 PROCEDURE — 3077F PR MOST RECENT SYSTOLIC BLOOD PRESSURE >= 140 MM HG: ICD-10-PCS | Mod: CPTII,S$GLB,, | Performed by: UROLOGY

## 2023-05-11 PROCEDURE — 1159F PR MEDICATION LIST DOCUMENTED IN MEDICAL RECORD: ICD-10-PCS | Mod: CPTII,S$GLB,, | Performed by: UROLOGY

## 2023-05-11 RX ORDER — HYDROCODONE BITARTRATE AND ACETAMINOPHEN 10; 325 MG/1; MG/1
1 TABLET ORAL EVERY 6 HOURS PRN
Qty: 10 TABLET | Refills: 0 | Status: SHIPPED | OUTPATIENT
Start: 2023-05-11 | End: 2023-05-11

## 2023-05-11 RX ORDER — HYDROCODONE BITARTRATE AND ACETAMINOPHEN 10; 325 MG/1; MG/1
1 TABLET ORAL EVERY 6 HOURS PRN
Qty: 10 TABLET | Refills: 0 | Status: SHIPPED | OUTPATIENT
Start: 2023-05-11 | End: 2023-07-18 | Stop reason: SDUPTHER

## 2023-05-11 NOTE — TELEPHONE ENCOUNTER
----- Message from Meagan Negrete sent at 5/11/2023 12:19 PM CDT -----  Contact: self  Type:  RX Refill Request    Who Called: Chucho Mack  Refill or New Rx:new  RX Name and Strength:percoset 7.5  How is the patient currently taking it? (ex. 1XDay):daily  Is this a 30 day or 90 day RX:30  Preferred Pharmacy with phone number:  [facility] - [address] [address]  Phone: [phone] Fax: [phone]    Local or Mail Order:local  Ordering Provider:tamy  Would the patient rather a call back or a response via [facility]? Call back  Best Call Back Number:[phone]  Additional Information: n/a

## 2023-05-11 NOTE — PROGRESS NOTES
Postop TURBT.  Patient is a 58-year-old male who had adenocarcinoma of the bladder diagnosed in 2020 subsequently was seen by Oncology underwent chemo radiation was lost to follow-up apparently patient does have some issues with timely follow-up underwent repeat TURBT due to multiple recurrences in the bladder these were found to be invasive adenocarcinoma high-grade.  Patient has been losing weight significant amount although he is on these diabetes shots.  I will refer the patient back to Oncology as he has persistent disease after treatment

## 2023-05-22 ENCOUNTER — TELEPHONE (OUTPATIENT)
Dept: PLASTIC SURGERY | Facility: CLINIC | Age: 58
End: 2023-05-22
Payer: MEDICAID

## 2023-05-22 ENCOUNTER — TELEPHONE (OUTPATIENT)
Dept: UROLOGY | Facility: CLINIC | Age: 58
End: 2023-05-22
Payer: MEDICAID

## 2023-05-22 DIAGNOSIS — C67.9 MALIGNANT NEOPLASM OF URINARY BLADDER, UNSPECIFIED SITE: Primary | ICD-10-CM

## 2023-05-22 DIAGNOSIS — R91.1 SOLITARY PULMONARY NODULE: ICD-10-CM

## 2023-05-22 NOTE — TELEPHONE ENCOUNTER
----- Message from Meagan Negrete sent at 5/22/2023  9:35 AM CDT -----  Contact: usha Rueda is at [facility] for chest x ray and labs but the orders are for a CT Scan, and [facility] is requesting a call back regarding insurance authorization for procedure pls call [phone]

## 2023-05-22 NOTE — TELEPHONE ENCOUNTER
----- Message from Callie Gonsalves sent at 5/22/2023  8:18 AM CDT -----  Contact: self  Patient is needing the doctor's office to send orders for x-ray to [facility] so he can have it done there. Please call back at [phone] if any questions/concerns    Pt states he is at the hospital now, just finished the lab work. And he will need the DVD copy of it afterwards, and needs it to be shared with Dr Brock.

## 2023-05-22 NOTE — TELEPHONE ENCOUNTER
@[address] order faxed to [facility] at [phone]. Attempted to contact patient with no answer.      LVM to notify Anabell in [facility] pre admitiing

## 2023-05-24 ENCOUNTER — OFFICE VISIT (OUTPATIENT)
Dept: HEMATOLOGY/ONCOLOGY | Facility: CLINIC | Age: 58
End: 2023-05-24
Payer: MEDICAID

## 2023-05-24 VITALS
HEIGHT: 68 IN | TEMPERATURE: 98 F | WEIGHT: 192.63 LBS | BODY MASS INDEX: 29.2 KG/M2 | HEART RATE: 81 BPM | RESPIRATION RATE: 16 BRPM | DIASTOLIC BLOOD PRESSURE: 99 MMHG | OXYGEN SATURATION: 98 % | SYSTOLIC BLOOD PRESSURE: 180 MMHG

## 2023-05-24 DIAGNOSIS — C67.9 MALIGNANT NEOPLASM OF URINARY BLADDER, UNSPECIFIED SITE: Primary | ICD-10-CM

## 2023-05-24 DIAGNOSIS — C67.9 MALIGNANT NEOPLASM OF URINARY BLADDER, UNSPECIFIED SITE: ICD-10-CM

## 2023-05-24 LAB
ALBUMIN SERPL BCP-MCNC: 3.4 G/DL (ref 3.4–5)
ALBUMIN/GLOBULIN RATIO: 0.74 RATIO (ref 1.1–1.8)
ALP SERPL-CCNC: 88 U/L (ref 46–116)
ALT SERPL W P-5'-P-CCNC: 21 U/L (ref 12–78)
ANION GAP SERPL CALC-SCNC: 6 MMOL/L (ref 3–11)
AST SERPL-CCNC: 23 U/L (ref 15–37)
BASOPHILS NFR BLD: 0.1 % (ref 0–3)
BILIRUB SERPL-MCNC: 0.3 MG/DL (ref 0–1)
BUN SERPL-MCNC: 16 MG/DL (ref 7–18)
BUN/CREAT SERPL: 13.67 RATIO (ref 7–18)
CALCIUM SERPL-MCNC: 9.1 MG/DL (ref 8.8–10.5)
CHLORIDE SERPL-SCNC: 104 MMOL/L (ref 100–108)
CO2 SERPL-SCNC: 30 MMOL/L (ref 21–32)
CREAT SERPL-MCNC: 1.17 MG/DL (ref 0.7–1.3)
EOSINOPHIL NFR BLD: 1.8 % (ref 1–3)
ERYTHROCYTE [DISTWIDTH] IN BLOOD BY AUTOMATED COUNT: 14.6 % (ref 12.5–18)
GFR ESTIMATION: > 60
GLOBULIN: 4.6 G/DL (ref 2.3–3.5)
GLUCOSE SERPL-MCNC: 108 MG/DL (ref 70–110)
HCT VFR BLD AUTO: 43.7 % (ref 42–52)
HGB BLD-MCNC: 14.9 G/DL (ref 14–18)
LYMPHOCYTES NFR BLD: 25.3 % (ref 25–40)
MCH RBC QN AUTO: 32 PG (ref 27–31.2)
MCHC RBC AUTO-ENTMCNC: 34.1 G/DL (ref 31.8–35.4)
MCV RBC AUTO: 93.8 FL (ref 80–97)
MONOCYTES NFR BLD: 8.5 % (ref 1–15)
NEUTROPHILS # BLD AUTO: 4.87 10*3/UL (ref 1.8–7.7)
NEUTROPHILS NFR BLD: 63.8 % (ref 37–80)
NUCLEATED RED BLOOD CELLS: 0 %
PLATELETS: 241 10*3/UL (ref 142–424)
POTASSIUM SERPL-SCNC: 4 MMOL/L (ref 3.6–5.2)
PROT SERPL-MCNC: 8 G/DL (ref 6.4–8.2)
RBC # BLD AUTO: 4.66 10*6/UL (ref 4.7–6.1)
SODIUM BLD-SCNC: 140 MMOL/L (ref 135–145)
WBC # BLD: 7.6 10*3/UL (ref 4.6–10.2)

## 2023-05-24 PROCEDURE — 99214 OFFICE O/P EST MOD 30 MIN: CPT | Mod: S$GLB,,, | Performed by: INTERNAL MEDICINE

## 2023-05-24 PROCEDURE — 99214 PR OFFICE/OUTPT VISIT, EST, LEVL IV, 30-39 MIN: ICD-10-PCS | Mod: S$GLB,,, | Performed by: INTERNAL MEDICINE

## 2023-05-24 NOTE — PROGRESS NOTES
MEDICAL ONCOLOGY FOLLOW UP  NOTE    Patient ID: Chucho Mack is a 58 y.o. male.    Chief Complaint: Bladder cancer    HPI:  Patient is a 55-year-old male with past medical history of anxiety, gastroesophageal reflux disease, hyperlipidemia, paranoid schizophrenia who  recently underwent transurethral resection of large bladder tumor > 8 cm by Urology and pathology was conclusive of bladder cancer.  Please see pathology report below. He presents to medical oncology clinic today for further evaluation. Reports hematuria post TURBT but doing better since then.    BLADDER TUMOR, TRANSURETHRAL RESECTION: 12/23/2020    - ADENOCARCINOMA, INVASIVE, MODERATELY DIFFERENTIATED.   - TUMOR INVADES MUSCULARIS PROPRIA.   - NEGATIVE FOR LYMPHOVASCULAR INVASION.   - SEE COMMENT.   Comment:   To further evaluate the specimen, the following immunohistochemical stains   were performed by [facility] laboratory, on block 1H with   adequate controls and with interpretation as indicated:      CK7:      negative 0030 S>     CK20:      positive      CDX2:      positive      p63:      negative   Primary adenocarcinomas of the bladder  may show significant morphologic and   immunohistochemical overlap with adenocarcinomas from other primary sites.   In this instance, the tumor shows striking morphologic and   immunohistochemical similarity to primary colorectal adenocarcinoma.   Careful correlation with endoscopic and radiographic findings is necessary   for determination of the definitive primary site.     Imaging:      CT Abdomen and pelvis: 10/7/2020    - Partially calcified soft tissue intraluminal lesion of the urinary bladder.  Malignancy least differential.  - heterogeneously enhancing complex 2.2 cm lesion of the right kidney.  - Cyst of the bilateral kidneys.  - hepatic steatosis with focal fatty sparing.  - colonic diverticular disease    CT chest w/o contrast: 1/25/2021  1.  Fatty infiltration of the liver.        2.   No other acute pathology    CT scan A/P: 5/13/2021    - The bladder mass is increased in size compared with CT from August 2020.  Is much carlos of the adjacent aurora-serosal fat may reflect invasion.  - there are several bilateral renal hypodensities, most of which are too small to characterize in terms of attenuation.  This is stable compared with prior exam.  The largest is a cyst in the left lower pole.  One that is indeterminate in the mid to lower pole the right kidney is probably large enough to characterize a cystic or solid by ultrasound.  Consider ultrasound of this lesion is not been evaluated previously.    Labs:  Lab Results   Component Value Date    WBC 9.0 04/17/2023    HGB 14.2 04/17/2023    HCT 43.0 04/17/2023    MCV 94.7 04/17/2023    LABPLAT 285 04/17/2023      Platelets   Date Value Ref Range Status   04/17/2023 285 142 - 424 10*3/uL Final     CMP  Sodium   Date Value Ref Range Status   04/17/2023 140 135 - 145 mmol/L Final     Potassium   Date Value Ref Range Status   04/17/2023 3.7 3.6 - 5.2 mmol/L Final     Chloride   Date Value Ref Range Status   04/17/2023 102 100 - 108 mmol/L Final     CO2   Date Value Ref Range Status   04/17/2023 27 21 - 32 mmol/L Final     Glucose   Date Value Ref Range Status   04/17/2023 101 70 - 110 mg/dL Final     BUN   Date Value Ref Range Status   04/17/2023 15 7 - 18 mg/dL Final     Creatinine   Date Value Ref Range Status   04/17/2023 1.26 0.70 - 1.30 mg/dL Final     Calcium   Date Value Ref Range Status   04/17/2023 8.9 8.8 - 10.5 mg/dL Final     Total Protein   Date Value Ref Range Status   09/08/2021 7.3 6.4 - 8.2 g/dL Final     Albumin   Date Value Ref Range Status   09/08/2021 3.4 3.4 - 5.0 g/dL Final     Total Bilirubin   Date Value Ref Range Status   09/08/2021 0.2 0.0 - 1.0 mg/dL Final     Alkaline Phosphatase   Date Value Ref Range Status   09/08/2021 81 46 - 116 U/L Final     AST   Date Value Ref Range Status   09/08/2021 33 15 - 37 U/L Final     ALT    Date Value Ref Range Status   2021 30 12 - 78 U/L Final     Anion Gap   Date Value Ref Range Status   2023 11.0 3.0 - 11.0 mmol/L Final            Past Medical History:   Diagnosis Date    Allergy     Anxiety     Bladder cancer 2021    Coronary artery disease     Depression     Essential (primary) hypertension     GERD (gastroesophageal reflux disease)     Hypercholesterolemia     Paranoid schizophrenia     Type II diabetes mellitus     Urinary tract infection      Family History   Problem Relation Age of Onset    Coronary artery disease Mother     No Known Problems Father     No Known Problems Sister     No Known Problems Sister     No Known Problems Sister     No Known Problems Sister     No Known Problems Sister     Coronary artery disease Brother     Coronary artery disease Brother     Cancer Maternal Grandmother     Skin cancer Maternal Grandmother     Cancer Maternal Aunt     No Known Problems Maternal Uncle     No Known Problems Maternal Uncle     No Known Problems Maternal Uncle     No Known Problems Maternal Uncle     No Known Problems Maternal Uncle     No Known Problems Maternal Uncle     No Known Problems Maternal Uncle     Colon cancer Neg Hx     Colon polyps Neg Hx     Crohn's disease Neg Hx     Liver disease Neg Hx      Social History     Socioeconomic History    Marital status:    Tobacco Use    Smoking status: Every Day     Packs/day: 0.50     Years: 44.00     Pack years: 22.00     Types: Cigarettes     Start date: 1977     Last attempt to quit: 2020     Years since quittin.4    Smokeless tobacco: Never   Substance and Sexual Activity    Alcohol use: Not Currently    Drug use: Never     Past Surgical History:   Procedure Laterality Date    ANKLE SURGERY      BLADDER TUMOR EXCISION      2X 2021/ Dec 2022    CORONARY ANGIOPLASTY WITH STENT PLACEMENT      CYSTOSCOPY      FOOT FRACTURE SURGERY      HAND SURGERY Bilateral     TURBT (TRANSURETHRAL RESECTION OF  BLADDER TUMOR)  05/01/2023    WRIST SURGERY           Review of systems:  Review of Systems   Constitutional:  Negative for activity change, appetite change, chills, diaphoresis, fatigue and unexpected weight change.   HENT:  Negative for congestion, facial swelling, hearing loss, mouth sores, trouble swallowing and voice change.    Eyes:  Negative for photophobia, pain, discharge and itching.   Respiratory:  Negative for apnea, cough, choking, chest tightness and shortness of breath.    Cardiovascular:  Negative for chest pain, palpitations and leg swelling.   Gastrointestinal:  Negative for abdominal distention, abdominal pain, anal bleeding and blood in stool.   Endocrine: Negative for cold intolerance, heat intolerance, polydipsia and polyphagia.   Genitourinary:  Positive for hematuria. Negative for difficulty urinating, dysuria and flank pain.   Musculoskeletal:  Negative for arthralgias, back pain, joint swelling, myalgias, neck pain and neck stiffness.   Skin:  Negative for color change, pallor and wound.   Allergic/Immunologic: Negative for environmental allergies, food allergies and immunocompromised state.   Neurological:  Negative for dizziness, seizures, facial asymmetry, speech difficulty, light-headedness, numbness and headaches.   Hematological:  Negative for adenopathy. Does not bruise/bleed easily.   Psychiatric/Behavioral:  Negative for agitation, behavioral problems, confusion, decreased concentration and sleep disturbance.            Physical Exam  Vitals and nursing note reviewed.   Constitutional:       General: He is not in acute distress.     Appearance: Normal appearance. He is not ill-appearing.   HENT:      Head: Normocephalic and atraumatic.      Nose: No congestion or rhinorrhea.   Eyes:      General: No scleral icterus.     Extraocular Movements: Extraocular movements intact.      Pupils: Pupils are equal, round, and reactive to light.   Cardiovascular:      Rate and Rhythm: Normal  rate and regular rhythm.      Pulses: Normal pulses.      Heart sounds: Normal heart sounds. No murmur heard.    No gallop.   Pulmonary:      Effort: Pulmonary effort is normal. No respiratory distress.      Breath sounds: Normal breath sounds. No stridor. No wheezing or rhonchi.   Abdominal:      General: Bowel sounds are normal. There is no distension.      Palpations: There is no mass.      Tenderness: There is no abdominal tenderness. There is no guarding.   Musculoskeletal:         General: No swelling, tenderness, deformity or signs of injury. Normal range of motion.      Cervical back: Normal range of motion and neck supple. No rigidity. No muscular tenderness.      Right lower leg: No edema.      Left lower leg: No edema.   Skin:     General: Skin is warm.      Coloration: Skin is not jaundiced or pale.      Findings: No bruising or lesion.   Neurological:      General: No focal deficit present.      Mental Status: He is alert and oriented to person, place, and time.      Cranial Nerves: No cranial nerve deficit.      Sensory: No sensory deficit.      Motor: No weakness.      Gait: Gait normal.   Psychiatric:         Mood and Affect: Mood normal.         Behavior: Behavior normal.         Thought Content: Thought content normal.      There were no vitals filed for this visit.  There is no height or weight on file to calculate BSA.    Assessment/Plan:      ECOG 1    1. Invasive moderately differentiated muscle invasive bladder tumor s/p TURBT 12/21/2020: Stage IIIA  -- Prior CT scan done 08/2020 showed no evidence of lymphadenopathy. However based on the pathology report, we could be looking at primary colo-rectal adenocarcinoma with metastasis.  CT scan chest reviewed, so far no signs of distant metastatic disease.  -- Denies any bone pain so do not need any bone imaging as clinical suspicion for bone mets low unless  Alkaline phosphatase elevated.   -- Discussed with patient further management options for  muscle invasive bladder cancer s/p TURBT and the pathology report showing findings with a possible bladder cancer vs colo-rectal cancer primary. If not deemed bladder cancer than no specific role of radiation and will be treated with as metastatic colon cancer.  -- Discussed in TMB meeting 1/21/2020 and consensus decision to obtain C-scopy and if no mass evident on C-scopy, pathology would send for further testing/ ? Second opinion. Referral to GI was placed and he is scheduled to get C-scopy 1/26/2020. C-scopy did not reveal any suspicious lesions which patient delayed getting C-scopy for a long time    -- 3/11/2021: Patient did not present back to clinic until today after his last visit, end of Jan 2021. He also missed his GI appointment which was scheduled urgently at the time. He reports going through family stressors. I discussed with him and counseled about his non-compliance in coming to appointments. Will obtain restaging scans and will await his C-scopy results. Again counseled not to miss appointments.    -- 5/25/2021:  Patient presents to clinic after a delay of more than 2 months and again does not seem to have a valid reason for causing delay in his own care.  He reports having a recent visit to ER where he was noted to have a urinary tract infection and he also underwent CT scan A/P which just showed increase in size of the bladder mass to approximately 5-6 cm from 4 cm.  I discussed with patient in detail about future management options and also pathology confirming this to be a bladder tumor with pure enteric colonic morphology but no GI primary.  I understand this is a very unreliable patient and might not show up for chemotherapy, hence with the support of our staff we will  involve his family.  == Patient reports that he does not have the resources to undergo any type of radical surgery including a radical cystectomy as he already have transport issues even coming to clinic here and would be  "unable to go anywhere outside the city.  He would like to pursue with bladder preservation with concurrent chemoradiotherapy. I discussed with him that trimodality therapy (TMT) incorporating maximal TURBT followed by radiation therapy (RT) with concurrent radiosensitizing chemotherapy can be a comparably effective regimen to preserve a functioning bladder in well-selected patients with muscle-invasive bladder cancer.   == Will use 5FU Mitomycin with XRT. He is scheduled to see Rad-onc tomorrow and will need port/IV access prior to starting chemotherapy, pt is unable to get PORT, alternative plan is to get PICC line on Friday and proceed with Gemzar/cisplatin.     --6/18/21: s/p C1D1 &D8 gem/cis with XRT maegan well, mild nausea noted but controlled with prescribed oral antiemetics. Pt has noted less hematuria and feels overall "good"   --7/6/21:  Patient is one-week delay for cycle 2 due to transportation difficulties last week.  Patient did maintain most of daily radiation though.  Discussed with patient importance of maintaining chemotherapy appointments and will begin doing weekly labs with Kourtney at [facility].  Patient states feeling pretty good no real side effects current leaf from radiation or chemotherapy to complain of.  Plan is to proceed with cycle 2 day 1 on Thursday.  Return to infusion next Thursday for cycle 2 day 8 and return to clinic in 3 weeks prior to cycle 3  --7/29/21: pt here today to begin cycle 3. He is to complete XRT next week. So far tolerating tx very well. Labs reviewed and no new c/o.   --8/10/21: s/p c1d8, completed xrt this week, doing well, labs reviewed and pt cleared for chemo today.   --9/8/21: pt here in clinic today after 3 week absence due to mother passing. He missed his 4 th of chemo but would like to resume his chemo next week. Labs today reviewed and infusion is notified to call him to schedule C4D1 next week. Pt requesting pain medication refill.   Scans to be " scheduled after cycle 4 is completed.   --9/29/21: pt here today after not completing cycle 4 or doing ct scans. Counseled patient regarding need for repeat imaging for active surveillance of disease as well as tapering narcotic pain medication now that he has completed chemo/xrt. Today percocet is changed to Norco 5/325mg #30 and pt understands that this will be his final narcotic prescription provided. Orders placed today for PICC to be pulled at infusion.  He is to RTC in 3 weeks with CT scans.   ==4/26/23:  Patient presenting to this clinic after a gap of  2 years.  He reports several logistic issues for missing his appointments.  More recently he was seen by Urology for recurrent bladder cancer and plan for TURBT was made.  However it does not show patient has followed back with Urology.  He also reports getting CT scan done at an outside hospital but I do not have any reports available at this time.  I discussed with him about his several no-shows over the last 2 years and the need to look at dose restaging scan before we decide on our next steps.  I strongly encouraged him to keep his upcoming TURBT appointment with Urology and also showed him the note from urology team where they have attempted to contact him.  == 5/24/23:  Patient underwent repeat TURBT and per Urology note he had multiple recurrences in the bladder which were found to be invasive adenocarcinoma high-grade.  Please see the discussion above where patient has repeatedly missed chemo/radiation appointments in the past and more recently presented after a gap of 2 years.  Patient was seen on 05/11/2023 by Dr. Bro post TURBT and he was referred back to Oncology as he had persistent disease after treatment.  Patient was also discussed in tumor board in I am unsure how we can reliably give any kind of treatment to a patient who does not show up persistently.  I will order a PET scan for restaging and to evaluate the lung nodules noted on recent  CT scan.  For a patient who was repeatedly noncompliant and does not show up and disappears during chemotherapy I do not feel I have much to offer at this time.  If he is confirmed stage IV based on PET scan results then palliative gemcitabine and carboplatin followed by avelumab maintenance therapy is a consideration.  PET scan has been ordered for restaging. Also depending on his stage and symptoms-- palliative XRT could have a role but he has had multiple no shows with Rad-onc in the past as well.    2. CAD:    -- S/p stent placement 03/2021 and follows cardiology  -- Stable    Plan:  PET scan for restaging      RTC in 2 weeks for MD visit with CBC, CMP prior    Total time spent in counseling and discussion was more than 25 minutes. I discussed in detail further management options at this time and discussion on relevant tests, imaging and ancillary tests as part of the management plan.

## 2023-05-26 ENCOUNTER — TELEPHONE (OUTPATIENT)
Dept: HEMATOLOGY/ONCOLOGY | Facility: CLINIC | Age: 58
End: 2023-05-26
Payer: MEDICAID

## 2023-05-26 NOTE — TELEPHONE ENCOUNTER
Called [facility] to see if the patient had his scan done with them. Neither of them had him in their system. Will ask the patient where is scan was done when he comes in.

## 2023-05-30 ENCOUNTER — TELEPHONE (OUTPATIENT)
Dept: UROLOGY | Facility: CLINIC | Age: 58
End: 2023-05-30
Payer: MEDICAID

## 2023-05-30 NOTE — TELEPHONE ENCOUNTER
----- Message from Tala Tee NP sent at 5/30/2023  9:37 AM CDT -----  Please notify patient of CT results. He is already being seen by Dr. Brock and has several other imaging orders. Instruct patient to follow up with Dr. Brock. If their office needs referral to pulmonology we can also send that referral.

## 2023-05-30 NOTE — TELEPHONE ENCOUNTER
Spoke with niece who answered on the [phone]# she stated Mr. Mcak was not with her at this time, I told her to let him know to call us back regarding lab results. She stated she would tell him. [facility]

## 2023-05-31 ENCOUNTER — TELEPHONE (OUTPATIENT)
Dept: HEMATOLOGY/ONCOLOGY | Facility: CLINIC | Age: 58
End: 2023-05-31
Payer: MEDICAID

## 2023-05-31 NOTE — TELEPHONE ENCOUNTER
Spoke to the patient who states he has had his pet scan done. I called the Pet imaging facility who states that the facility does not have an order or results. TTRN

## 2023-06-01 DIAGNOSIS — Z12.11 SCREENING FOR MALIGNANT NEOPLASM OF COLON: ICD-10-CM

## 2023-06-01 DIAGNOSIS — Z86.010 HISTORY OF COLON POLYPS: Primary | ICD-10-CM

## 2023-06-01 NOTE — PROGRESS NOTES
"[facility]  [address] [place] [address]  Phone: [phone]  Fax: [phone]    History & Physical         Provider: Dr. Glo Delgado    Patient Name: Chucho MOE (age):1965  58 y.o.           Gender: male   Phone: [phone]     Referring Physician: Sal Sewell     Vital Signs:   Height - 5' 8"  Weight - 192 lb  BMI -  29.28    Plan: Colonoscopy    Encounter Diagnoses   Name Primary?    History of colon polyps Yes    Screening for malignant neoplasm of colon            History:      Past Medical History:   Diagnosis Date    Allergy     Anxiety     Bladder cancer 2021    Coronary artery disease     Depression     Essential (primary) hypertension     GERD (gastroesophageal reflux disease)     Hypercholesterolemia     Paranoid schizophrenia     Type II diabetes mellitus     Urinary tract infection       Past Surgical History:   Procedure Laterality Date    ANKLE SURGERY      BLADDER TUMOR EXCISION      2X 2021/ Dec 2022    CORONARY ANGIOPLASTY WITH STENT PLACEMENT      CYSTOSCOPY      FOOT FRACTURE SURGERY      HAND SURGERY Bilateral     TURBT (TRANSURETHRAL RESECTION OF BLADDER TUMOR)  2023    WRIST SURGERY        Medication List with Changes/Refills   Current Medications    CLONAZEPAM (KLONOPIN) 1 MG TABLET        EZETIMIBE (ZETIA) 10 MG TABLET        HYDROCODONE-ACETAMINOPHEN (NORCO)  MG PER TABLET    Take 1 tablet by mouth every 6 (six) hours as needed for Pain.    HYDROXYZINE HCL (ATARAX) 25 MG TABLET        LISINOPRIL 10 MG TABLET        METFORMIN (GLUCOPHAGE) 500 MG TABLET        METOPROLOL TARTRATE (LOPRESSOR) 25 MG TABLET        PANTOPRAZOLE (PROTONIX) 40 MG TABLET        PRASUGREL (EFFIENT) 10 MG TAB    Take 1 tablet by mouth once daily.    ROSUVASTATIN (CRESTOR) 20 MG TABLET        SILDENAFIL (VIAGRA) 100 MG TABLET    Take 1 tablet (100 mg total) by mouth " daily as needed for Erectile Dysfunction.    TRULICITY 0.75 MG/0.5 ML PEN INJECTOR        VENLAFAXINE (EFFEXOR-XR) 150 MG CP24          Review of patient's allergies indicates:   Allergen Reactions    Iodine and iodide containing products       Family History   Problem Relation Age of Onset    Coronary artery disease Mother     No Known Problems Father     No Known Problems Sister     No Known Problems Sister     No Known Problems Sister     No Known Problems Sister     No Known Problems Sister     Coronary artery disease Brother     Coronary artery disease Brother     Cancer Maternal Grandmother     Skin cancer Maternal Grandmother     Cancer Maternal Aunt     No Known Problems Maternal Uncle     No Known Problems Maternal Uncle     No Known Problems Maternal Uncle     No Known Problems Maternal Uncle     No Known Problems Maternal Uncle     No Known Problems Maternal Uncle     No Known Problems Maternal Uncle     Colon cancer Neg Hx     Colon polyps Neg Hx     Crohn's disease Neg Hx     Liver disease Neg Hx       Social History     Tobacco Use    Smoking status: Every Day     Packs/day: 0.50     Years: 44.00     Pack years: 22.00     Types: Cigarettes     Start date: 1977     Last attempt to quit: 2020     Years since quittin.4    Smokeless tobacco: Never   Substance Use Topics    Alcohol use: Not Currently    Drug use: Never        Physical Examination:     General Appearance:___________________________  HEENT: _____________________________________  Abdomen:____________________________________  Heart:________________________________________  Lungs:_______________________________________  Extremities:___________________________________  Skin:_________________________________________  Endocrine:____________________________________  Genitourinary:_________________________________  Neurological:__________________________________      Patient has been evaluated immediately prior to sedation and is  medically cleared for endoscopy with IVCS as an ASA class: ______      Physician Signature: _________________________       Date: ________  Time: ________

## 2023-06-06 ENCOUNTER — TELEPHONE (OUTPATIENT)
Dept: GASTROENTEROLOGY | Facility: CLINIC | Age: 58
End: 2023-06-06

## 2023-06-06 NOTE — TELEPHONE ENCOUNTER
The patient told the GI lab that he did not prep the is not having his GI procedure.  He has canceled or did not arrive to his schedule GI procedure several times.  He is aware he can contact our office to reschedule if he wishes to do so.  However, he has recently been diagnosed with metastatic bladder cancer and remains persistently/inconsistently noncompliant with followups in therapies with oncology and radiation therapy.  NBP

## 2023-06-12 ENCOUNTER — TELEPHONE (OUTPATIENT)
Dept: HEMATOLOGY/ONCOLOGY | Facility: CLINIC | Age: 58
End: 2023-06-12
Payer: MEDICAID

## 2023-06-15 ENCOUNTER — TELEPHONE (OUTPATIENT)
Dept: UROLOGY | Facility: CLINIC | Age: 58
End: 2023-06-15
Payer: MEDICAID

## 2023-06-15 NOTE — TELEPHONE ENCOUNTER
Contacted pt, he's requesting labs. Advised pt that the labs/imaging the he is calling about was not ordered by EHR. Advised pt that he needs to contact Dr. Nelson. Pt verbalized understanding. BJP     ----- Message from Maddie Aguilera sent at 6/15/2023  4:07 PM CDT -----  Contact: self  Type:  Patient Returning Call    Who Called:Chucho Mack   Who Left Message for Patient: Mar  Does the patient know what this is regarding?: Yes  Would the patient rather a call back or a response via [facility]? Callback   Best Call Back Number:[phone]   Additional Information: I tried to call the clinic, but no answer. Please try all #'s on file - thanks!

## 2023-06-15 NOTE — TELEPHONE ENCOUNTER
Attempted to contact pt, no answer no vm setup. BJ    ----- Message from Jaymie Montero sent at 6/15/2023  2:24 PM CDT -----  Patient is calling in regards to results of lab results..Please call him back [phone]

## 2023-06-16 ENCOUNTER — TELEPHONE (OUTPATIENT)
Dept: HEMATOLOGY/ONCOLOGY | Facility: CLINIC | Age: 58
End: 2023-06-16
Payer: MEDICAID

## 2023-06-16 NOTE — TELEPHONE ENCOUNTER
Spoke with [facility] and pet scan results are still pending at this time. The lady I spoke with from pet imaging stated they should be in by Monday June 19. Called pt and stated results are pending and he voiced understanding.

## 2023-06-16 NOTE — TELEPHONE ENCOUNTER
----- Message from Lissa Johnson sent at 6/16/2023  9:55 AM CDT -----  Type:  Needs Medical Advice    Who Called: Chucho Mack  Symptoms (please be specific): -   How long has patient had these symptoms:  -  Pharmacy name and phone #:  -  Would the patient rather a call back or a response via [facility]?   Best Call Back Number: [phone]    Additional Information: pt wants to know if the Pet Scan results have come in

## 2023-06-20 LAB
ALBUMIN SERPL BCP-MCNC: 3.3 G/DL (ref 3.4–5)
ALBUMIN/GLOBULIN RATIO: 0.69 RATIO (ref 1.1–1.8)
ALP SERPL-CCNC: 78 U/L (ref 46–116)
ALT SERPL W P-5'-P-CCNC: 23 U/L (ref 12–78)
ANION GAP SERPL CALC-SCNC: 8 MMOL/L (ref 3–11)
AST SERPL-CCNC: 22 U/L (ref 15–37)
BASOPHILS NFR BLD: 0.1 % (ref 0–3)
BILIRUB SERPL-MCNC: 0.5 MG/DL (ref 0–1)
BUN SERPL-MCNC: 10 MG/DL (ref 7–18)
BUN/CREAT SERPL: 8.06 RATIO (ref 7–18)
CALCIUM SERPL-MCNC: 8.8 MG/DL (ref 8.8–10.5)
CHLORIDE SERPL-SCNC: 101 MMOL/L (ref 100–108)
CO2 SERPL-SCNC: 30 MMOL/L (ref 21–32)
CREAT SERPL-MCNC: 1.24 MG/DL (ref 0.7–1.3)
EOSINOPHIL NFR BLD: 1.2 % (ref 1–3)
ERYTHROCYTE [DISTWIDTH] IN BLOOD BY AUTOMATED COUNT: 14.2 % (ref 12.5–18)
GFR ESTIMATION: 60
GLOBULIN: 4.8 G/DL (ref 2.3–3.5)
GLUCOSE SERPL-MCNC: 100 MG/DL (ref 70–110)
HCT VFR BLD AUTO: 44.9 % (ref 42–52)
HGB BLD-MCNC: 15.2 G/DL (ref 14–18)
LYMPHOCYTES NFR BLD: 20.6 % (ref 25–40)
MCH RBC QN AUTO: 31.5 PG (ref 27–31.2)
MCHC RBC AUTO-ENTMCNC: 33.9 G/DL (ref 31.8–35.4)
MCV RBC AUTO: 93.2 FL (ref 80–97)
MONOCYTES NFR BLD: 8.6 % (ref 1–15)
NEUTROPHILS # BLD AUTO: 5.4 10*3/UL (ref 1.8–7.7)
NEUTROPHILS NFR BLD: 69 % (ref 37–80)
NUCLEATED RED BLOOD CELLS: 0 %
PLATELETS: 256 10*3/UL (ref 142–424)
POTASSIUM SERPL-SCNC: 4.2 MMOL/L (ref 3.6–5.2)
PROT SERPL-MCNC: 8.1 G/DL (ref 6.4–8.2)
RBC # BLD AUTO: 4.82 10*6/UL (ref 4.7–6.1)
SODIUM BLD-SCNC: 139 MMOL/L (ref 135–145)
WBC # BLD: 7.8 10*3/UL (ref 4.6–10.2)

## 2023-06-21 ENCOUNTER — TELEPHONE (OUTPATIENT)
Dept: HEMATOLOGY/ONCOLOGY | Facility: CLINIC | Age: 58
End: 2023-06-21

## 2023-06-21 ENCOUNTER — OFFICE VISIT (OUTPATIENT)
Dept: HEMATOLOGY/ONCOLOGY | Facility: CLINIC | Age: 58
End: 2023-06-21
Payer: MEDICAID

## 2023-06-21 VITALS
SYSTOLIC BLOOD PRESSURE: 170 MMHG | HEIGHT: 68 IN | BODY MASS INDEX: 28.51 KG/M2 | WEIGHT: 188.13 LBS | RESPIRATION RATE: 16 BRPM | OXYGEN SATURATION: 97 % | DIASTOLIC BLOOD PRESSURE: 103 MMHG | HEART RATE: 104 BPM

## 2023-06-21 DIAGNOSIS — C67.8 MALIGNANT NEOPLASM OF OVERLAPPING SITES OF BLADDER: Primary | ICD-10-CM

## 2023-06-21 PROCEDURE — 3008F PR BODY MASS INDEX (BMI) DOCUMENTED: ICD-10-PCS | Mod: CPTII,S$GLB,, | Performed by: INTERNAL MEDICINE

## 2023-06-21 PROCEDURE — 3080F DIAST BP >= 90 MM HG: CPT | Mod: CPTII,S$GLB,, | Performed by: INTERNAL MEDICINE

## 2023-06-21 PROCEDURE — 99215 OFFICE O/P EST HI 40 MIN: CPT | Mod: S$GLB,,, | Performed by: INTERNAL MEDICINE

## 2023-06-21 PROCEDURE — 3008F BODY MASS INDEX DOCD: CPT | Mod: CPTII,S$GLB,, | Performed by: INTERNAL MEDICINE

## 2023-06-21 PROCEDURE — 1159F MED LIST DOCD IN RCRD: CPT | Mod: CPTII,S$GLB,, | Performed by: INTERNAL MEDICINE

## 2023-06-21 PROCEDURE — 1159F PR MEDICATION LIST DOCUMENTED IN MEDICAL RECORD: ICD-10-PCS | Mod: CPTII,S$GLB,, | Performed by: INTERNAL MEDICINE

## 2023-06-21 PROCEDURE — 3077F PR MOST RECENT SYSTOLIC BLOOD PRESSURE >= 140 MM HG: ICD-10-PCS | Mod: CPTII,S$GLB,, | Performed by: INTERNAL MEDICINE

## 2023-06-21 PROCEDURE — 3077F SYST BP >= 140 MM HG: CPT | Mod: CPTII,S$GLB,, | Performed by: INTERNAL MEDICINE

## 2023-06-21 PROCEDURE — 3080F PR MOST RECENT DIASTOLIC BLOOD PRESSURE >= 90 MM HG: ICD-10-PCS | Mod: CPTII,S$GLB,, | Performed by: INTERNAL MEDICINE

## 2023-06-21 PROCEDURE — 99215 PR OFFICE/OUTPT VISIT, EST, LEVL V, 40-54 MIN: ICD-10-PCS | Mod: S$GLB,,, | Performed by: INTERNAL MEDICINE

## 2023-06-21 NOTE — PROGRESS NOTES
MEDICAL ONCOLOGY FOLLOW UP  NOTE    Patient ID: Chucho Mack is a 58 y.o. male.    Chief Complaint: Bladder cancer    HPI:  Patient is a 55-year-old male with past medical history of anxiety, gastroesophageal reflux disease, hyperlipidemia, paranoid schizophrenia who  recently underwent transurethral resection of large bladder tumor > 8 cm by Urology and pathology was conclusive of bladder cancer.  Please see pathology report below. He presents to medical oncology clinic today for further evaluation. Reports hematuria post TURBT but doing better since then.    BLADDER TUMOR, TRANSURETHRAL RESECTION: 12/23/2020    - ADENOCARCINOMA, INVASIVE, MODERATELY DIFFERENTIATED.   - TUMOR INVADES MUSCULARIS PROPRIA.   - NEGATIVE FOR LYMPHOVASCULAR INVASION.   - SEE COMMENT.   Comment:   To further evaluate the specimen, the following immunohistochemical stains   were performed by [facility] laboratory, on block 1H with   adequate controls and with interpretation as indicated:      CK7:      negative 0030 S>     CK20:      positive      CDX2:      positive      p63:      negative   Primary adenocarcinomas of the bladder  may show significant morphologic and   immunohistochemical overlap with adenocarcinomas from other primary sites.   In this instance, the tumor shows striking morphologic and   immunohistochemical similarity to primary colorectal adenocarcinoma.   Careful correlation with endoscopic and radiographic findings is necessary   for determination of the definitive primary site.     Imaging:      CT Abdomen and pelvis: 10/7/2020    - Partially calcified soft tissue intraluminal lesion of the urinary bladder.  Malignancy least differential.  - heterogeneously enhancing complex 2.2 cm lesion of the right kidney.  - Cyst of the bilateral kidneys.  - hepatic steatosis with focal fatty sparing.  - colonic diverticular disease    CT chest w/o contrast: 1/25/2021  1.  Fatty infiltration of the liver.        2.   No other acute pathology    CT scan A/P: 5/13/2021    - The bladder mass is increased in size compared with CT from August 2020.  Is much carlos of the adjacent aurora-serosal fat may reflect invasion.  - there are several bilateral renal hypodensities, most of which are too small to characterize in terms of attenuation.  This is stable compared with prior exam.  The largest is a cyst in the left lower pole.  One that is indeterminate in the mid to lower pole the right kidney is probably large enough to characterize a cystic or solid by ultrasound.  Consider ultrasound of this lesion is not been evaluated previously.    Labs:  Lab Results   Component Value Date    WBC 7.8 06/20/2023    HGB 15.2 06/20/2023    HCT 44.9 06/20/2023    MCV 93.2 06/20/2023    LABPLAT 256 06/20/2023      Platelets   Date Value Ref Range Status   06/20/2023 256 142 - 424 10*3/uL Final     CMP  Sodium   Date Value Ref Range Status   06/20/2023 139 135 - 145 mmol/L Final     Potassium   Date Value Ref Range Status   06/20/2023 4.2 3.6 - 5.2 mmol/L Final     Chloride   Date Value Ref Range Status   06/20/2023 101 100 - 108 mmol/L Final     CO2   Date Value Ref Range Status   06/20/2023 30 21 - 32 mmol/L Final     Glucose   Date Value Ref Range Status   06/20/2023 100 70 - 110 mg/dL Final     BUN   Date Value Ref Range Status   06/20/2023 10 7 - 18 mg/dL Final     Creatinine   Date Value Ref Range Status   06/20/2023 1.24 0.70 - 1.30 mg/dL Final     Calcium   Date Value Ref Range Status   06/20/2023 8.8 8.8 - 10.5 mg/dL Final     Total Protein   Date Value Ref Range Status   06/20/2023 8.1 6.4 - 8.2 g/dL Final     Albumin   Date Value Ref Range Status   06/20/2023 3.3 (L) 3.4 - 5.0 g/dL Final     Total Bilirubin   Date Value Ref Range Status   06/20/2023 0.5 0.0 - 1.0 mg/dL Final     Alkaline Phosphatase   Date Value Ref Range Status   06/20/2023 78 46 - 116 U/L Final     AST   Date Value Ref Range Status   06/20/2023 22 15 - 37 U/L Final      ALT   Date Value Ref Range Status   2023 23 12 - 78 U/L Final     Anion Gap   Date Value Ref Range Status   2023 8.0 3.0 - 11.0 mmol/L Final            Past Medical History:   Diagnosis Date    Allergy     Anxiety     Bladder cancer 2021    Coronary artery disease     Depression     Essential (primary) hypertension     GERD (gastroesophageal reflux disease)     Hypercholesterolemia     Paranoid schizophrenia     Type II diabetes mellitus     Urinary tract infection      Family History   Problem Relation Age of Onset    Coronary artery disease Mother     No Known Problems Father     No Known Problems Sister     No Known Problems Sister     No Known Problems Sister     No Known Problems Sister     No Known Problems Sister     Coronary artery disease Brother     Coronary artery disease Brother     Cancer Maternal Grandmother     Skin cancer Maternal Grandmother     Cancer Maternal Aunt     No Known Problems Maternal Uncle     No Known Problems Maternal Uncle     No Known Problems Maternal Uncle     No Known Problems Maternal Uncle     No Known Problems Maternal Uncle     No Known Problems Maternal Uncle     No Known Problems Maternal Uncle     Colon cancer Neg Hx     Colon polyps Neg Hx     Crohn's disease Neg Hx     Liver disease Neg Hx      Social History     Socioeconomic History    Marital status:    Tobacco Use    Smoking status: Every Day     Packs/day: 0.50     Years: 44.00     Pack years: 22.00     Types: Cigarettes     Start date: 1977     Last attempt to quit: 2020     Years since quittin.4    Smokeless tobacco: Never   Substance and Sexual Activity    Alcohol use: Not Currently    Drug use: Never     Past Surgical History:   Procedure Laterality Date    ANKLE SURGERY      BLADDER TUMOR EXCISION      2X 2021/ Dec 2022    CORONARY ANGIOPLASTY WITH STENT PLACEMENT      CYSTOSCOPY      FOOT FRACTURE SURGERY      HAND SURGERY Bilateral     TURBT (TRANSURETHRAL RESECTION  OF BLADDER TUMOR)  05/01/2023    WRIST SURGERY           Review of systems:  Review of Systems   Constitutional:  Negative for activity change, appetite change, chills, diaphoresis, fatigue and unexpected weight change.   HENT:  Negative for congestion, facial swelling, hearing loss, mouth sores, trouble swallowing and voice change.    Eyes:  Negative for photophobia, pain, discharge and itching.   Respiratory:  Negative for apnea, cough, choking, chest tightness and shortness of breath.    Cardiovascular:  Negative for chest pain, palpitations and leg swelling.   Gastrointestinal:  Negative for abdominal distention, abdominal pain, anal bleeding and blood in stool.   Endocrine: Negative for cold intolerance, heat intolerance, polydipsia and polyphagia.   Genitourinary:  Positive for hematuria. Negative for difficulty urinating, dysuria and flank pain.   Musculoskeletal:  Negative for arthralgias, back pain, joint swelling, myalgias, neck pain and neck stiffness.   Skin:  Negative for color change, pallor and wound.   Allergic/Immunologic: Negative for environmental allergies, food allergies and immunocompromised state.   Neurological:  Negative for dizziness, seizures, facial asymmetry, speech difficulty, light-headedness, numbness and headaches.   Hematological:  Negative for adenopathy. Does not bruise/bleed easily.   Psychiatric/Behavioral:  Negative for agitation, behavioral problems, confusion, decreased concentration and sleep disturbance.            Physical Exam  Vitals and nursing note reviewed.   Constitutional:       General: He is not in acute distress.     Appearance: Normal appearance. He is not ill-appearing.   HENT:      Head: Normocephalic and atraumatic.      Nose: No congestion or rhinorrhea.   Eyes:      General: No scleral icterus.     Extraocular Movements: Extraocular movements intact.      Pupils: Pupils are equal, round, and reactive to light.   Cardiovascular:      Rate and Rhythm: Normal  rate and regular rhythm.      Pulses: Normal pulses.      Heart sounds: Normal heart sounds. No murmur heard.    No gallop.   Pulmonary:      Effort: Pulmonary effort is normal. No respiratory distress.      Breath sounds: Normal breath sounds. No stridor. No wheezing or rhonchi.   Abdominal:      General: Bowel sounds are normal. There is no distension.      Palpations: There is no mass.      Tenderness: There is no abdominal tenderness. There is no guarding.   Musculoskeletal:         General: No swelling, tenderness, deformity or signs of injury. Normal range of motion.      Cervical back: Normal range of motion and neck supple. No rigidity. No muscular tenderness.      Right lower leg: No edema.      Left lower leg: No edema.   Skin:     General: Skin is warm.      Coloration: Skin is not jaundiced or pale.      Findings: No bruising or lesion.   Neurological:      General: No focal deficit present.      Mental Status: He is alert and oriented to person, place, and time.      Cranial Nerves: No cranial nerve deficit.      Sensory: No sensory deficit.      Motor: No weakness.      Gait: Gait normal.   Psychiatric:         Mood and Affect: Mood normal.         Behavior: Behavior normal.         Thought Content: Thought content normal.      Vitals:    06/21/23 1314   BP: (!) 170/103   Pulse: 104   Resp: 16     Body surface area is 2.02 meters squared.    Assessment/Plan:      ECOG 1    1. Invasive moderately differentiated muscle invasive bladder tumor s/p TURBT 12/21/2020: Stage IIIA at presentation now with Stage IV  -- Prior CT scan done 08/2020 showed no evidence of lymphadenopathy. However based on the pathology report, we could be looking at primary colo-rectal adenocarcinoma with metastasis.  CT scan chest reviewed, so far no signs of distant metastatic disease.  -- Denies any bone pain so do not need any bone imaging as clinical suspicion for bone mets low unless  Alkaline phosphatase elevated.   --  Discussed with patient further management options for muscle invasive bladder cancer s/p TURBT and the pathology report showing findings with a possible bladder cancer vs colo-rectal cancer primary. If not deemed bladder cancer than no specific role of radiation and will be treated with as metastatic colon cancer.  -- Discussed in TMB meeting 1/21/2020 and consensus decision to obtain C-scopy and if no mass evident on C-scopy, pathology would send for further testing/ ? Second opinion. Referral to GI was placed and he is scheduled to get C-scopy 1/26/2020. C-scopy did not reveal any suspicious lesions which patient delayed getting C-scopy for a long time    -- 3/11/2021: Patient did not present back to clinic until today after his last visit, end of Jan 2021. He also missed his GI appointment which was scheduled urgently at the time. He reports going through family stressors. I discussed with him and counseled about his non-compliance in coming to appointments. Will obtain restaging scans and will await his C-scopy results. Again counseled not to miss appointments.    -- 5/25/2021:  Patient presents to clinic after a delay of more than 2 months and again does not seem to have a valid reason for causing delay in his own care.  He reports having a recent visit to ER where he was noted to have a urinary tract infection and he also underwent CT scan A/P which just showed increase in size of the bladder mass to approximately 5-6 cm from 4 cm.  I discussed with patient in detail about future management options and also pathology confirming this to be a bladder tumor with pure enteric colonic morphology but no GI primary.  I understand this is a very unreliable patient and might not show up for chemotherapy, hence with the support of our staff we will  involve his family.  == Patient reports that he does not have the resources to undergo any type of radical surgery including a radical cystectomy as he already have  "transport issues even coming to clinic here and would be unable to go anywhere outside the city.  He would like to pursue with bladder preservation with concurrent chemoradiotherapy. I discussed with him that trimodality therapy (TMT) incorporating maximal TURBT followed by radiation therapy (RT) with concurrent radiosensitizing chemotherapy can be a comparably effective regimen to preserve a functioning bladder in well-selected patients with muscle-invasive bladder cancer.   == Will use 5FU Mitomycin with XRT. He is scheduled to see Rad-onc tomorrow and will need port/IV access prior to starting chemotherapy, pt is unable to get PORT, alternative plan is to get PICC line on Friday and proceed with Gemzar/cisplatin.     --6/18/21: s/p C1D1 &D8 gem/cis with XRT maegan well, mild nausea noted but controlled with prescribed oral antiemetics. Pt has noted less hematuria and feels overall "good"   --7/6/21:  Patient is one-week delay for cycle 2 due to transportation difficulties last week.  Patient did maintain most of daily radiation though.  Discussed with patient importance of maintaining chemotherapy appointments and will begin doing weekly labs with Kourtney at [facility].  Patient states feeling pretty good no real side effects current leaf from radiation or chemotherapy to complain of.  Plan is to proceed with cycle 2 day 1 on Thursday.  Return to infusion next Thursday for cycle 2 day 8 and return to clinic in 3 weeks prior to cycle 3  --7/29/21: pt here today to begin cycle 3. He is to complete XRT next week. So far tolerating tx very well. Labs reviewed and no new c/o.   --8/10/21: s/p c1d8, completed xrt this week, doing well, labs reviewed and pt cleared for chemo today.   --9/8/21: pt here in clinic today after 3 week absence due to mother passing. He missed his 4 th of chemo but would like to resume his chemo next week. Labs today reviewed and infusion is notified to call him to schedule C4D1 next week. " Pt requesting pain medication refill.   Scans to be scheduled after cycle 4 is completed.   --9/29/21: pt here today after not completing cycle 4 or doing ct scans. Counseled patient regarding need for repeat imaging for active surveillance of disease as well as tapering narcotic pain medication now that he has completed chemo/xrt. Today percocet is changed to Norco 5/325mg #30 and pt understands that this will be his final narcotic prescription provided. Orders placed today for PICC to be pulled at infusion.  He is to RTC in 3 weeks with CT scans.   ==4/26/23:  Patient presenting to this clinic after a gap of  2 years.  He reports several logistic issues for missing his appointments.  More recently he was seen by Urology for recurrent bladder cancer and plan for TURBT was made.  However it does not show patient has followed back with Urology.  He also reports getting CT scan done at an outside hospital but I do not have any reports available at this time.  I discussed with him about his several no-shows over the last 2 years and the need to look at dose restaging scan before we decide on our next steps.  I strongly encouraged him to keep his upcoming TURBT appointment with Urology and also showed him the note from urology team where they have attempted to contact him.  == 5/24/23:  Patient underwent repeat TURBT and per Urology note he had multiple recurrences in the bladder which were found to be invasive adenocarcinoma high-grade.  Please see the discussion above where patient has repeatedly missed chemo/radiation appointments in the past and more recently presented after a gap of 2 years.  Patient was seen on 05/11/2023 by Dr. Bro post TURBT and he was referred back to Oncology as he had persistent disease after treatment.  Patient was also discussed in tumor board in I am unsure how we can reliably give any kind of treatment to a patient who does not show up persistently.  I will order a PET scan for  restaging and to evaluate the lung nodules noted on recent CT scan.  For a patient who was repeatedly noncompliant and does not show up and disappears during chemotherapy I do not feel I have much to offer at this time.  If he is confirmed stage IV based on PET scan results then palliative gemcitabine and carboplatin followed by avelumab maintenance therapy is a consideration.  PET scan has been ordered for restaging. Also depending on his stage and symptoms-- palliative XRT could have a role but he has had multiple no shows with Rad-onc in the past as well.  == 6/21/23:  PET scan for restaging showed a 2.8 x 5.2 cm mass in the anterior aspect of urinary bladder cavity with foci of amorphous calcification.  Bilateral few scattered lung nodules with increased FDG activity consistent with metastasis.  Soft tissue fullness with increased FDG activity of left oropharynx.  Patient is status post palliative localized re-irradiation secondary to urinary obstructive symptoms and bleeding.  Discussed with patient in detail future management options for palliative chemo immunotherapy for patients who are cis-platinum eligible and will send prior authorization request for gemcitabine and carboplatin him to be followed by avelumab maintenance therapy.  Will also send Temp[employer] testing at this time      2. CAD:    -- S/p stent placement 03/2021 and follows cardiology  -- Stable    Plan:  Port Placement: Dr Donald  IR referral to to biopsy any of the  lung lesion to confirm metastasis  PA for Carboplatin/ Gemcitabine followed by avelumab maintainence  Tempus testing    RTC in 2 weeks for MD visit with CBC, CMP prior    Total time spent in counseling and discussion was more than 60 minutes. I discussed in detail further management options at this time and discussion on relevant tests, imaging and ancillary tests as part of the management plan.

## 2023-06-28 ENCOUNTER — OFFICE VISIT (OUTPATIENT)
Dept: SURGERY | Facility: CLINIC | Age: 58
End: 2023-06-28
Payer: MEDICAID

## 2023-06-28 DIAGNOSIS — C67.8 MALIGNANT NEOPLASM OF OVERLAPPING SITES OF BLADDER: Primary | ICD-10-CM

## 2023-06-28 LAB
ANION GAP SERPL CALC-SCNC: 6 MMOL/L (ref 3–11)
BASOPHILS NFR BLD: 0.3 % (ref 0–3)
BUN SERPL-MCNC: 9 MG/DL (ref 7–18)
BUN/CREAT SERPL: 8.18 RATIO (ref 7–18)
CALCIUM SERPL-MCNC: 9.7 MG/DL (ref 8.8–10.5)
CHLORIDE SERPL-SCNC: 101 MMOL/L (ref 100–108)
CO2 SERPL-SCNC: 31 MMOL/L (ref 21–32)
CREAT SERPL-MCNC: 1.1 MG/DL (ref 0.7–1.3)
EOSINOPHIL NFR BLD: 1.4 % (ref 1–3)
ERYTHROCYTE [DISTWIDTH] IN BLOOD BY AUTOMATED COUNT: 14.3 % (ref 12.5–18)
GFR ESTIMATION: > 60
GLUCOSE SERPL-MCNC: 91 MG/DL (ref 70–110)
HCT VFR BLD AUTO: 44.6 % (ref 42–52)
HGB BLD-MCNC: 14.9 G/DL (ref 14–18)
LYMPHOCYTES NFR BLD: 21.9 % (ref 25–40)
MCH RBC QN AUTO: 31.4 PG (ref 27–31.2)
MCHC RBC AUTO-ENTMCNC: 33.4 G/DL (ref 31.8–35.4)
MCV RBC AUTO: 93.9 FL (ref 80–97)
MONOCYTES NFR BLD: 8.2 % (ref 1–15)
NEUTROPHILS # BLD AUTO: 4.36 10*3/UL (ref 1.8–7.7)
NEUTROPHILS NFR BLD: 67.7 % (ref 37–80)
NUCLEATED RED BLOOD CELLS: 0 %
PLATELETS: 282 10*3/UL (ref 142–424)
POTASSIUM SERPL-SCNC: 4.2 MMOL/L (ref 3.6–5.2)
RBC # BLD AUTO: 4.75 10*6/UL (ref 4.7–6.1)
SODIUM BLD-SCNC: 138 MMOL/L (ref 135–145)
WBC # BLD: 6.4 10*3/UL (ref 4.6–10.2)

## 2023-06-28 PROCEDURE — 99213 OFFICE O/P EST LOW 20 MIN: CPT | Mod: S$GLB,,, | Performed by: SURGERY

## 2023-06-28 PROCEDURE — 1159F MED LIST DOCD IN RCRD: CPT | Mod: CPTII,S$GLB,, | Performed by: SURGERY

## 2023-06-28 PROCEDURE — 1160F RVW MEDS BY RX/DR IN RCRD: CPT | Mod: CPTII,S$GLB,, | Performed by: SURGERY

## 2023-06-28 PROCEDURE — 1159F PR MEDICATION LIST DOCUMENTED IN MEDICAL RECORD: ICD-10-PCS | Mod: CPTII,S$GLB,, | Performed by: SURGERY

## 2023-06-28 PROCEDURE — 99213 PR OFFICE/OUTPT VISIT, EST, LEVL III, 20-29 MIN: ICD-10-PCS | Mod: S$GLB,,, | Performed by: SURGERY

## 2023-06-28 PROCEDURE — 1160F PR REVIEW ALL MEDS BY PRESCRIBER/CLIN PHARMACIST DOCUMENTED: ICD-10-PCS | Mod: CPTII,S$GLB,, | Performed by: SURGERY

## 2023-06-28 NOTE — PROGRESS NOTES
History & Physical    SUBJECTIVE:     History of Present Illness:    58-year-old male referred by Medical Oncology for venous access port placement.  Recent diagnosis of bladder adenocarcinoma and will be starting chemotherapy.    Chief Complaint   Patient presents with    Other     Port placement          Review of patient's allergies indicates:  Review of patient's allergies indicates:   Allergen Reactions    Iodine and iodide containing products        Current Outpatient Medications on File Prior to Visit   Medication Sig Dispense Refill    clonazePAM (KLONOPIN) 1 MG tablet       ezetimibe (ZETIA) 10 mg tablet       HYDROcodone-acetaminophen (NORCO)  mg per tablet Take 1 tablet by mouth every 6 (six) hours as needed for Pain. 10 tablet 0    hydrOXYzine HCL (ATARAX) 25 MG tablet       lisinopriL 10 MG tablet       metFORMIN (GLUCOPHAGE) 500 MG tablet       metoprolol tartrate (LOPRESSOR) 25 MG tablet       pantoprazole (PROTONIX) 40 MG tablet       prasugreL (EFFIENT) 10 mg Tab Take 1 tablet by mouth once daily.      rosuvastatin (CRESTOR) 20 MG tablet       sildenafiL (VIAGRA) 100 MG tablet Take 1 tablet (100 mg total) by mouth daily as needed for Erectile Dysfunction. 30 tablet 11    TRULICITY 0.75 mg/0.5 mL pen injector       venlafaxine (EFFEXOR-XR) 150 MG Cp24        No current facility-administered medications on file prior to visit.       Past Medical History:   Diagnosis Date    Allergy     Anxiety     Bladder cancer 01/25/2021    Coronary artery disease     Depression     Essential (primary) hypertension     GERD (gastroesophageal reflux disease)     Hypercholesterolemia     Paranoid schizophrenia     Type II diabetes mellitus     Urinary tract infection      Past Surgical History:   Procedure Laterality Date    ANKLE SURGERY      BLADDER TUMOR EXCISION      2X 2021/ Dec 2022    CORONARY ANGIOPLASTY WITH STENT PLACEMENT      CYSTOSCOPY      FOOT FRACTURE SURGERY      HAND SURGERY Bilateral     TURBT  (TRANSURETHRAL RESECTION OF BLADDER TUMOR)  2023    WRIST SURGERY       Family History   Problem Relation Age of Onset    Coronary artery disease Mother     No Known Problems Father     No Known Problems Sister     No Known Problems Sister     No Known Problems Sister     No Known Problems Sister     No Known Problems Sister     Coronary artery disease Brother     Coronary artery disease Brother     Cancer Maternal Grandmother     Skin cancer Maternal Grandmother     Cancer Maternal Aunt     No Known Problems Maternal Uncle     No Known Problems Maternal Uncle     No Known Problems Maternal Uncle     No Known Problems Maternal Uncle     No Known Problems Maternal Uncle     No Known Problems Maternal Uncle     No Known Problems Maternal Uncle     Colon cancer Neg Hx     Colon polyps Neg Hx     Crohn's disease Neg Hx     Liver disease Neg Hx        Social History     Socioeconomic History    Marital status:    Tobacco Use    Smoking status: Every Day     Packs/day: 0.50     Years: 44.00     Pack years: 22.00     Types: Cigarettes     Start date: 1977     Last attempt to quit: 2020     Years since quittin.5    Smokeless tobacco: Never   Substance and Sexual Activity    Alcohol use: Not Currently    Drug use: Never          Review of Systems   Constitutional: Negative.    Respiratory: Negative.     Cardiovascular: Negative.    Gastrointestinal: Negative.    Musculoskeletal: Negative.    Neurological: Negative.    Endo/Heme/Allergies: Negative.    Psychiatric/Behavioral: Negative.       OBJECTIVE:     There were no vitals filed for this visit.              Physical Exam:  Physical Exam  Constitutional:       Appearance: Normal appearance. He is normal weight.   HENT:      Head: Normocephalic.   Eyes:      Pupils: Pupils are equal, round, and reactive to light.   Cardiovascular:      Rate and Rhythm: Normal rate and regular rhythm.   Pulmonary:      Effort: Pulmonary effort is normal.       Breath sounds: Normal breath sounds.   Abdominal:      General: Abdomen is flat. Bowel sounds are normal.      Palpations: Abdomen is soft.   Musculoskeletal:         General: Normal range of motion.      Cervical back: Normal range of motion.   Skin:     General: Skin is warm and dry.   Neurological:      General: No focal deficit present.      Mental Status: He is alert and oriented to person, place, and time.      Cranial Nerves: No cranial nerve deficit.   Psychiatric:         Mood and Affect: Mood normal.         Behavior: Behavior normal.         Judgment: Judgment normal.           ASSESSMENT/PLAN:   Invasive bladder adenocarcinoma, beginning chemotherapy  PLAN:  Left subclavian venous access port placement will be scheduled for tomorrow the 29th of June.  Procedure, risk and benefits discussed with patient.

## 2023-06-29 ENCOUNTER — OUTSIDE PLACE OF SERVICE (OUTPATIENT)
Dept: SURGERY | Facility: CLINIC | Age: 58
End: 2023-06-29

## 2023-06-29 DIAGNOSIS — R91.1 SOLITARY PULMONARY NODULE: Primary | ICD-10-CM

## 2023-06-29 LAB
GLUCOSE SERPL-MCNC: 88 MG/DL (ref 70–105)
GLUCOSE SERPL-MCNC: 99 MG/DL (ref 70–105)

## 2023-06-29 PROCEDURE — 77001 FLUOROGUIDE FOR VEIN DEVICE: CPT | Mod: 26,,, | Performed by: SURGERY

## 2023-06-29 PROCEDURE — 77001 CHG FLUOROGUIDE CNTRL VEN ACCESS,PLACE,REPLACE,REMOVE: ICD-10-PCS | Mod: 26,,, | Performed by: SURGERY

## 2023-06-29 PROCEDURE — 36561 INSERT TUNNELED CV CATH: CPT | Mod: LT,,, | Performed by: SURGERY

## 2023-06-29 PROCEDURE — 36561 PR INSERT TUNNELED CV CATH WITH PORT: ICD-10-PCS | Mod: LT,,, | Performed by: SURGERY

## 2023-07-03 DIAGNOSIS — C67.8 MALIGNANT NEOPLASM OF OVERLAPPING SITES OF BLADDER: Primary | ICD-10-CM

## 2023-07-05 ENCOUNTER — TELEPHONE (OUTPATIENT)
Dept: HEMATOLOGY/ONCOLOGY | Facility: CLINIC | Age: 58
End: 2023-07-05

## 2023-07-06 ENCOUNTER — CLINICAL SUPPORT (OUTPATIENT)
Dept: PULMONOLOGY | Facility: CLINIC | Age: 58
End: 2023-07-06
Payer: MEDICAID

## 2023-07-06 ENCOUNTER — OFFICE VISIT (OUTPATIENT)
Dept: PULMONOLOGY | Facility: CLINIC | Age: 58
End: 2023-07-06
Payer: MEDICAID

## 2023-07-06 VITALS
HEIGHT: 68 IN | HEART RATE: 81 BPM | BODY MASS INDEX: 28.19 KG/M2 | SYSTOLIC BLOOD PRESSURE: 156 MMHG | DIASTOLIC BLOOD PRESSURE: 107 MMHG | RESPIRATION RATE: 17 BRPM | OXYGEN SATURATION: 96 % | WEIGHT: 186 LBS

## 2023-07-06 DIAGNOSIS — R91.8 MULTIPLE LUNG NODULES: Primary | ICD-10-CM

## 2023-07-06 DIAGNOSIS — Z72.0 TOBACCO ABUSE: ICD-10-CM

## 2023-07-06 DIAGNOSIS — I25.10 CORONARY ARTERY DISEASE, UNSPECIFIED VESSEL OR LESION TYPE, UNSPECIFIED WHETHER ANGINA PRESENT, UNSPECIFIED WHETHER NATIVE OR TRANSPLANTED HEART: ICD-10-CM

## 2023-07-06 DIAGNOSIS — C67.9 STAGE IV BLADDER CANCER: ICD-10-CM

## 2023-07-06 DIAGNOSIS — Z71.6 TOBACCO ABUSE COUNSELING: ICD-10-CM

## 2023-07-06 DIAGNOSIS — K21.9 GASTROESOPHAGEAL REFLUX DISEASE WITHOUT ESOPHAGITIS: ICD-10-CM

## 2023-07-06 DIAGNOSIS — R91.1 LUNG NODULE: ICD-10-CM

## 2023-07-06 DIAGNOSIS — R91.1 LUNG NODULE: Primary | ICD-10-CM

## 2023-07-06 DIAGNOSIS — E78.5 HYPERLIPIDEMIA, UNSPECIFIED HYPERLIPIDEMIA TYPE: ICD-10-CM

## 2023-07-06 PROCEDURE — 4010F PR ACE/ARB THEARPY RXD/TAKEN: ICD-10-PCS | Mod: CPTII,S$GLB,, | Performed by: INTERNAL MEDICINE

## 2023-07-06 PROCEDURE — 4010F ACE/ARB THERAPY RXD/TAKEN: CPT | Mod: CPTII,S$GLB,, | Performed by: INTERNAL MEDICINE

## 2023-07-06 PROCEDURE — 99205 OFFICE O/P NEW HI 60 MIN: CPT | Mod: S$GLB,,, | Performed by: INTERNAL MEDICINE

## 2023-07-06 PROCEDURE — 99205 PR OFFICE/OUTPT VISIT, NEW, LEVL V, 60-74 MIN: ICD-10-PCS | Mod: S$GLB,,, | Performed by: INTERNAL MEDICINE

## 2023-07-06 NOTE — PROGRESS NOTES
Subjective:    Patient Identification:   Patient ID: Chucho Mack is a 58 y.o. male.    Referring Provider:  Dr. Brock    Chief Complaint:  Multiple lung nodules    History of Present Illness:    Chucho Mack is a 58 y.o. male who presents for the evaluation of above-mentioned problem.    Patient has medical history significant for coronary artery disease status post stent placement, not taking any antiplatelet agents at this moment, gastroesophageal reflux disease, hyperlipidemia, paranoid schizophrenia and bladder cancer status post transurethral resection by urology.  Path report was consistent with invasive moderately differentiated adenocarcinoma with tumor invading the muscularis propria and negative for lymphovascular invasion.  He was positive for CK20 and CDX 2.  It was thought to be a primary colorectal adenocarcinoma given morphology.  In January 2021 his CT chest did not show any acute abnormalities.  He had been following with Medical Oncology since then.  He underwent repeat TURBT with findings of multiple recurrences in the bladder which were found to be invasive adenocarcinoma of high-grade.  He underwent chemotherapy earlier and with recent findings on TURBT was referred back to Medical Oncology.  He had radiation therapy earlier and underwent palliative radiation therapy with Dr. Street again for obstructive uropathy and bleeding.  He had a port placed this week and is about to start palliative chemotherapy and immunotherapy awaiting further workup.  Recent CT chest and a PET scan showed up new nodules in lungs with increased FDG up take  He has been referred to me for helping with tissue diagnosis.  Patient does have history of tobacco abuse.  He has smoked 1-1/2 pack a day for about 40 years, currently down to 3 cigarettes a day.      Review of Systems:  Review of Systems   Constitutional:  Negative for fever, chills, weight loss, weight gain, activity change, appetite change, fatigue, night sweats  and weakness.   HENT:  Negative for nosebleeds, postnasal drip, rhinorrhea, sinus pressure, sore throat, trouble swallowing, voice change, congestion, ear pain and hearing loss.    Eyes:  Negative for redness and itching.   Respiratory:  Negative for cough, hemoptysis, sputum production, choking, chest tightness, shortness of breath, wheezing, orthopnea, previous hospitialization due to pulmonary problems, asthma nighttime symptoms, pleurisy, dyspnea on extertion, use of rescue inhaler and Paroxysmal Nocturnal Dyspnea.    Cardiovascular:  Negative for chest pain, palpitations and leg swelling.   Genitourinary:  Positive for hematuria. Negative for difficulty urinating.   Endocrine:  Negative for polydipsia, polyphagia, cold intolerance, heat intolerance and polyuria.    Musculoskeletal:  Negative for arthralgias, back pain, gait problem, joint swelling and myalgias.   Skin:  Negative for rash.   Gastrointestinal:  Negative for nausea, vomiting, abdominal pain, abdominal distention and acid reflux.   Neurological:  Negative for dizziness, syncope, weakness, light-headedness and headaches.   Hematological:  Negative for adenopathy. Does not bruise/bleed easily and no excessive bruising.   Psychiatric/Behavioral:  Negative for confusion and sleep disturbance. The patient is not nervous/anxious.        Allergies:   Review of patient's allergies indicates:   Allergen Reactions    Iodine and iodide containing products        Medications:      Past Medical History:      Past Medical History:   Diagnosis Date    Allergy     Anxiety     Bladder cancer 01/25/2021    Coronary artery disease     Depression     Essential (primary) hypertension     GERD (gastroesophageal reflux disease)     Hypercholesterolemia     Paranoid schizophrenia     Type II diabetes mellitus     Urinary tract infection        Family History:      Family History   Problem Relation Age of Onset    Coronary artery disease Mother     No Known Problems Father      No Known Problems Sister     No Known Problems Sister     No Known Problems Sister     No Known Problems Sister     No Known Problems Sister     Coronary artery disease Brother     Coronary artery disease Brother     Cancer Maternal Grandmother     Skin cancer Maternal Grandmother     Cancer Maternal Aunt     No Known Problems Maternal Uncle     No Known Problems Maternal Uncle     No Known Problems Maternal Uncle     No Known Problems Maternal Uncle     No Known Problems Maternal Uncle     No Known Problems Maternal Uncle     No Known Problems Maternal Uncle     Colon cancer Neg Hx     Colon polyps Neg Hx     Crohn's disease Neg Hx     Liver disease Neg Hx         Social History:      Past Surgical History:   Procedure Laterality Date    ANKLE SURGERY      BLADDER TUMOR EXCISION      2X 2021/ Dec 2022    CORONARY ANGIOPLASTY WITH STENT PLACEMENT      CYSTOSCOPY      FOOT FRACTURE SURGERY      HAND SURGERY Bilateral     TURBT (TRANSURETHRAL RESECTION OF BLADDER TUMOR)  05/01/2023    WRIST SURGERY         Physical Exam:  There were no vitals filed for this visit.  Physical Exam   Constitutional: He is oriented to person, place, and time. He appears not cachectic. No distress.   HENT:   Head: Normocephalic.   Right Ear: External ear normal.   Left Ear: External ear normal.   Nose: Nose normal. No mucosal edema. No polyps.   Mouth/Throat: Oropharynx is clear and moist. Normal dentition. No oropharyngeal exudate.   Neck: No JVD present. No tracheal deviation present. No thyromegaly present.   Cardiovascular: Normal rate, regular rhythm, normal heart sounds and intact distal pulses. Exam reveals no gallop and no friction rub.   No murmur heard.  Pulmonary/Chest: Normal expansion, symmetric chest wall expansion, effort normal and breath sounds normal. No stridor. No respiratory distress. He has no decreased breath sounds. He has no wheezes. He has no rhonchi. He has no rales. Chest wall is not dull to percussion. He  exhibits no tenderness. Negative for egophony. Negative for tactile fremitus.   Abdominal: Soft. Bowel sounds are normal. He exhibits no distension and no mass. There is no hepatosplenomegaly. There is no abdominal tenderness. There is no rebound and no guarding. No hernia.   Musculoskeletal:         General: No tenderness, deformity or edema. Normal range of motion.      Cervical back: Normal range of motion and neck supple.   Lymphadenopathy: No supraclavicular adenopathy is present.     He has no cervical adenopathy.     He has no axillary adenopathy.   Neurological: He is alert and oriented to person, place, and time. He has normal reflexes. He displays normal reflexes. No cranial nerve deficit. He exhibits normal muscle tone.   Skin: Skin is warm and dry. No rash noted. He is not diaphoretic. No cyanosis or erythema. No pallor. Nails show no clubbing.   Psychiatric: He has a normal mood and affect. His behavior is normal. Judgment and thought content normal.       Accessory Clinical Data:  Chest x-ray:    CT scan:  I have reviewed the CT chest and recent PET-CT scans.  Findings are dictated under HPI.    PFTs:  Essentially normal PFTs    6MWT:  None available    TTE:    Lab data:    All radiographic imaging of the chest, PFT tracings/data, and 6MWT data have been independently reviewed and interpreted unless otherwise specified.     Assessment and Plan:        Problem List Items Addressed This Visit    None  Visit Diagnoses       Multiple lung nodules    -  Primary    Stage IV bladder cancer        Coronary artery disease, unspecified vessel or lesion type, unspecified whether angina present, unspecified whether native or transplanted heart        Hyperlipidemia, unspecified hyperlipidemia type        Gastroesophageal reflux disease without esophagitis        Tobacco abuse        Tobacco abuse counseling               Patient seems to have metastatic disease.  He does have stage IV bladder cancer with features  of colorectal cancer.  He is going to receive palliative chemo immune therapy and is status post palliative radiation therapy.  Given history of tobacco use his oncologist wants to look for probable concurrent pulmonary malignancy.    Images were shared with the patient.  Rationale of diagnosing these lesions, risks versus benefits were explained to the patient in depth.  He is agreeable to proceed with diagnostic workup.    Previous CT scan can not be converted into an ion protocol format.  We will obtain a CT chest without contrast ion protocol tomorrow and obtain the images on a disc to plan on planning software.  Medication list show Effient but patient is currently not taking any blood thinners.  He was instructed to strictly hold on all blood thinners till biopsy.    We will plan on doing robot assisted bronchoscopy for biopsy coming Tuesday.    More than 50% of 60 min time was spent face-to-face on counseling, in reviewing the imaging studies, reviewing notes from primary care provider, performing appropriate examination, counseling and educating the patient regarding the findings on CT/PET scan, ordering appropriate follow-up imaging studies, pre planning of procedure in planning software of ion robotic platform, documenting clinical information in the electronic medical record, care coordination as well as communicating with the primary referring physician.        Follow up for Biopsy results.  Thank you very much for involving me in the care of this patient.  Please do not hesitate to reach me if you have any further questions or concerns.    This note is dictated on M*Modal word recognition program.  There are word recognition mistakes that are occasionally missed on review.

## 2023-07-07 ENCOUNTER — TELEPHONE (OUTPATIENT)
Dept: PULMONOLOGY | Facility: CLINIC | Age: 58
End: 2023-07-07
Payer: MEDICAID

## 2023-07-07 NOTE — TELEPHONE ENCOUNTER
Attempted to call back this numbers several times from different phones all kept saying we could not complete your call. Also called Naye with [employer] thinking this is one of her people no answer and left voicemail. LB  ----- Message from Zena Childress sent at 7/7/2023 10:37 AM CDT -----  Contact: neida Tate  Is calling to get the CPT code for the pt for navigational broncoscopy and can be reached at [phone]//thanks/dbw

## 2023-07-10 ENCOUNTER — TELEPHONE (OUTPATIENT)
Dept: HEMATOLOGY/ONCOLOGY | Facility: CLINIC | Age: 58
End: 2023-07-10

## 2023-07-10 NOTE — TELEPHONE ENCOUNTER
----- Message from Callie Gonsalves sent at 7/10/2023 12:01 PM CDT -----  Contact: self  Requesting a call back regarding getting x-ray scheduled and done today. Please call back at [phone] (mobile)

## 2023-07-10 NOTE — TELEPHONE ENCOUNTER
Reached out to the patient. Spoke to family who states he stepped out and went to the corner store will be back in 10 minutes and geovanni have patient call us. Will discuss transportation  with Beti Valle and patient to see if we can set him up outside of medicaid transportation d/t them not showing up per patient.

## 2023-07-11 ENCOUNTER — OUTSIDE PLACE OF SERVICE (OUTPATIENT)
Dept: PULMONOLOGY | Facility: CLINIC | Age: 58
End: 2023-07-11
Payer: MEDICAID

## 2023-07-11 ENCOUNTER — TELEPHONE (OUTPATIENT)
Dept: HEMATOLOGY/ONCOLOGY | Facility: CLINIC | Age: 58
End: 2023-07-11

## 2023-07-11 LAB
ALBUMIN SERPL BCP-MCNC: 3.4 G/DL (ref 3.4–5)
ALBUMIN/GLOBULIN RATIO: 0.72 RATIO (ref 1.1–1.8)
ALP SERPL-CCNC: 86 U/L (ref 46–116)
ALT SERPL W P-5'-P-CCNC: 18 U/L (ref 12–78)
ANION GAP SERPL CALC-SCNC: 6 MMOL/L (ref 3–11)
AST SERPL-CCNC: 22 U/L (ref 15–37)
BASOPHILS NFR BLD: 0.3 % (ref 0–3)
BILIRUB SERPL-MCNC: 0.3 MG/DL (ref 0–1)
BUN SERPL-MCNC: 14 MG/DL (ref 7–18)
BUN/CREAT SERPL: 11.57 RATIO (ref 7–18)
CALCIUM SERPL-MCNC: 9.6 MG/DL (ref 8.8–10.5)
CHLORIDE SERPL-SCNC: 102 MMOL/L (ref 100–108)
CO2 SERPL-SCNC: 29 MMOL/L (ref 21–32)
CREAT SERPL-MCNC: 1.21 MG/DL (ref 0.7–1.3)
EOSINOPHIL NFR BLD: 1.9 % (ref 1–3)
ERYTHROCYTE [DISTWIDTH] IN BLOOD BY AUTOMATED COUNT: 14.4 % (ref 12.5–18)
GFR ESTIMATION: > 60
GLOBULIN: 4.7 G/DL (ref 2.3–3.5)
GLUCOSE SERPL-MCNC: 101 MG/DL (ref 70–110)
HCT VFR BLD AUTO: 45.6 % (ref 42–52)
HGB BLD-MCNC: 15.1 G/DL (ref 14–18)
LYMPHOCYTES NFR BLD: 15.8 % (ref 25–40)
MCH RBC QN AUTO: 30.9 PG (ref 27–31.2)
MCHC RBC AUTO-ENTMCNC: 33.1 G/DL (ref 31.8–35.4)
MCV RBC AUTO: 93.4 FL (ref 80–97)
MONOCYTES NFR BLD: 9.5 % (ref 1–15)
NEUTROPHILS # BLD AUTO: 6.94 10*3/UL (ref 1.8–7.7)
NEUTROPHILS NFR BLD: 71.8 % (ref 37–80)
NUCLEATED RED BLOOD CELLS: 0 %
PLATELETS: 263 10*3/UL (ref 142–424)
POTASSIUM SERPL-SCNC: 4.4 MMOL/L (ref 3.6–5.2)
PROT SERPL-MCNC: 8.1 G/DL (ref 6.4–8.2)
RBC # BLD AUTO: 4.88 10*6/UL (ref 4.7–6.1)
SODIUM BLD-SCNC: 137 MMOL/L (ref 135–145)
WBC # BLD: 9.7 10*3/UL (ref 4.6–10.2)

## 2023-07-11 PROCEDURE — 31623 DX BRONCHOSCOPE/BRUSH: CPT | Mod: 51,LT,, | Performed by: INTERNAL MEDICINE

## 2023-07-11 PROCEDURE — 31624 DX BRONCHOSCOPE/LAVAGE: CPT | Mod: 51,RT,, | Performed by: INTERNAL MEDICINE

## 2023-07-11 PROCEDURE — 31654 PR BRONCH W/ EBUS, DIAG OR THERA INTERVENTION PERIPHERAL LESION(S), INCL GUID, ADD ON CODE: ICD-10-PCS | Mod: ,,, | Performed by: INTERNAL MEDICINE

## 2023-07-11 PROCEDURE — 31632 PR BRONCHOSCOPY/LUNG BX, ADD'L: ICD-10-PCS | Mod: ,,, | Performed by: INTERNAL MEDICINE

## 2023-07-11 PROCEDURE — 31627 NAVIGATIONAL BRONCHOSCOPY: CPT | Mod: ,,, | Performed by: INTERNAL MEDICINE

## 2023-07-11 PROCEDURE — 31633 BRONCHOSCOPY/NEEDLE BX ADDL: CPT | Mod: ,,, | Performed by: INTERNAL MEDICINE

## 2023-07-11 PROCEDURE — 31628 BRONCHOSCOPY/LUNG BX EACH: CPT | Mod: 59,LT,, | Performed by: INTERNAL MEDICINE

## 2023-07-11 PROCEDURE — 31623 PR BRONCHOSCOPY,DIAGNOSTIC W BRUSH: ICD-10-PCS | Mod: 51,LT,, | Performed by: INTERNAL MEDICINE

## 2023-07-11 PROCEDURE — 31632 BRONCHOSCOPY/LUNG BX ADDL: CPT | Mod: ,,, | Performed by: INTERNAL MEDICINE

## 2023-07-11 PROCEDURE — 31654 BRONCH EBUS IVNTJ PERPH LES: CPT | Mod: ,,, | Performed by: INTERNAL MEDICINE

## 2023-07-11 PROCEDURE — 31627 PR BRONCHOSCOPY,COMPUTER ASSIST/IMAGE-GUIDED NAVIGATION: ICD-10-PCS | Mod: ,,, | Performed by: INTERNAL MEDICINE

## 2023-07-11 PROCEDURE — 31628 PR BRONCHOSCOPY,TRANSBRONCH BIOPSY: ICD-10-PCS | Mod: 59,LT,, | Performed by: INTERNAL MEDICINE

## 2023-07-11 PROCEDURE — 31633 PR BRONCHOSCOPY/NEEDLE BX ADD'L: ICD-10-PCS | Mod: ,,, | Performed by: INTERNAL MEDICINE

## 2023-07-11 PROCEDURE — 31652 BRONCH EBUS SAMPLNG 1/2 NODE: CPT | Mod: ,,, | Performed by: INTERNAL MEDICINE

## 2023-07-11 PROCEDURE — 31629 PR BRONCHOSCOPY,TRANSBRON ASPIR BX: ICD-10-PCS | Mod: 59,RT,, | Performed by: INTERNAL MEDICINE

## 2023-07-11 PROCEDURE — 31624 PR BRONCHOSCOPY,DIAG2STIC W LAVAGE: ICD-10-PCS | Mod: 51,RT,, | Performed by: INTERNAL MEDICINE

## 2023-07-11 PROCEDURE — 31629 BRONCHOSCOPY/NEEDLE BX EACH: CPT | Mod: 59,RT,, | Performed by: INTERNAL MEDICINE

## 2023-07-11 PROCEDURE — 31652 PR BRONCH W/ EBUS, SAMPLING 1 OR 2 NODES, INCL GUIDE: ICD-10-PCS | Mod: ,,, | Performed by: INTERNAL MEDICINE

## 2023-07-11 NOTE — TELEPHONE ENCOUNTER
Spoke to patient. Medical transportation dropped him off at the wrong location this AM. Patient provided transportation to [facility]. Discussed future transportation with the patient. Patient states that he is set up with transportation for his chemo education Thursday. Patient understands he is to call if they do not show up. Patient aware that assistance can be provided in advance if needed. Denies need this week. Direct number provided so that patient may contact us ASAP for any issues. TTRN

## 2023-07-12 ENCOUNTER — TELEPHONE (OUTPATIENT)
Dept: HEMATOLOGY/ONCOLOGY | Facility: CLINIC | Age: 58
End: 2023-07-12
Payer: MEDICAID

## 2023-07-12 ENCOUNTER — NURSE TRIAGE (OUTPATIENT)
Dept: ADMINISTRATIVE | Facility: CLINIC | Age: 58
End: 2023-07-12
Payer: MEDICAID

## 2023-07-12 NOTE — PROGRESS NOTES
MEDICAL ONCOLOGY FOLLOW UP  NOTE    Patient ID: Chucho Mack is a 58 y.o. male.    Chief Complaint: Bladder cancer    HPI:  Patient is a 55-year-old male with past medical history of anxiety, gastroesophageal reflux disease, hyperlipidemia, paranoid schizophrenia who  recently underwent transurethral resection of large bladder tumor > 8 cm by Urology and pathology was conclusive of bladder cancer.  Please see pathology report below. He presents to medical oncology clinic today for further evaluation. Reports hematuria post TURBT but doing better since then.    BLADDER TUMOR, TRANSURETHRAL RESECTION: 12/23/2020    - ADENOCARCINOMA, INVASIVE, MODERATELY DIFFERENTIATED.   - TUMOR INVADES MUSCULARIS PROPRIA.   - NEGATIVE FOR LYMPHOVASCULAR INVASION.   - SEE COMMENT.   Comment:   To further evaluate the specimen, the following immunohistochemical stains   were performed by [facility] laboratory, on block 1H with   adequate controls and with interpretation as indicated:      CK7:      negative 0030 S>     CK20:      positive      CDX2:      positive      p63:      negative   Primary adenocarcinomas of the bladder  may show significant morphologic and   immunohistochemical overlap with adenocarcinomas from other primary sites.   In this instance, the tumor shows striking morphologic and   immunohistochemical similarity to primary colorectal adenocarcinoma.   Careful correlation with endoscopic and radiographic findings is necessary   for determination of the definitive primary site.     Imaging:      CT Abdomen and pelvis: 10/7/2020    - Partially calcified soft tissue intraluminal lesion of the urinary bladder.  Malignancy least differential.  - heterogeneously enhancing complex 2.2 cm lesion of the right kidney.  - Cyst of the bilateral kidneys.  - hepatic steatosis with focal fatty sparing.  - colonic diverticular disease    CT chest w/o contrast: 1/25/2021  1.  Fatty infiltration of the liver.        2.   No other acute pathology    CT scan A/P: 5/13/2021    - The bladder mass is increased in size compared with CT from August 2020.  Is much carlos of the adjacent aurora-serosal fat may reflect invasion.  - there are several bilateral renal hypodensities, most of which are too small to characterize in terms of attenuation.  This is stable compared with prior exam.  The largest is a cyst in the left lower pole.  One that is indeterminate in the mid to lower pole the right kidney is probably large enough to characterize a cystic or solid by ultrasound.  Consider ultrasound of this lesion is not been evaluated previously.    Labs:  Lab Results   Component Value Date    WBC 9.7 07/11/2023    HGB 15.1 07/11/2023    HCT 45.6 07/11/2023    MCV 93.4 07/11/2023    LABPLAT 263 07/11/2023      Platelets   Date Value Ref Range Status   07/11/2023 263 142 - 424 10*3/uL Final     CMP  Sodium   Date Value Ref Range Status   07/11/2023 137 135 - 145 mmol/L Final     Potassium   Date Value Ref Range Status   07/11/2023 4.4 3.6 - 5.2 mmol/L Final     Chloride   Date Value Ref Range Status   07/11/2023 102 100 - 108 mmol/L Final     CO2   Date Value Ref Range Status   07/11/2023 29 21 - 32 mmol/L Final     Glucose   Date Value Ref Range Status   07/11/2023 101 70 - 110 mg/dL Final     BUN   Date Value Ref Range Status   07/11/2023 14 7 - 18 mg/dL Final     Creatinine   Date Value Ref Range Status   07/11/2023 1.21 0.70 - 1.30 mg/dL Final     Calcium   Date Value Ref Range Status   07/11/2023 9.6 8.8 - 10.5 mg/dL Final     Total Protein   Date Value Ref Range Status   07/11/2023 8.1 6.4 - 8.2 g/dL Final     Albumin   Date Value Ref Range Status   07/11/2023 3.4 3.4 - 5.0 g/dL Final     Total Bilirubin   Date Value Ref Range Status   07/11/2023 0.3 0.0 - 1.0 mg/dL Final     Alkaline Phosphatase   Date Value Ref Range Status   07/11/2023 86 46 - 116 U/L Final     AST   Date Value Ref Range Status   07/11/2023 22 15 - 37 U/L Final     ALT    Date Value Ref Range Status   2023 18 12 - 78 U/L Final     Anion Gap   Date Value Ref Range Status   2023 6.0 3.0 - 11.0 mmol/L Final            Past Medical History:   Diagnosis Date    Allergy     Anxiety     Bladder cancer 2021    Coronary artery disease     Depression     Essential (primary) hypertension     GERD (gastroesophageal reflux disease)     Hypercholesterolemia     Paranoid schizophrenia     Type II diabetes mellitus     Urinary tract infection      Family History   Problem Relation Age of Onset    Coronary artery disease Mother     No Known Problems Father     No Known Problems Sister     No Known Problems Sister     No Known Problems Sister     No Known Problems Sister     No Known Problems Sister     Coronary artery disease Brother     Coronary artery disease Brother     Cancer Maternal Grandmother     Skin cancer Maternal Grandmother     Cancer Maternal Aunt     No Known Problems Maternal Uncle     No Known Problems Maternal Uncle     No Known Problems Maternal Uncle     No Known Problems Maternal Uncle     No Known Problems Maternal Uncle     No Known Problems Maternal Uncle     No Known Problems Maternal Uncle     Colon cancer Neg Hx     Colon polyps Neg Hx     Crohn's disease Neg Hx     Liver disease Neg Hx      Social History     Socioeconomic History    Marital status:    Tobacco Use    Smoking status: Every Day     Packs/day: 1.50     Years: 44.00     Pack years: 66.00     Types: Cigarettes     Start date: 1977     Last attempt to quit: 2020     Years since quittin.5     Passive exposure: Past    Smokeless tobacco: Never    Tobacco comments:     4 cigarettes a day currently 2023. LB   Substance and Sexual Activity    Alcohol use: Not Currently    Drug use: Never     Past Surgical History:   Procedure Laterality Date    ANKLE SURGERY      BLADDER TUMOR EXCISION      2X 2021/ Dec 2022    CORONARY ANGIOPLASTY WITH STENT PLACEMENT      CYSTOSCOPY       FOOT FRACTURE SURGERY      HAND SURGERY Bilateral     TURBT (TRANSURETHRAL RESECTION OF BLADDER TUMOR)  05/01/2023    WRIST SURGERY           Review of systems:  Review of Systems   Constitutional:  Negative for activity change, appetite change, chills, diaphoresis, fatigue and unexpected weight change.   HENT:  Negative for congestion, facial swelling, hearing loss, mouth sores, trouble swallowing and voice change.    Eyes:  Negative for photophobia, pain, discharge and itching.   Respiratory:  Negative for apnea, cough, choking, chest tightness and shortness of breath.    Cardiovascular:  Negative for chest pain, palpitations and leg swelling.   Gastrointestinal:  Negative for abdominal distention, abdominal pain, anal bleeding and blood in stool.   Endocrine: Negative for cold intolerance, heat intolerance, polydipsia and polyphagia.   Genitourinary:  Positive for hematuria. Negative for difficulty urinating, dysuria and flank pain.   Musculoskeletal:  Negative for arthralgias, back pain, joint swelling, myalgias, neck pain and neck stiffness.   Skin:  Negative for color change, pallor and wound.   Allergic/Immunologic: Negative for environmental allergies, food allergies and immunocompromised state.   Neurological:  Negative for dizziness, seizures, facial asymmetry, speech difficulty, light-headedness, numbness and headaches.   Hematological:  Negative for adenopathy. Does not bruise/bleed easily.   Psychiatric/Behavioral:  Negative for agitation, behavioral problems, confusion, decreased concentration and sleep disturbance.            Physical Exam  Vitals and nursing note reviewed.   Constitutional:       General: He is not in acute distress.     Appearance: Normal appearance. He is not ill-appearing.   HENT:      Head: Normocephalic and atraumatic.      Nose: No congestion or rhinorrhea.   Eyes:      General: No scleral icterus.     Extraocular Movements: Extraocular movements intact.      Pupils: Pupils  are equal, round, and reactive to light.   Cardiovascular:      Rate and Rhythm: Normal rate and regular rhythm.      Pulses: Normal pulses.      Heart sounds: Normal heart sounds. No murmur heard.    No gallop.   Pulmonary:      Effort: Pulmonary effort is normal. No respiratory distress.      Breath sounds: Normal breath sounds. No stridor. No wheezing or rhonchi.   Abdominal:      General: Bowel sounds are normal. There is no distension.      Palpations: There is no mass.      Tenderness: There is no abdominal tenderness. There is no guarding.   Musculoskeletal:         General: No swelling, tenderness, deformity or signs of injury. Normal range of motion.      Cervical back: Normal range of motion and neck supple. No rigidity. No muscular tenderness.      Right lower leg: No edema.      Left lower leg: No edema.   Skin:     General: Skin is warm.      Coloration: Skin is not jaundiced or pale.      Findings: No bruising or lesion.   Neurological:      General: No focal deficit present.      Mental Status: He is alert and oriented to person, place, and time.      Cranial Nerves: No cranial nerve deficit.      Sensory: No sensory deficit.      Motor: No weakness.      Gait: Gait normal.   Psychiatric:         Mood and Affect: Mood normal.         Behavior: Behavior normal.         Thought Content: Thought content normal.      There were no vitals filed for this visit.    There is no height or weight on file to calculate BSA.    Assessment/Plan:      ECOG 1    1. Invasive moderately differentiated muscle invasive bladder tumor s/p TURBT 12/21/2020: Stage IIIA at presentation now with Stage IV  -- Prior CT scan done 08/2020 showed no evidence of lymphadenopathy. However based on the pathology report, we could be looking at primary colo-rectal adenocarcinoma with metastasis.  CT scan chest reviewed, so far no signs of distant metastatic disease.  -- Denies any bone pain so do not need any bone imaging as clinical  suspicion for bone mets low unless  Alkaline phosphatase elevated.   -- Discussed with patient further management options for muscle invasive bladder cancer s/p TURBT and the pathology report showing findings with a possible bladder cancer vs colo-rectal cancer primary. If not deemed bladder cancer than no specific role of radiation and will be treated with as metastatic colon cancer.  -- Discussed in TMB meeting 1/21/2020 and consensus decision to obtain C-scopy and if no mass evident on C-scopy, pathology would send for further testing/ ? Second opinion. Referral to GI was placed and he is scheduled to get C-scopy 1/26/2020. C-scopy did not reveal any suspicious lesions which patient delayed getting C-scopy for a long time    -- 3/11/2021: Patient did not present back to clinic until today after his last visit, end of Jan 2021. He also missed his GI appointment which was scheduled urgently at the time. He reports going through family stressors. I discussed with him and counseled about his non-compliance in coming to appointments. Will obtain restaging scans and will await his C-scopy results. Again counseled not to miss appointments.    -- 5/25/2021:  Patient presents to clinic after a delay of more than 2 months and again does not seem to have a valid reason for causing delay in his own care.  He reports having a recent visit to ER where he was noted to have a urinary tract infection and he also underwent CT scan A/P which just showed increase in size of the bladder mass to approximately 5-6 cm from 4 cm.  I discussed with patient in detail about future management options and also pathology confirming this to be a bladder tumor with pure enteric colonic morphology but no GI primary.  I understand this is a very unreliable patient and might not show up for chemotherapy, hence with the support of our staff we will  involve his family.  == Patient reports that he does not have the resources to undergo any type of  "radical surgery including a radical cystectomy as he already have transport issues even coming to clinic here and would be unable to go anywhere outside the city.  He would like to pursue with bladder preservation with concurrent chemoradiotherapy. I discussed with him that trimodality therapy (TMT) incorporating maximal TURBT followed by radiation therapy (RT) with concurrent radiosensitizing chemotherapy can be a comparably effective regimen to preserve a functioning bladder in well-selected patients with muscle-invasive bladder cancer.   == Will use 5FU Mitomycin with XRT. He is scheduled to see Rad-onc tomorrow and will need port/IV access prior to starting chemotherapy, pt is unable to get PORT, alternative plan is to get PICC line on Friday and proceed with Gemzar/cisplatin.     --6/18/21: s/p C1D1 &D8 gem/cis with XRT maegan well, mild nausea noted but controlled with prescribed oral antiemetics. Pt has noted less hematuria and feels overall "good"   --7/6/21:  Patient is one-week delay for cycle 2 due to transportation difficulties last week.  Patient did maintain most of daily radiation though.  Discussed with patient importance of maintaining chemotherapy appointments and will begin doing weekly labs with Kourtney at [facility].  Patient states feeling pretty good no real side effects current leaf from radiation or chemotherapy to complain of.  Plan is to proceed with cycle 2 day 1 on Thursday.  Return to infusion next Thursday for cycle 2 day 8 and return to clinic in 3 weeks prior to cycle 3  --7/29/21: pt here today to begin cycle 3. He is to complete XRT next week. So far tolerating tx very well. Labs reviewed and no new c/o.   --8/10/21: s/p c1d8, completed xrt this week, doing well, labs reviewed and pt cleared for chemo today.   --9/8/21: pt here in clinic today after 3 week absence due to mother passing. He missed his 4 th of chemo but would like to resume his chemo next week. Labs today reviewed " and infusion is notified to call him to schedule C4D1 next week. Pt requesting pain medication refill.   Scans to be scheduled after cycle 4 is completed.   --9/29/21: pt here today after not completing cycle 4 or doing ct scans. Counseled patient regarding need for repeat imaging for active surveillance of disease as well as tapering narcotic pain medication now that he has completed chemo/xrt. Today percocet is changed to Norco 5/325mg #30 and pt understands that this will be his final narcotic prescription provided. Orders placed today for PICC to be pulled at infusion.  He is to RTC in 3 weeks with CT scans.   ==4/26/23:  Patient presenting to this clinic after a gap of  2 years.  He reports several logistic issues for missing his appointments.  More recently he was seen by Urology for recurrent bladder cancer and plan for TURBT was made.  However it does not show patient has followed back with Urology.  He also reports getting CT scan done at an outside hospital but I do not have any reports available at this time.  I discussed with him about his several no-shows over the last 2 years and the need to look at dose restaging scan before we decide on our next steps.  I strongly encouraged him to keep his upcoming TURBT appointment with Urology and also showed him the note from urology team where they have attempted to contact him.  == 5/24/23:  Patient underwent repeat TURBT and per Urology note he had multiple recurrences in the bladder which were found to be invasive adenocarcinoma high-grade.  Please see the discussion above where patient has repeatedly missed chemo/radiation appointments in the past and more recently presented after a gap of 2 years.  Patient was seen on 05/11/2023 by Dr. Bro post TURBT and he was referred back to Oncology as he had persistent disease after treatment.  Patient was also discussed in tumor board in I am unsure how we can reliably give any kind of treatment to a patient who  does not show up persistently.  I will order a PET scan for restaging and to evaluate the lung nodules noted on recent CT scan.  For a patient who was repeatedly noncompliant and does not show up and disappears during chemotherapy I do not feel I have much to offer at this time.  If he is confirmed stage IV based on PET scan results then palliative gemcitabine and carboplatin followed by avelumab maintenance therapy is a consideration.  PET scan has been ordered for restaging. Also depending on his stage and symptoms-- palliative XRT could have a role but he has had multiple no shows with Rad-onc in the past as well.  == 6/21/23:  PET scan for restaging showed a 2.8 x 5.2 cm mass in the anterior aspect of urinary bladder cavity with foci of amorphous calcification.  Bilateral few scattered lung nodules with increased FDG activity consistent with metastasis.  Soft tissue fullness with increased FDG activity of left oropharynx.  Patient is status post palliative localized re-irradiation secondary to urinary obstructive symptoms and bleeding.  Discussed with patient in detail future management options for palliative chemo immunotherapy for patients who are cis-platinum eligible and will send prior authorization request for gemcitabine and carboplatin him to be followed by avelumab maintenance therapy.  Will also send Tempus testing at this time  ==07/13/2023 Tempus PDL-1 negative, remaining tempus testing pending.   Pt here today to discuss upcoming chemotherapy, side effect profile, risk versus benefits, and expected outcomes have been discussed today. All questions and concerns answered and consents have been signed.  Labs drawn on 7/11/23. Will start chemotherapy on 7/20/2023.  Port placed, skin wnl, no erythema    2. CAD:    -- S/p stent placement 03/2021 and follows cardiology  -- Stable    Plan:    Tempus testing - pending    RTC on 7/20/2023 start chemo    Total time spent in counseling and discussion was more  than 60 minutes. I discussed in detail further management options at this time and discussion on relevant tests, imaging and ancillary tests as part of the management plan.

## 2023-07-12 NOTE — TELEPHONE ENCOUNTER
----- Message from Caridad Cruz sent at 7/12/2023 11:57 AM CDT -----  Contact: pt  Pt returning a missed call and pt stated he has been coughing up blood since biscopy and he can be reached at [phone]    Thanks,

## 2023-07-12 NOTE — TELEPHONE ENCOUNTER
Pt calling states that he had biopsy on his lungs done yesterday at [facility] with Dr. Titus. States that today he started coughing up blood and has a pain at the lower end of his throat. Reports about 1.5 oz or about 100ml of blood that he has coughed up. Pt is very worried. Denies any CP or trouble breathing. Per protocol advised to ED NOW. verbalized understanding. Denies any further questions or concerns at this time, advised to call back if they have any that come up. Advised pt to call back with any other concerns or worsening symptoms. Verbalized understanding and will route message to provider.     Reason for Disposition   Patient sounds very sick or weak to the triager    Additional Information   Negative: SEVERE difficulty breathing (e.g., struggling for each breath, speaks in single words)   Negative: Chest pain and difficulty breathing   Negative: Bluish (or gray) lips or face now   Negative: Passed out (i.e., lost consciousness, collapsed and was not responding)   Negative: Shock suspected (e.g., cold/pale/clammy skin, too weak to stand, low BP, rapid pulse)   Negative: Difficult to awaken or acting confused (e.g., disoriented, slurred speech)   Negative: Recent chest injury (i.e., past 24 hours)   Negative: Coughed up blood and large amount (Such as: 'a half cup of blood')   Negative: Sounds like a life-threatening emergency to the triager   Negative: MODERATE difficulty breathing (e.g., speaks in phrases, SOB even at rest, pulse 100-120) and still present when not coughing   Negative: Chest pain   Negative: Unclear to triager if the patient is coughing up blood or vomiting blood   Negative: History of prior 'blood clot' in leg or lungs (i.e., deep vein thrombosis, pulmonary embolism)   Negative: History of inherited increased risk of blood clots (e.g., Factor 5 Leiden, Anti-thrombin 3, Protein C or Protein S deficiency, Prothrombin mutation)   Negative: Pregnant or postpartum (from 0 to 6  weeks after delivery)     Pt is male   Negative: Hip or leg fracture (broken bone) in past month (or had cast on leg or ankle in past month)   Negative: Long-distance travel in past month (e.g., car, bus, train, plane; with trip lasting 6 or more hours)   Negative: Bedridden (e.g., nursing home patient, CVA, chronic illness, recovering from surgery)    Protocols used: Coughing Up Blood-A-[place]

## 2023-07-12 NOTE — TELEPHONE ENCOUNTER
Called pt and a lady answered and told me to call [phone]. His sister answered and told me he was at the library. I asked if his symptoms subsided or is he still experiencing symptoms. She stated he was coughing up blood before he left to the library. I could barely understand her as there was background noise . I told her I will attempt to call the pt again and speak with him himself. Did try calling back, no answer.

## 2023-07-13 ENCOUNTER — OFFICE VISIT (OUTPATIENT)
Dept: HEMATOLOGY/ONCOLOGY | Facility: CLINIC | Age: 58
End: 2023-07-13
Payer: MEDICAID

## 2023-07-13 VITALS
HEART RATE: 74 BPM | HEIGHT: 68 IN | SYSTOLIC BLOOD PRESSURE: 206 MMHG | OXYGEN SATURATION: 97 % | RESPIRATION RATE: 16 BRPM | BODY MASS INDEX: 28.52 KG/M2 | DIASTOLIC BLOOD PRESSURE: 49 MMHG | WEIGHT: 188.19 LBS

## 2023-07-13 DIAGNOSIS — R91.1 SOLITARY PULMONARY NODULE: ICD-10-CM

## 2023-07-13 DIAGNOSIS — C67.8 MALIGNANT NEOPLASM OF OVERLAPPING SITES OF BLADDER: Primary | ICD-10-CM

## 2023-07-13 PROCEDURE — 1159F PR MEDICATION LIST DOCUMENTED IN MEDICAL RECORD: ICD-10-PCS | Mod: CPTII,S$GLB,, | Performed by: NURSE PRACTITIONER

## 2023-07-13 PROCEDURE — 3078F PR MOST RECENT DIASTOLIC BLOOD PRESSURE < 80 MM HG: ICD-10-PCS | Mod: CPTII,S$GLB,, | Performed by: NURSE PRACTITIONER

## 2023-07-13 PROCEDURE — 4010F ACE/ARB THERAPY RXD/TAKEN: CPT | Mod: CPTII,S$GLB,, | Performed by: NURSE PRACTITIONER

## 2023-07-13 PROCEDURE — 3077F PR MOST RECENT SYSTOLIC BLOOD PRESSURE >= 140 MM HG: ICD-10-PCS | Mod: CPTII,S$GLB,, | Performed by: NURSE PRACTITIONER

## 2023-07-13 PROCEDURE — 3078F DIAST BP <80 MM HG: CPT | Mod: CPTII,S$GLB,, | Performed by: NURSE PRACTITIONER

## 2023-07-13 PROCEDURE — 1159F MED LIST DOCD IN RCRD: CPT | Mod: CPTII,S$GLB,, | Performed by: NURSE PRACTITIONER

## 2023-07-13 PROCEDURE — 3008F PR BODY MASS INDEX (BMI) DOCUMENTED: ICD-10-PCS | Mod: CPTII,S$GLB,, | Performed by: NURSE PRACTITIONER

## 2023-07-13 PROCEDURE — 99215 PR OFFICE/OUTPT VISIT, EST, LEVL V, 40-54 MIN: ICD-10-PCS | Mod: S$GLB,,, | Performed by: NURSE PRACTITIONER

## 2023-07-13 PROCEDURE — 3077F SYST BP >= 140 MM HG: CPT | Mod: CPTII,S$GLB,, | Performed by: NURSE PRACTITIONER

## 2023-07-13 PROCEDURE — 4010F PR ACE/ARB THEARPY RXD/TAKEN: ICD-10-PCS | Mod: CPTII,S$GLB,, | Performed by: NURSE PRACTITIONER

## 2023-07-13 PROCEDURE — 3008F BODY MASS INDEX DOCD: CPT | Mod: CPTII,S$GLB,, | Performed by: NURSE PRACTITIONER

## 2023-07-13 PROCEDURE — 99215 OFFICE O/P EST HI 40 MIN: CPT | Mod: S$GLB,,, | Performed by: NURSE PRACTITIONER

## 2023-07-17 ENCOUNTER — TELEPHONE (OUTPATIENT)
Dept: HEMATOLOGY/ONCOLOGY | Facility: CLINIC | Age: 58
End: 2023-07-17
Payer: MEDICAID

## 2023-07-17 NOTE — TELEPHONE ENCOUNTER
Called to  discuss that patient would not be starting treatment this week. Patient did not answer. TTRN

## 2023-07-18 DIAGNOSIS — G89.3 CANCER RELATED PAIN: Primary | ICD-10-CM

## 2023-07-18 DIAGNOSIS — C67.8 MALIGNANT NEOPLASM OF OVERLAPPING SITES OF BLADDER: ICD-10-CM

## 2023-07-18 RX ORDER — HYDROCODONE BITARTRATE AND ACETAMINOPHEN 10; 325 MG/1; MG/1
1 TABLET ORAL EVERY 8 HOURS PRN
Qty: 60 TABLET | Refills: 0 | Status: SHIPPED | OUTPATIENT
Start: 2023-07-18 | End: 2023-08-24 | Stop reason: SDUPTHER

## 2023-07-20 ENCOUNTER — TELEPHONE (OUTPATIENT)
Dept: HEMATOLOGY/ONCOLOGY | Facility: CLINIC | Age: 58
End: 2023-07-20

## 2023-07-20 NOTE — TELEPHONE ENCOUNTER
Called the patient regarding a follow up appt and to start tx. Patient did not answer. I was able to leave a  requesting a call back asap. Staff made aware. Patient scheduled for 7/27/23. Will need to come in early for labs and a follow up prior to treatment that day. TTRN

## 2023-07-21 ENCOUNTER — TELEPHONE (OUTPATIENT)
Dept: HEMATOLOGY/ONCOLOGY | Facility: CLINIC | Age: 58
End: 2023-07-21
Payer: MEDICAID

## 2023-07-21 NOTE — TELEPHONE ENCOUNTER
Spoke to patient and informed him on his appointment on Wednesday for labs, visit, and to start treatment. I told him he must be here by 7:30 that morning and absolutely no later than 8am. Patient confirmed several times that he could make it. I told him that if he couldn't make it, he should call us to let us know and that if he was late, we would have to reschedule. Patient voiced understanding again. Informed [facility].

## 2023-07-24 LAB
DNA RANGE(S) EXAMINED NAR: NORMAL
GENE DIS ANL INTERP-IMP: POSITIVE
GENE DIS ASSESSED: NORMAL
GENE MUT TESTED BLD/T: 11.1 M/MB
MLH1+MSH2+MSH6+PMS2 GN DEL+DUP+FUL M: NORMAL
MMR ENDO PMS2 CA SPEC QL IMSTN: PRESENT
MMR PROT MLH1 CA SPEC QL IMSTN: PRESENT
MMR PROT MSH2 CA SPEC QL IMSTN: PRESENT
MMR PROT MSH6 CA SPEC QL IMSTN: PRESENT
MSI CA SPEC-IMP: NORMAL
PD-L1 BY 22C3 TISS IMSTN DOC: NEGATIVE
REASON FOR STUDY: NORMAL
TEMPUS AMENDMENTNOTE1: NORMAL
TEMPUS FUSIONADDENDUM: NORMAL
TEMPUS PD-L1 (22C3) COMBINED POSITIVE SCORE: <1
TEMPUS PD-L1 (22C3) TUMOR PROPORTION SCORE: <1 %
TEMPUS PORTAL: NORMAL
TEMPUS TRIAL1: NORMAL
TEMPUS TRIAL2: NORMAL
TEMPUS TRIAL3: NORMAL
TEMPUS TRIALCOUNT: 3

## 2023-07-25 DIAGNOSIS — C67.8 MALIGNANT NEOPLASM OF OVERLAPPING SITES OF BLADDER: Primary | ICD-10-CM

## 2023-07-27 ENCOUNTER — OFFICE VISIT (OUTPATIENT)
Dept: HEMATOLOGY/ONCOLOGY | Facility: CLINIC | Age: 58
End: 2023-07-27
Payer: MEDICAID

## 2023-07-27 VITALS
RESPIRATION RATE: 18 BRPM | HEART RATE: 82 BPM | BODY MASS INDEX: 28.49 KG/M2 | HEIGHT: 68 IN | WEIGHT: 188 LBS | SYSTOLIC BLOOD PRESSURE: 124 MMHG | DIASTOLIC BLOOD PRESSURE: 81 MMHG | OXYGEN SATURATION: 95 %

## 2023-07-27 DIAGNOSIS — R31.0 GROSS HEMATURIA: ICD-10-CM

## 2023-07-27 DIAGNOSIS — C67.8 MALIGNANT NEOPLASM OF OVERLAPPING SITES OF BLADDER: Primary | ICD-10-CM

## 2023-07-27 LAB
ALBUMIN SERPL BCP-MCNC: 3.2 G/DL (ref 3.4–5)
ALBUMIN/GLOBULIN RATIO: 0.8 RATIO (ref 1.1–1.8)
ALP SERPL-CCNC: 83 U/L (ref 46–116)
ALT SERPL W P-5'-P-CCNC: 24 U/L (ref 12–78)
ANION GAP SERPL CALC-SCNC: 12 MMOL/L (ref 3–11)
AST SERPL-CCNC: 27 U/L (ref 15–37)
BASOPHILS NFR BLD: 0.1 % (ref 0–3)
BILIRUB SERPL-MCNC: 0.4 MG/DL (ref 0–1)
BUN SERPL-MCNC: 25 MG/DL (ref 7–18)
BUN/CREAT SERPL: 22.93 RATIO (ref 7–18)
CALCIUM SERPL-MCNC: 8.7 MG/DL (ref 8.8–10.5)
CHLORIDE SERPL-SCNC: 101 MMOL/L (ref 100–108)
CO2 SERPL-SCNC: 22 MMOL/L (ref 21–32)
CREAT SERPL-MCNC: 1.09 MG/DL (ref 0.7–1.3)
EOSINOPHIL NFR BLD: 2.5 % (ref 1–3)
ERYTHROCYTE [DISTWIDTH] IN BLOOD BY AUTOMATED COUNT: 14.3 % (ref 12.5–18)
GFR ESTIMATION: > 60
GLOBULIN: 4 G/DL (ref 2.3–3.5)
GLUCOSE SERPL-MCNC: 88 MG/DL (ref 70–110)
HCT VFR BLD AUTO: 39.6 % (ref 42–52)
HGB BLD-MCNC: 13.2 G/DL (ref 14–18)
LYMPHOCYTES NFR BLD: 16 % (ref 25–40)
MCH RBC QN AUTO: 31.4 PG (ref 27–31.2)
MCHC RBC AUTO-ENTMCNC: 33.3 G/DL (ref 31.8–35.4)
MCV RBC AUTO: 94.3 FL (ref 80–97)
MONOCYTES NFR BLD: 12 % (ref 1–15)
NEUTROPHILS # BLD AUTO: 5.18 10*3/UL (ref 1.8–7.7)
NEUTROPHILS NFR BLD: 68.3 % (ref 37–80)
NUCLEATED RED BLOOD CELLS: 0 %
PLATELETS: 222 10*3/UL (ref 142–424)
POTASSIUM SERPL-SCNC: 4.7 MMOL/L (ref 3.6–5.2)
PROT SERPL-MCNC: 7.2 G/DL (ref 6.4–8.2)
RBC # BLD AUTO: 4.2 10*6/UL (ref 4.7–6.1)
SODIUM BLD-SCNC: 135 MMOL/L (ref 135–145)
WBC # BLD: 7.6 10*3/UL (ref 4.6–10.2)

## 2023-07-27 PROCEDURE — 3008F BODY MASS INDEX DOCD: CPT | Mod: CPTII,S$GLB,, | Performed by: NURSE PRACTITIONER

## 2023-07-27 PROCEDURE — 3074F SYST BP LT 130 MM HG: CPT | Mod: CPTII,S$GLB,, | Performed by: NURSE PRACTITIONER

## 2023-07-27 PROCEDURE — 1159F PR MEDICATION LIST DOCUMENTED IN MEDICAL RECORD: ICD-10-PCS | Mod: CPTII,S$GLB,, | Performed by: NURSE PRACTITIONER

## 2023-07-27 PROCEDURE — 99214 OFFICE O/P EST MOD 30 MIN: CPT | Mod: S$GLB,,, | Performed by: NURSE PRACTITIONER

## 2023-07-27 PROCEDURE — 3074F PR MOST RECENT SYSTOLIC BLOOD PRESSURE < 130 MM HG: ICD-10-PCS | Mod: CPTII,S$GLB,, | Performed by: NURSE PRACTITIONER

## 2023-07-27 PROCEDURE — 3079F PR MOST RECENT DIASTOLIC BLOOD PRESSURE 80-89 MM HG: ICD-10-PCS | Mod: CPTII,S$GLB,, | Performed by: NURSE PRACTITIONER

## 2023-07-27 PROCEDURE — 4010F PR ACE/ARB THEARPY RXD/TAKEN: ICD-10-PCS | Mod: CPTII,S$GLB,, | Performed by: NURSE PRACTITIONER

## 2023-07-27 PROCEDURE — 3008F PR BODY MASS INDEX (BMI) DOCUMENTED: ICD-10-PCS | Mod: CPTII,S$GLB,, | Performed by: NURSE PRACTITIONER

## 2023-07-27 PROCEDURE — 99214 PR OFFICE/OUTPT VISIT, EST, LEVL IV, 30-39 MIN: ICD-10-PCS | Mod: S$GLB,,, | Performed by: NURSE PRACTITIONER

## 2023-07-27 PROCEDURE — 4010F ACE/ARB THERAPY RXD/TAKEN: CPT | Mod: CPTII,S$GLB,, | Performed by: NURSE PRACTITIONER

## 2023-07-27 PROCEDURE — 1159F MED LIST DOCD IN RCRD: CPT | Mod: CPTII,S$GLB,, | Performed by: NURSE PRACTITIONER

## 2023-07-27 PROCEDURE — 3079F DIAST BP 80-89 MM HG: CPT | Mod: CPTII,S$GLB,, | Performed by: NURSE PRACTITIONER

## 2023-07-27 RX ORDER — HEPARIN 100 UNIT/ML
500 SYRINGE INTRAVENOUS
Status: CANCELLED | OUTPATIENT
Start: 2023-08-03

## 2023-07-27 RX ORDER — SODIUM CHLORIDE 0.9 % (FLUSH) 0.9 %
10 SYRINGE (ML) INJECTION
Status: CANCELLED | OUTPATIENT
Start: 2023-08-03

## 2023-07-27 RX ORDER — SODIUM CHLORIDE 0.9 % (FLUSH) 0.9 %
10 SYRINGE (ML) INJECTION
Status: CANCELLED | OUTPATIENT
Start: 2023-07-27

## 2023-07-27 RX ORDER — HEPARIN 100 UNIT/ML
500 SYRINGE INTRAVENOUS
Status: CANCELLED | OUTPATIENT
Start: 2023-07-27

## 2023-07-27 RX ORDER — ONDANSETRON 2 MG/ML
8 INJECTION INTRAMUSCULAR; INTRAVENOUS
Status: CANCELLED | OUTPATIENT
Start: 2023-08-03

## 2023-07-27 NOTE — PROGRESS NOTES
MEDICAL ONCOLOGY FOLLOW UP  NOTE    Patient ID: Chucho Mack is a 58 y.o. male.    Chief Complaint: Bladder cancer    HPI:  Patient is a 55-year-old male with past medical history of anxiety, gastroesophageal reflux disease, hyperlipidemia, paranoid schizophrenia who  recently underwent transurethral resection of large bladder tumor > 8 cm by Urology and pathology was conclusive of bladder cancer.  Please see pathology report below. He presents to medical oncology clinic today for further evaluation. Reports hematuria post TURBT but doing better since then.    BLADDER TUMOR, TRANSURETHRAL RESECTION: 12/23/2020    - ADENOCARCINOMA, INVASIVE, MODERATELY DIFFERENTIATED.   - TUMOR INVADES MUSCULARIS PROPRIA.   - NEGATIVE FOR LYMPHOVASCULAR INVASION.   - SEE COMMENT.   Comment:   To further evaluate the specimen, the following immunohistochemical stains   were performed by [facility] laboratory, on block 1H with   adequate controls and with interpretation as indicated:      CK7:      negative 0030 S>     CK20:      positive      CDX2:      positive      p63:      negative   Primary adenocarcinomas of the bladder  may show significant morphologic and   immunohistochemical overlap with adenocarcinomas from other primary sites.   In this instance, the tumor shows striking morphologic and   immunohistochemical similarity to primary colorectal adenocarcinoma.   Careful correlation with endoscopic and radiographic findings is necessary   for determination of the definitive primary site.     Imaging:      CT Abdomen and pelvis: 10/7/2020    - Partially calcified soft tissue intraluminal lesion of the urinary bladder.  Malignancy least differential.  - heterogeneously enhancing complex 2.2 cm lesion of the right kidney.  - Cyst of the bilateral kidneys.  - hepatic steatosis with focal fatty sparing.  - colonic diverticular disease    CT chest w/o contrast: 1/25/2021  1.  Fatty infiltration of the liver.         2.  No other acute pathology    CT scan A/P: 5/13/2021    - The bladder mass is increased in size compared with CT from August 2020.  Is much carlos of the adjacent aurora-serosal fat may reflect invasion.  - there are several bilateral renal hypodensities, most of which are too small to characterize in terms of attenuation.  This is stable compared with prior exam.  The largest is a cyst in the left lower pole.  One that is indeterminate in the mid to lower pole the right kidney is probably large enough to characterize a cystic or solid by ultrasound.  Consider ultrasound of this lesion is not been evaluated previously.    Labs:  Lab Results   Component Value Date    WBC 7.6 07/27/2023    HGB 13.2 (L) 07/27/2023    HCT 39.6 (L) 07/27/2023    MCV 94.3 07/27/2023    LABPLAT 222 07/27/2023      Platelets   Date Value Ref Range Status   07/27/2023 222 142 - 424 10*3/uL Final     CMP  Sodium   Date Value Ref Range Status   07/27/2023 135 135 - 145 mmol/L Final     Potassium   Date Value Ref Range Status   07/27/2023 4.7 3.6 - 5.2 mmol/L Final     Chloride   Date Value Ref Range Status   07/27/2023 101 100 - 108 mmol/L Final     CO2   Date Value Ref Range Status   07/27/2023 22 21 - 32 mmol/L Final     Glucose   Date Value Ref Range Status   07/27/2023 88 70 - 110 mg/dL Final     BUN   Date Value Ref Range Status   07/27/2023 25 (H) 7 - 18 mg/dL Final     Creatinine   Date Value Ref Range Status   07/27/2023 1.09 0.70 - 1.30 mg/dL Final     Calcium   Date Value Ref Range Status   07/27/2023 8.7 (L) 8.8 - 10.5 mg/dL Final     Total Protein   Date Value Ref Range Status   07/27/2023 7.2 6.4 - 8.2 g/dL Final     Albumin   Date Value Ref Range Status   07/27/2023 3.2 (L) 3.4 - 5.0 g/dL Final     Total Bilirubin   Date Value Ref Range Status   07/27/2023 0.4 0.0 - 1.0 mg/dL Final     Alkaline Phosphatase   Date Value Ref Range Status   07/27/2023 83 46 - 116 U/L Final     AST   Date Value Ref Range Status   07/27/2023 27 15  - 37 U/L Final     ALT   Date Value Ref Range Status   2023 24 12 - 78 U/L Final     Anion Gap   Date Value Ref Range Status   2023 12.0 (H) 3.0 - 11.0 mmol/L Final            Past Medical History:   Diagnosis Date    Allergy     Anxiety     Bladder cancer 2021    Coronary artery disease     Depression     Essential (primary) hypertension     GERD (gastroesophageal reflux disease)     Hypercholesterolemia     Paranoid schizophrenia     Type II diabetes mellitus     Urinary tract infection      Family History   Problem Relation Age of Onset    Coronary artery disease Mother     No Known Problems Father     No Known Problems Sister     No Known Problems Sister     No Known Problems Sister     No Known Problems Sister     No Known Problems Sister     Coronary artery disease Brother     Coronary artery disease Brother     Cancer Maternal Grandmother     Skin cancer Maternal Grandmother     Cancer Maternal Aunt     No Known Problems Maternal Uncle     No Known Problems Maternal Uncle     No Known Problems Maternal Uncle     No Known Problems Maternal Uncle     No Known Problems Maternal Uncle     No Known Problems Maternal Uncle     No Known Problems Maternal Uncle     Colon cancer Neg Hx     Colon polyps Neg Hx     Crohn's disease Neg Hx     Liver disease Neg Hx      Social History     Socioeconomic History    Marital status:    Tobacco Use    Smoking status: Every Day     Packs/day: 1.50     Years: 44.00     Pack years: 66.00     Types: Cigarettes     Start date: 1977     Last attempt to quit: 2020     Years since quittin.5     Passive exposure: Past    Smokeless tobacco: Never    Tobacco comments:     4 cigarettes a day currently 2023. LB   Substance and Sexual Activity    Alcohol use: Not Currently    Drug use: Never     Past Surgical History:   Procedure Laterality Date    ANKLE SURGERY      BLADDER TUMOR EXCISION      2X 2021/ Dec 2022    CORONARY ANGIOPLASTY WITH STENT  PLACEMENT      CYSTOSCOPY      FOOT FRACTURE SURGERY      HAND SURGERY Bilateral     TURBT (TRANSURETHRAL RESECTION OF BLADDER TUMOR)  05/01/2023    WRIST SURGERY           Review of systems:  Review of Systems   Constitutional:  Negative for activity change, appetite change, chills, diaphoresis, fatigue and unexpected weight change.   HENT:  Negative for congestion, facial swelling, hearing loss, mouth sores, trouble swallowing and voice change.    Eyes:  Negative for photophobia, pain, discharge and itching.   Respiratory:  Negative for apnea, cough, choking, chest tightness and shortness of breath.    Cardiovascular:  Negative for chest pain, palpitations and leg swelling.   Gastrointestinal:  Negative for abdominal distention, abdominal pain, anal bleeding and blood in stool.   Endocrine: Negative for cold intolerance, heat intolerance, polydipsia and polyphagia.   Genitourinary:  Positive for hematuria. Negative for difficulty urinating, dysuria and flank pain.   Musculoskeletal:  Negative for arthralgias, back pain, joint swelling, myalgias, neck pain and neck stiffness.   Skin:  Negative for color change, pallor and wound.   Allergic/Immunologic: Negative for environmental allergies, food allergies and immunocompromised state.   Neurological:  Negative for dizziness, seizures, facial asymmetry, speech difficulty, light-headedness, numbness and headaches.   Hematological:  Negative for adenopathy. Does not bruise/bleed easily.   Psychiatric/Behavioral:  Negative for agitation, behavioral problems, confusion, decreased concentration and sleep disturbance.            Physical Exam  Vitals and nursing note reviewed.   Constitutional:       General: He is not in acute distress.     Appearance: Normal appearance. He is not ill-appearing.   HENT:      Head: Normocephalic and atraumatic.      Nose: No congestion or rhinorrhea.   Eyes:      General: No scleral icterus.     Extraocular Movements: Extraocular movements  intact.      Pupils: Pupils are equal, round, and reactive to light.   Cardiovascular:      Rate and Rhythm: Normal rate and regular rhythm.      Pulses: Normal pulses.      Heart sounds: Normal heart sounds. No murmur heard.    No gallop.   Pulmonary:      Effort: Pulmonary effort is normal. No respiratory distress.      Breath sounds: Normal breath sounds. No stridor. No wheezing or rhonchi.   Abdominal:      General: Bowel sounds are normal. There is no distension.      Palpations: There is no mass.      Tenderness: There is no abdominal tenderness. There is no guarding.   Musculoskeletal:         General: No swelling, tenderness, deformity or signs of injury. Normal range of motion.      Cervical back: Normal range of motion and neck supple. No rigidity. No muscular tenderness.      Right lower leg: No edema.      Left lower leg: No edema.   Skin:     General: Skin is warm.      Coloration: Skin is not jaundiced or pale.      Findings: No bruising or lesion.   Neurological:      General: No focal deficit present.      Mental Status: He is alert and oriented to person, place, and time.      Cranial Nerves: No cranial nerve deficit.      Sensory: No sensory deficit.      Motor: No weakness.      Gait: Gait normal.   Psychiatric:         Mood and Affect: Mood normal.         Behavior: Behavior normal.         Thought Content: Thought content normal.      Vitals:    07/27/23 0942   BP: 124/81   Pulse: 82   Resp: 18       Body surface area is 2.02 meters squared.    Assessment/Plan:      ECOG 1    1. Invasive moderately differentiated muscle invasive bladder tumor s/p TURBT 12/21/2020: Stage IIIA at presentation now with Stage IV  [facility] NGS: TP53, ARID1B, KRAS G13D, SMAD4, HIGH TMB noted patient eligible for Keytruda in the future     4/26/23:  Patient presenting to this clinic after a gap of  2 years.  He reports several logistic issues for missing his appointments.  More recently he was seen by Urology for  recurrent bladder cancer and plan for TURBT was made.  However it does not show patient has followed back with Urology.  He also reports getting CT scan done at an outside hospital but I do not have any reports available at this time.  I discussed with him about his several no-shows over the last 2 years and the need to look at dose restaging scan before we decide on our next steps.  I strongly encouraged him to keep his upcoming TURBT appointment with Urology and also showed him the note from urology team where they have attempted to contact him.  == 5/24/23:  Patient underwent repeat TURBT and per Urology note he had multiple recurrences in the bladder which were found to be invasive adenocarcinoma high-grade.  Please see the discussion above where patient has repeatedly missed chemo/radiation appointments in the past and more recently presented after a gap of 2 years.  Patient was seen on 05/11/2023 by Dr. Bro post TURBT and he was referred back to Oncology as he had persistent disease after treatment.  Patient was also discussed in tumor board in I am unsure how we can reliably give any kind of treatment to a patient who does not show up persistently.  I will order a PET scan for restaging and to evaluate the lung nodules noted on recent CT scan.  For a patient who was repeatedly noncompliant and does not show up and disappears during chemotherapy I do not feel I have much to offer at this time.  If he is confirmed stage IV based on PET scan results then palliative gemcitabine and carboplatin followed by avelumab maintenance therapy is a consideration.  PET scan has been ordered for restaging. Also depending on his stage and symptoms-- palliative XRT could have a role but he has had multiple no shows with Rad-onc in the past as well.  == 6/21/23:  PET scan for restaging showed a 2.8 x 5.2 cm mass in the anterior aspect of urinary bladder cavity with foci of amorphous calcification.  Bilateral few scattered  lung nodules with increased FDG activity consistent with metastasis.  Soft tissue fullness with increased FDG activity of left oropharynx.  Patient is status post palliative localized re-irradiation secondary to urinary obstructive symptoms and bleeding.  Discussed with patient in detail future management options for palliative chemo immunotherapy for patients who are cis-platinum eligible and will send prior authorization request for gemcitabine and carboplatin him to be followed by avelumab maintenance therapy.  Will also send Tempus testing at this time  ==07/13/2023 Tempus PDL-1 negative, remaining tempus testing pending.   Pt here today to discuss upcoming chemotherapy, side effect profile, risk versus benefits, and expected outcomes have been discussed today. All questions and concerns answered and consents have been signed.  Labs drawn on 7/11/23. Will start chemotherapy on 7/20/2023.  Port placed, skin wnl, no erythema  == 7/27/23: here today to begin carbo/gemzar. Tempus testing discussed, see above.     History of  St III A Bladder Cancer Diagnosed 12/2020:     -- Discussed with patient further management options for muscle invasive bladder cancer s/p TURBT and the pathology report showing findings with a possible bladder cancer vs colo-rectal cancer primary. If not deemed bladder cancer than no specific role of radiation and will be treated with as metastatic colon cancer.  -- Discussed in TMB meeting 1/21/2020 and consensus decision to obtain C-scopy and if no mass evident on C-scopy, pathology would send for further testing/ ? Second opinion. Referral to GI was placed and he is scheduled to get C-scopy 1/26/2020. C-scopy did not reveal any suspicious lesions which patient delayed getting C-scopy for a long time  -- 3/11/2021: Patient did not present back to clinic until today after his last visit, end of Jan 2021. He also missed his GI appointment which was scheduled urgently at the time. He reports  going through family stressors. I discussed with him and counseled about his non-compliance in coming to appointments. Will obtain restaging scans and will await his C-scopy results. Again counseled not to miss appointments.  -- 5/25/2021:  Patient presents to clinic after a delay of more than 2 months and again does not seem to have a valid reason for causing delay in his own care.  He reports having a recent visit to ER where he was noted to have a urinary tract infection and he also underwent CT scan A/P which just showed increase in size of the bladder mass to approximately 5-6 cm from 4 cm.  I discussed with patient in detail about future management options and also pathology confirming this to be a bladder tumor with pure enteric colonic morphology but no GI primary.  I understand this is a very unreliable patient and might not show up for chemotherapy, hence with the support of our staff we will  involve his family.  == Patient reports that he does not have the resources to undergo any type of radical surgery including a radical cystectomy as he already have transport issues even coming to clinic here and would be unable to go anywhere outside the city.  He would like to pursue with bladder preservation with concurrent chemoradiotherapy. I discussed with him that trimodality therapy (TMT) incorporating maximal TURBT followed by radiation therapy (RT) with concurrent radiosensitizing chemotherapy can be a comparably effective regimen to preserve a functioning bladder in well-selected patients with muscle-invasive bladder cancer.   == Will use 5FU Mitomycin with XRT. He is scheduled to see Rad-onc tomorrow and will need port/IV access prior to starting chemotherapy, pt is unable to get PORT, alternative plan is to get PICC line on Friday and proceed with Gemzar/cisplatin.   --6/18/21: s/p C1D1 &D8 gem/cis with XRT maegan well, mild nausea noted but controlled with prescribed oral antiemetics. Pt has noted less  "hematuria and feels overall "good"   --7/6/21:  Patient is one-week delay for cycle 2 due to transportation difficulties last week.  Patient did maintain most of daily radiation though.  Discussed with patient importance of maintaining chemotherapy appointments and will begin doing weekly labs with Kourtney at [facility].  Patient states feeling pretty good no real side effects current leaf from radiation or chemotherapy to complain of.  Plan is to proceed with cycle 2 day 1 on Thursday.  Return to infusion next Thursday for cycle 2 day 8 and return to clinic in 3 weeks prior to cycle 3  --7/29/21: pt here today to begin cycle 3. He is to complete XRT next week. So far tolerating tx very well. Labs reviewed and no new c/o.   --8/10/21: s/p c1d8, completed xrt this week, doing well, labs reviewed and pt cleared for chemo today.   --9/8/21: pt here in clinic today after 3 week absence due to mother passing. He missed his 4 th of chemo but would like to resume his chemo next week. Labs today reviewed and infusion is notified to call him to schedule C4D1 next week. Pt requesting pain medication refill.   Scans to be scheduled after cycle 4 is completed.   --9/29/21: pt here today after not completing cycle 4 or doing ct scans. Counseled patient regarding need for repeat imaging for active surveillance of disease as well as tapering narcotic pain medication now that he has completed chemo/xrt. Today percocet is changed to Norco 5/325mg #30 and pt understands that this will be his final narcotic prescription provided. Orders placed today for PICC to be pulled at infusion.  He is to RTC in 3 weeks with CT scans.     ==2. CAD:  -- S/p stent placement 03/2021 and follows cardiology  -- Stable    Plan:    Tempus testing  discussed   Ok to begin carbo/gemzar as planned for today     Total time spent in counseling and discussion was more than 60 minutes. I discussed in detail further management options at this time and " discussion on relevant tests, imaging and ancillary tests as part of the management plan.

## 2023-08-02 DIAGNOSIS — C67.8 MALIGNANT NEOPLASM OF OVERLAPPING SITES OF BLADDER: Primary | ICD-10-CM

## 2023-08-03 ENCOUNTER — OFFICE VISIT (OUTPATIENT)
Dept: HEMATOLOGY/ONCOLOGY | Facility: CLINIC | Age: 58
End: 2023-08-03
Payer: MEDICAID

## 2023-08-03 VITALS
WEIGHT: 191.19 LBS | SYSTOLIC BLOOD PRESSURE: 124 MMHG | OXYGEN SATURATION: 96 % | HEIGHT: 68 IN | BODY MASS INDEX: 28.98 KG/M2 | HEART RATE: 91 BPM | DIASTOLIC BLOOD PRESSURE: 79 MMHG | RESPIRATION RATE: 18 BRPM

## 2023-08-03 DIAGNOSIS — D69.6 THROMBOCYTOPENIA: ICD-10-CM

## 2023-08-03 DIAGNOSIS — G89.3 CANCER RELATED PAIN: ICD-10-CM

## 2023-08-03 DIAGNOSIS — C67.8 MALIGNANT NEOPLASM OF OVERLAPPING SITES OF BLADDER: Primary | ICD-10-CM

## 2023-08-03 DIAGNOSIS — T45.1X5A CHEMOTHERAPY-INDUCED NAUSEA AND VOMITING: ICD-10-CM

## 2023-08-03 DIAGNOSIS — R11.2 CHEMOTHERAPY-INDUCED NAUSEA AND VOMITING: ICD-10-CM

## 2023-08-03 LAB
ALBUMIN SERPL BCP-MCNC: 3.1 G/DL (ref 3.4–5)
ALBUMIN/GLOBULIN RATIO: 0.76 RATIO (ref 1.1–1.8)
ALP SERPL-CCNC: 118 U/L (ref 46–116)
ALT SERPL W P-5'-P-CCNC: 32 U/L (ref 12–78)
ANION GAP SERPL CALC-SCNC: 5 MMOL/L (ref 3–11)
AST SERPL-CCNC: 25 U/L (ref 15–37)
BANDS: 8 % (ref 0–5)
BILIRUB SERPL-MCNC: 0.5 MG/DL (ref 0–1)
BUN SERPL-MCNC: 22 MG/DL (ref 7–18)
BUN/CREAT SERPL: 19.29 RATIO (ref 7–18)
CALCIUM SERPL-MCNC: 8.7 MG/DL (ref 8.8–10.5)
CELLS COUNTED: 100
CHLORIDE SERPL-SCNC: 103 MMOL/L (ref 100–108)
CO2 SERPL-SCNC: 27 MMOL/L (ref 21–32)
CREAT SERPL-MCNC: 1.14 MG/DL (ref 0.7–1.3)
ERYTHROCYTE [DISTWIDTH] IN BLOOD BY AUTOMATED COUNT: 14.5 % (ref 12.5–18)
GFR ESTIMATION: > 60
GLOBULIN: 4.1 G/DL (ref 2.3–3.5)
GLUCOSE SERPL-MCNC: 119 MG/DL (ref 70–110)
HCT VFR BLD AUTO: 35 % (ref 42–52)
HGB BLD-MCNC: 11.4 G/DL (ref 14–18)
LYMPHOCYTES NFR BLD: 17 % (ref 25–40)
MCH RBC QN AUTO: 30.2 PG (ref 27–31.2)
MCHC RBC AUTO-ENTMCNC: 32.6 G/DL (ref 31.8–35.4)
MCV RBC AUTO: 92.6 FL (ref 80–97)
METAMYELOCYTES # BLD MANUAL: 2 % (ref 0–0)
MONOCYTES NFR BLD: 3 % (ref 1–15)
NEUTROPHILS # BLD AUTO: 3.2 10*3/UL (ref 1.8–7.7)
NEUTROPHILS NFR BLD: 70 % (ref 37–80)
NUCLEATED RED BLOOD CELLS: 0 %
PLATELETS: 66 10*3/UL (ref 142–424)
POTASSIUM SERPL-SCNC: 4 MMOL/L (ref 3.6–5.2)
PROT SERPL-MCNC: 7.2 G/DL (ref 6.4–8.2)
RBC # BLD AUTO: 3.78 10*6/UL (ref 4.7–6.1)
RBC MORPH BLD: ABNORMAL
SMALL PLATELETS BLD QL SMEAR: ABNORMAL
SODIUM BLD-SCNC: 135 MMOL/L (ref 135–145)
WBC # BLD: 4.1 10*3/UL (ref 4.6–10.2)

## 2023-08-03 PROCEDURE — 99215 OFFICE O/P EST HI 40 MIN: CPT | Mod: S$GLB,,, | Performed by: NURSE PRACTITIONER

## 2023-08-03 PROCEDURE — 4010F ACE/ARB THERAPY RXD/TAKEN: CPT | Mod: CPTII,S$GLB,, | Performed by: NURSE PRACTITIONER

## 2023-08-03 PROCEDURE — 3078F DIAST BP <80 MM HG: CPT | Mod: CPTII,S$GLB,, | Performed by: NURSE PRACTITIONER

## 2023-08-03 PROCEDURE — 1159F PR MEDICATION LIST DOCUMENTED IN MEDICAL RECORD: ICD-10-PCS | Mod: CPTII,S$GLB,, | Performed by: NURSE PRACTITIONER

## 2023-08-03 PROCEDURE — 3074F PR MOST RECENT SYSTOLIC BLOOD PRESSURE < 130 MM HG: ICD-10-PCS | Mod: CPTII,S$GLB,, | Performed by: NURSE PRACTITIONER

## 2023-08-03 PROCEDURE — 1159F MED LIST DOCD IN RCRD: CPT | Mod: CPTII,S$GLB,, | Performed by: NURSE PRACTITIONER

## 2023-08-03 PROCEDURE — 3078F PR MOST RECENT DIASTOLIC BLOOD PRESSURE < 80 MM HG: ICD-10-PCS | Mod: CPTII,S$GLB,, | Performed by: NURSE PRACTITIONER

## 2023-08-03 PROCEDURE — 99215 PR OFFICE/OUTPT VISIT, EST, LEVL V, 40-54 MIN: ICD-10-PCS | Mod: S$GLB,,, | Performed by: NURSE PRACTITIONER

## 2023-08-03 PROCEDURE — 4010F PR ACE/ARB THEARPY RXD/TAKEN: ICD-10-PCS | Mod: CPTII,S$GLB,, | Performed by: NURSE PRACTITIONER

## 2023-08-03 PROCEDURE — 3008F PR BODY MASS INDEX (BMI) DOCUMENTED: ICD-10-PCS | Mod: CPTII,S$GLB,, | Performed by: NURSE PRACTITIONER

## 2023-08-03 PROCEDURE — 3008F BODY MASS INDEX DOCD: CPT | Mod: CPTII,S$GLB,, | Performed by: NURSE PRACTITIONER

## 2023-08-03 PROCEDURE — 3074F SYST BP LT 130 MM HG: CPT | Mod: CPTII,S$GLB,, | Performed by: NURSE PRACTITIONER

## 2023-08-03 NOTE — PROGRESS NOTES
MEDICAL ONCOLOGY FOLLOW UP  NOTE    Patient ID: Chucho Mack is a 58 y.o. male.    Chief Complaint: Bladder cancer    HPI:  Patient is a 55-year-old male with past medical history of anxiety, gastroesophageal reflux disease, hyperlipidemia, paranoid schizophrenia who  recently underwent transurethral resection of large bladder tumor > 8 cm by Urology and pathology was conclusive of bladder cancer.  Please see pathology report below. He presents to medical oncology clinic today for further evaluation. Reports hematuria post TURBT but doing better since then.    BLADDER TUMOR, TRANSURETHRAL RESECTION: 12/23/2020    - ADENOCARCINOMA, INVASIVE, MODERATELY DIFFERENTIATED.   - TUMOR INVADES MUSCULARIS PROPRIA.   - NEGATIVE FOR LYMPHOVASCULAR INVASION.   - SEE COMMENT.   Comment:   To further evaluate the specimen, the following immunohistochemical stains   were performed by [facility] laboratory, on block 1H with   adequate controls and with interpretation as indicated:      CK7:      negative 0030 S>     CK20:      positive      CDX2:      positive      p63:      negative   Primary adenocarcinomas of the bladder  may show significant morphologic and   immunohistochemical overlap with adenocarcinomas from other primary sites.   In this instance, the tumor shows striking morphologic and   immunohistochemical similarity to primary colorectal adenocarcinoma.   Careful correlation with endoscopic and radiographic findings is necessary   for determination of the definitive primary site.     Imaging:      CT Abdomen and pelvis: 10/7/2020    - Partially calcified soft tissue intraluminal lesion of the urinary bladder.  Malignancy least differential.  - heterogeneously enhancing complex 2.2 cm lesion of the right kidney.  - Cyst of the bilateral kidneys.  - hepatic steatosis with focal fatty sparing.  - colonic diverticular disease    CT chest w/o contrast: 1/25/2021  1.  Fatty infiltration of the liver.         2.  No other acute pathology    CT scan A/P: 5/13/2021    - The bladder mass is increased in size compared with CT from August 2020.  Is much carlos of the adjacent aurora-serosal fat may reflect invasion.  - there are several bilateral renal hypodensities, most of which are too small to characterize in terms of attenuation.  This is stable compared with prior exam.  The largest is a cyst in the left lower pole.  One that is indeterminate in the mid to lower pole the right kidney is probably large enough to characterize a cystic or solid by ultrasound.  Consider ultrasound of this lesion is not been evaluated previously.    Labs:  Lab Results   Component Value Date    WBC 4.1 (L) 08/03/2023    HGB 11.4 (L) 08/03/2023    HCT 35.0 (L) 08/03/2023    MCV 92.6 08/03/2023    LABPLAT 66 (L) 08/03/2023      Platelets   Date Value Ref Range Status   08/03/2023 66 (L) 142 - 424 10*3/uL Final     CMP  Sodium   Date Value Ref Range Status   08/03/2023 135 135 - 145 mmol/L Final     Potassium   Date Value Ref Range Status   08/03/2023 4.0 3.6 - 5.2 mmol/L Final     Chloride   Date Value Ref Range Status   08/03/2023 103 100 - 108 mmol/L Final     CO2   Date Value Ref Range Status   08/03/2023 27 21 - 32 mmol/L Final     Glucose   Date Value Ref Range Status   08/03/2023 119 (H) 70 - 110 mg/dL Final     BUN   Date Value Ref Range Status   08/03/2023 22 (H) 7 - 18 mg/dL Final     Creatinine   Date Value Ref Range Status   08/03/2023 1.14 0.70 - 1.30 mg/dL Final     Calcium   Date Value Ref Range Status   08/03/2023 8.7 (L) 8.8 - 10.5 mg/dL Final     Total Protein   Date Value Ref Range Status   08/03/2023 7.2 6.4 - 8.2 g/dL Final     Albumin   Date Value Ref Range Status   08/03/2023 3.1 (L) 3.4 - 5.0 g/dL Final     Total Bilirubin   Date Value Ref Range Status   08/03/2023 0.5 0.0 - 1.0 mg/dL Final     Alkaline Phosphatase   Date Value Ref Range Status   08/03/2023 118 (H) 46 - 116 U/L Final     AST   Date Value Ref Range Status    2023 25 15 - 37 U/L Final     ALT   Date Value Ref Range Status   2023 32 12 - 78 U/L Final     Anion Gap   Date Value Ref Range Status   2023 5.0 3.0 - 11.0 mmol/L Final            Past Medical History:   Diagnosis Date    Allergy     Anxiety     Bladder cancer 2021    Coronary artery disease     Depression     Essential (primary) hypertension     GERD (gastroesophageal reflux disease)     Hypercholesterolemia     Paranoid schizophrenia     Type II diabetes mellitus     Urinary tract infection      Family History   Problem Relation Age of Onset    Coronary artery disease Mother     No Known Problems Father     No Known Problems Sister     No Known Problems Sister     No Known Problems Sister     No Known Problems Sister     No Known Problems Sister     Coronary artery disease Brother     Coronary artery disease Brother     Cancer Maternal Grandmother     Skin cancer Maternal Grandmother     Cancer Maternal Aunt     No Known Problems Maternal Uncle     No Known Problems Maternal Uncle     No Known Problems Maternal Uncle     No Known Problems Maternal Uncle     No Known Problems Maternal Uncle     No Known Problems Maternal Uncle     No Known Problems Maternal Uncle     Colon cancer Neg Hx     Colon polyps Neg Hx     Crohn's disease Neg Hx     Liver disease Neg Hx      Social History     Socioeconomic History    Marital status:    Tobacco Use    Smoking status: Every Day     Current packs/day: 0.00     Average packs/day: 1.5 packs/day for 44.0 years (65.9 ttl pk-yrs)     Types: Cigarettes     Start date: 1977     Last attempt to quit: 2020     Years since quittin.6     Passive exposure: Past    Smokeless tobacco: Never    Tobacco comments:     4 cigarettes a day currently 2023. LB   Substance and Sexual Activity    Alcohol use: Not Currently    Drug use: Never     Past Surgical History:   Procedure Laterality Date    ANKLE SURGERY      BLADDER TUMOR EXCISION       2X 2021/ Dec 2022    CORONARY ANGIOPLASTY WITH STENT PLACEMENT      CYSTOSCOPY      FOOT FRACTURE SURGERY      HAND SURGERY Bilateral     TURBT (TRANSURETHRAL RESECTION OF BLADDER TUMOR)  05/01/2023    WRIST SURGERY           Review of systems:  Review of Systems   Constitutional:  Negative for activity change, appetite change, chills, diaphoresis, fatigue and unexpected weight change.   HENT:  Negative for congestion, facial swelling, hearing loss, mouth sores, trouble swallowing and voice change.    Eyes:  Negative for photophobia, pain, discharge and itching.   Respiratory:  Negative for apnea, cough, choking, chest tightness and shortness of breath.    Cardiovascular:  Negative for chest pain, palpitations and leg swelling.   Gastrointestinal:  Negative for abdominal distention, abdominal pain, anal bleeding and blood in stool.   Endocrine: Negative for cold intolerance, heat intolerance, polydipsia and polyphagia.   Genitourinary:  Positive for hematuria. Negative for difficulty urinating, dysuria and flank pain.   Musculoskeletal:  Negative for arthralgias, back pain, joint swelling, myalgias, neck pain and neck stiffness.   Skin:  Negative for color change, pallor and wound.   Allergic/Immunologic: Negative for environmental allergies, food allergies and immunocompromised state.   Neurological:  Negative for dizziness, seizures, facial asymmetry, speech difficulty, light-headedness, numbness and headaches.   Hematological:  Negative for adenopathy. Does not bruise/bleed easily.   Psychiatric/Behavioral:  Negative for agitation, behavioral problems, confusion, decreased concentration and sleep disturbance.            Physical Exam  Vitals and nursing note reviewed.   Constitutional:       General: He is not in acute distress.     Appearance: Normal appearance. He is not ill-appearing.   HENT:      Head: Normocephalic and atraumatic.      Nose: No congestion or rhinorrhea.   Eyes:      General: No scleral  icterus.     Extraocular Movements: Extraocular movements intact.      Pupils: Pupils are equal, round, and reactive to light.   Cardiovascular:      Rate and Rhythm: Normal rate and regular rhythm.      Pulses: Normal pulses.      Heart sounds: Normal heart sounds. No murmur heard.     No gallop.   Pulmonary:      Effort: Pulmonary effort is normal. No respiratory distress.      Breath sounds: Normal breath sounds. No stridor. No wheezing or rhonchi.   Abdominal:      General: Bowel sounds are normal. There is no distension.      Palpations: There is no mass.      Tenderness: There is no abdominal tenderness. There is no guarding.   Musculoskeletal:         General: No swelling, tenderness, deformity or signs of injury. Normal range of motion.      Cervical back: Normal range of motion and neck supple. No rigidity. No muscular tenderness.      Right lower leg: No edema.      Left lower leg: No edema.   Skin:     General: Skin is warm.      Coloration: Skin is not jaundiced or pale.      Findings: No bruising or lesion.   Neurological:      General: No focal deficit present.      Mental Status: He is alert and oriented to person, place, and time.      Cranial Nerves: No cranial nerve deficit.      Sensory: No sensory deficit.      Motor: No weakness.      Gait: Gait normal.   Psychiatric:         Mood and Affect: Mood normal.         Behavior: Behavior normal.         Thought Content: Thought content normal.      Vitals:    08/03/23 0924   BP: 124/79   Pulse: 91   Resp: 18       Body surface area is 2.04 meters squared.    Assessment/Plan:      ECOG 1    1. Invasive moderately differentiated muscle invasive bladder tumor s/p TURBT 12/21/2020: Stage IIIA at presentation now with Stage IV  [facility] NGS: TP53, ARID1B, KRAS G13D, SMAD4, HIGH TMB noted patient eligible for Keytruda in the future     4/26/23:  Patient presenting to this clinic after a gap of  2 years.  He reports several logistic issues for missing his  appointments.  More recently he was seen by Urology for recurrent bladder cancer and plan for TURBT was made.  However it does not show patient has followed back with Urology.  He also reports getting CT scan done at an outside hospital but I do not have any reports available at this time.  I discussed with him about his several no-shows over the last 2 years and the need to look at dose restaging scan before we decide on our next steps.  I strongly encouraged him to keep his upcoming TURBT appointment with Urology and also showed him the note from urology team where they have attempted to contact him.  == 5/24/23:  Patient underwent repeat TURBT and per Urology note he had multiple recurrences in the bladder which were found to be invasive adenocarcinoma high-grade.  Please see the discussion above where patient has repeatedly missed chemo/radiation appointments in the past and more recently presented after a gap of 2 years.  Patient was seen on 05/11/2023 by Dr. Bro post TURBT and he was referred back to Oncology as he had persistent disease after treatment.  Patient was also discussed in tumor board in I am unsure how we can reliably give any kind of treatment to a patient who does not show up persistently.  I will order a PET scan for restaging and to evaluate the lung nodules noted on recent CT scan.  For a patient who was repeatedly noncompliant and does not show up and disappears during chemotherapy I do not feel I have much to offer at this time.  If he is confirmed stage IV based on PET scan results then palliative gemcitabine and carboplatin followed by avelumab maintenance therapy is a consideration.  PET scan has been ordered for restaging. Also depending on his stage and symptoms-- palliative XRT could have a role but he has had multiple no shows with Rad-onc in the past as well.  == 6/21/23:  PET scan for restaging showed a 2.8 x 5.2 cm mass in the anterior aspect of urinary bladder cavity with  foci of amorphous calcification.  Bilateral few scattered lung nodules with increased FDG activity consistent with metastasis.  Soft tissue fullness with increased FDG activity of left oropharynx.  Patient is status post palliative localized re-irradiation secondary to urinary obstructive symptoms and bleeding.  Discussed with patient in detail future management options for palliative chemo immunotherapy for patients who are cis-platinum eligible and will send prior authorization request for gemcitabine and carboplatin him to be followed by avelumab maintenance therapy.  Will also send Tempus testing at this time  ==07/13/2023 Tempus PDL-1 negative, remaining tempus testing pending.   Pt here today to discuss upcoming chemotherapy, side effect profile, risk versus benefits, and expected outcomes have been discussed today. All questions and concerns answered and consents have been signed.  Labs drawn on 7/11/23. Will start chemotherapy on 7/20/2023.  Port placed, skin wnl, no erythema  == 7/27/23: here today to begin carbo/gemzar. Tempus testing discussed, see above.     History of  St III A Bladder Cancer Diagnosed 12/2020:     -- Discussed with patient further management options for muscle invasive bladder cancer s/p TURBT and the pathology report showing findings with a possible bladder cancer vs colo-rectal cancer primary. If not deemed bladder cancer than no specific role of radiation and will be treated with as metastatic colon cancer.  -- Discussed in TMB meeting 1/21/2020 and consensus decision to obtain C-scopy and if no mass evident on C-scopy, pathology would send for further testing/ ? Second opinion. Referral to GI was placed and he is scheduled to get C-scopy 1/26/2020. C-scopy did not reveal any suspicious lesions which patient delayed getting C-scopy for a long time  -- 3/11/2021: Patient did not present back to clinic until today after his last visit, end of Jan 2021. He also missed his GI  appointment which was scheduled urgently at the time. He reports going through family stressors. I discussed with him and counseled about his non-compliance in coming to appointments. Will obtain restaging scans and will await his C-scopy results. Again counseled not to miss appointments.  -- 5/25/2021:  Patient presents to clinic after a delay of more than 2 months and again does not seem to have a valid reason for causing delay in his own care.  He reports having a recent visit to ER where he was noted to have a urinary tract infection and he also underwent CT scan A/P which just showed increase in size of the bladder mass to approximately 5-6 cm from 4 cm.  I discussed with patient in detail about future management options and also pathology confirming this to be a bladder tumor with pure enteric colonic morphology but no GI primary.  I understand this is a very unreliable patient and might not show up for chemotherapy, hence with the support of our staff we will  involve his family.  == Patient reports that he does not have the resources to undergo any type of radical surgery including a radical cystectomy as he already have transport issues even coming to clinic here and would be unable to go anywhere outside the city.  He would like to pursue with bladder preservation with concurrent chemoradiotherapy. I discussed with him that trimodality therapy (TMT) incorporating maximal TURBT followed by radiation therapy (RT) with concurrent radiosensitizing chemotherapy can be a comparably effective regimen to preserve a functioning bladder in well-selected patients with muscle-invasive bladder cancer.   == Will use 5FU Mitomycin with XRT. He is scheduled to see Rad-onc tomorrow and will need port/IV access prior to starting chemotherapy, pt is unable to get PORT, alternative plan is to get PICC line on Friday and proceed with Gemzar/cisplatin.   --6/18/21: s/p C1D1 &D8 gem/cis with XRT maegan well, mild nausea noted but  "controlled with prescribed oral antiemetics. Pt has noted less hematuria and feels overall "good"   --7/6/21:  Patient is one-week delay for cycle 2 due to transportation difficulties last week.  Patient did maintain most of daily radiation though.  Discussed with patient importance of maintaining chemotherapy appointments and will begin doing weekly labs with Kourtney at [facility].  Patient states feeling pretty good no real side effects current leaf from radiation or chemotherapy to complain of.  Plan is to proceed with cycle 2 day 1 on Thursday.  Return to infusion next Thursday for cycle 2 day 8 and return to clinic in 3 weeks prior to cycle 3  --7/29/21: pt here today to begin cycle 3. He is to complete XRT next week. So far tolerating tx very well. Labs reviewed and no new c/o.   --8/10/21: s/p c1d8, completed xrt this week, doing well, labs reviewed and pt cleared for chemo today.   --9/8/21: pt here in clinic today after 3 week absence due to mother passing. He missed his 4 th of chemo but would like to resume his chemo next week. Labs today reviewed and infusion is notified to call him to schedule C4D1 next week. Pt requesting pain medication refill.   Scans to be scheduled after cycle 4 is completed.   --9/29/21: pt here today after not completing cycle 4 or doing ct scans. Counseled patient regarding need for repeat imaging for active surveillance of disease as well as tapering narcotic pain medication now that he has completed chemo/xrt. Today percocet is changed to Norco 5/325mg #30 and pt understands that this will be his final narcotic prescription provided. Orders placed today for PICC to be pulled at infusion.  He is to RTC in 3 weeks with CT scans.   ==08/03/2023: Patient s/p first carboplatin/gemcitabine cycle.  Reports had some nausea relieved with ondansetron.  Plt 66, will hold 1 week and see patient back with repeat labs    ==2. CAD:  -- S/p stent placement 03/2021 and follows " cardiology  -- Stable    Plan:    Tempus testing  discussed   Ok to begin carbo/gemzar as planned for today     Total time spent in counseling and discussion was more than 60 minutes. I discussed in detail further management options at this time and discussion on relevant tests, imaging and ancillary tests as part of the management plan.

## 2023-08-09 DIAGNOSIS — C67.8 MALIGNANT NEOPLASM OF OVERLAPPING SITES OF BLADDER: Primary | ICD-10-CM

## 2023-08-24 ENCOUNTER — OFFICE VISIT (OUTPATIENT)
Dept: HEMATOLOGY/ONCOLOGY | Facility: CLINIC | Age: 58
End: 2023-08-24
Payer: MEDICAID

## 2023-08-24 VITALS
BODY MASS INDEX: 24.71 KG/M2 | HEIGHT: 68 IN | WEIGHT: 163 LBS | DIASTOLIC BLOOD PRESSURE: 90 MMHG | SYSTOLIC BLOOD PRESSURE: 151 MMHG | OXYGEN SATURATION: 97 % | HEART RATE: 79 BPM | RESPIRATION RATE: 18 BRPM

## 2023-08-24 DIAGNOSIS — C67.8 MALIGNANT NEOPLASM OF OVERLAPPING SITES OF BLADDER: Primary | ICD-10-CM

## 2023-08-24 DIAGNOSIS — G89.3 CANCER RELATED PAIN: ICD-10-CM

## 2023-08-24 DIAGNOSIS — R31.9 HEMATURIA, UNSPECIFIED TYPE: ICD-10-CM

## 2023-08-24 LAB
ALBUMIN SERPL BCP-MCNC: 3.5 G/DL (ref 3.4–5)
ALBUMIN/GLOBULIN RATIO: 0.88 RATIO (ref 1.1–1.8)
ALP SERPL-CCNC: 90 U/L (ref 46–116)
ALT SERPL W P-5'-P-CCNC: 18 U/L (ref 12–78)
ANION GAP SERPL CALC-SCNC: 8 MMOL/L (ref 3–11)
AST SERPL-CCNC: 21 U/L (ref 15–37)
BANDS: 1 % (ref 0–5)
BILIRUB SERPL-MCNC: 0.3 MG/DL (ref 0–1)
BUN SERPL-MCNC: 18 MG/DL (ref 7–18)
BUN/CREAT SERPL: 12.32 RATIO (ref 7–18)
CALCIUM SERPL-MCNC: 10 MG/DL (ref 8.8–10.5)
CELLS COUNTED: 100
CHLORIDE SERPL-SCNC: 103 MMOL/L (ref 100–108)
CO2 SERPL-SCNC: 31 MMOL/L (ref 21–32)
CREAT SERPL-MCNC: 1.46 MG/DL (ref 0.7–1.3)
EOSINOPHIL NFR BLD: 4 % (ref 1–3)
ERYTHROCYTE [DISTWIDTH] IN BLOOD BY AUTOMATED COUNT: 15.8 % (ref 12.5–18)
GFR ESTIMATION: 50
GLOBULIN: 4 G/DL (ref 2.3–3.5)
GLUCOSE SERPL-MCNC: 101 MG/DL (ref 70–110)
HCT VFR BLD AUTO: 36.1 % (ref 42–52)
HGB BLD-MCNC: 11.8 G/DL (ref 14–18)
LYMPHOCYTES NFR BLD: 21 % (ref 25–40)
MCH RBC QN AUTO: 30.3 PG (ref 27–31.2)
MCHC RBC AUTO-ENTMCNC: 32.7 G/DL (ref 31.8–35.4)
MCV RBC AUTO: 92.8 FL (ref 80–97)
MONOCYTES NFR BLD: 15 % (ref 1–15)
NEUTROPHILS # BLD AUTO: 4.1 10*3/UL (ref 1.8–7.7)
NEUTROPHILS NFR BLD: 59 % (ref 37–80)
NUCLEATED RED BLOOD CELLS: 0 %
PLATELETS: 358 10*3/UL (ref 142–424)
POTASSIUM SERPL-SCNC: 4 MMOL/L (ref 3.6–5.2)
PROT SERPL-MCNC: 7.5 G/DL (ref 6.4–8.2)
RBC # BLD AUTO: 3.89 10*6/UL (ref 4.7–6.1)
RBC MORPH BLD: ABNORMAL
SMALL PLATELETS BLD QL SMEAR: ADEQUATE
SODIUM BLD-SCNC: 142 MMOL/L (ref 135–145)
WBC # BLD: 6.8 10*3/UL (ref 4.6–10.2)

## 2023-08-24 PROCEDURE — 99215 PR OFFICE/OUTPT VISIT, EST, LEVL V, 40-54 MIN: ICD-10-PCS | Mod: S$GLB,,, | Performed by: NURSE PRACTITIONER

## 2023-08-24 PROCEDURE — 1159F MED LIST DOCD IN RCRD: CPT | Mod: CPTII,S$GLB,, | Performed by: NURSE PRACTITIONER

## 2023-08-24 PROCEDURE — 3077F PR MOST RECENT SYSTOLIC BLOOD PRESSURE >= 140 MM HG: ICD-10-PCS | Mod: CPTII,S$GLB,, | Performed by: NURSE PRACTITIONER

## 2023-08-24 PROCEDURE — 4010F PR ACE/ARB THEARPY RXD/TAKEN: ICD-10-PCS | Mod: CPTII,S$GLB,, | Performed by: NURSE PRACTITIONER

## 2023-08-24 PROCEDURE — 3077F SYST BP >= 140 MM HG: CPT | Mod: CPTII,S$GLB,, | Performed by: NURSE PRACTITIONER

## 2023-08-24 PROCEDURE — 3080F PR MOST RECENT DIASTOLIC BLOOD PRESSURE >= 90 MM HG: ICD-10-PCS | Mod: CPTII,S$GLB,, | Performed by: NURSE PRACTITIONER

## 2023-08-24 PROCEDURE — 3008F PR BODY MASS INDEX (BMI) DOCUMENTED: ICD-10-PCS | Mod: CPTII,S$GLB,, | Performed by: NURSE PRACTITIONER

## 2023-08-24 PROCEDURE — 99215 OFFICE O/P EST HI 40 MIN: CPT | Mod: S$GLB,,, | Performed by: NURSE PRACTITIONER

## 2023-08-24 PROCEDURE — 4010F ACE/ARB THERAPY RXD/TAKEN: CPT | Mod: CPTII,S$GLB,, | Performed by: NURSE PRACTITIONER

## 2023-08-24 PROCEDURE — 3080F DIAST BP >= 90 MM HG: CPT | Mod: CPTII,S$GLB,, | Performed by: NURSE PRACTITIONER

## 2023-08-24 PROCEDURE — 3008F BODY MASS INDEX DOCD: CPT | Mod: CPTII,S$GLB,, | Performed by: NURSE PRACTITIONER

## 2023-08-24 PROCEDURE — 1159F PR MEDICATION LIST DOCUMENTED IN MEDICAL RECORD: ICD-10-PCS | Mod: CPTII,S$GLB,, | Performed by: NURSE PRACTITIONER

## 2023-08-24 RX ORDER — HYDROCODONE BITARTRATE AND ACETAMINOPHEN 10; 325 MG/1; MG/1
1 TABLET ORAL EVERY 8 HOURS PRN
Qty: 60 TABLET | Refills: 0 | Status: SHIPPED | OUTPATIENT
Start: 2023-08-24 | End: 2023-09-25 | Stop reason: SDUPTHER

## 2023-08-24 NOTE — PROGRESS NOTES
MEDICAL ONCOLOGY FOLLOW UP  NOTE    Patient ID: Chucho Mack is a 58 y.o. male.    Chief Complaint: Bladder cancer    HPI:  Patient is a 55-year-old male with past medical history of anxiety, gastroesophageal reflux disease, hyperlipidemia, paranoid schizophrenia who  recently underwent transurethral resection of large bladder tumor > 8 cm by Urology and pathology was conclusive of bladder cancer.  Please see pathology report below. He presents to medical oncology clinic today for further evaluation. Reports hematuria post TURBT but doing better since then.    BLADDER TUMOR, TRANSURETHRAL RESECTION: 12/23/2020    - ADENOCARCINOMA, INVASIVE, MODERATELY DIFFERENTIATED.   - TUMOR INVADES MUSCULARIS PROPRIA.   - NEGATIVE FOR LYMPHOVASCULAR INVASION.   - SEE COMMENT.   Comment:   To further evaluate the specimen, the following immunohistochemical stains   were performed by [facility] laboratory, on block 1H with   adequate controls and with interpretation as indicated:      CK7:      negative 0030 S>     CK20:      positive      CDX2:      positive      p63:      negative   Primary adenocarcinomas of the bladder  may show significant morphologic and   immunohistochemical overlap with adenocarcinomas from other primary sites.   In this instance, the tumor shows striking morphologic and   immunohistochemical similarity to primary colorectal adenocarcinoma.   Careful correlation with endoscopic and radiographic findings is necessary   for determination of the definitive primary site.     Imaging:      CT Abdomen and pelvis: 10/7/2020    - Partially calcified soft tissue intraluminal lesion of the urinary bladder.  Malignancy least differential.  - heterogeneously enhancing complex 2.2 cm lesion of the right kidney.  - Cyst of the bilateral kidneys.  - hepatic steatosis with focal fatty sparing.  - colonic diverticular disease    CT chest w/o contrast: 1/25/2021  1.  Fatty infiltration of the liver.         2.  No other acute pathology    CT scan A/P: 5/13/2021    - The bladder mass is increased in size compared with CT from August 2020.  Is much carlos of the adjacent aurora-serosal fat may reflect invasion.  - there are several bilateral renal hypodensities, most of which are too small to characterize in terms of attenuation.  This is stable compared with prior exam.  The largest is a cyst in the left lower pole.  One that is indeterminate in the mid to lower pole the right kidney is probably large enough to characterize a cystic or solid by ultrasound.  Consider ultrasound of this lesion is not been evaluated previously.    Labs:  Lab Results   Component Value Date    WBC 6.8 08/24/2023    HGB 11.8 (L) 08/24/2023    HCT 36.1 (L) 08/24/2023    MCV 92.8 08/24/2023    LABPLAT 358 08/24/2023      Platelets   Date Value Ref Range Status   08/24/2023 358 142 - 424 10*3/uL Final     CMP  Sodium   Date Value Ref Range Status   08/24/2023 142 135 - 145 mmol/L Final     Potassium   Date Value Ref Range Status   08/24/2023 4.0 3.6 - 5.2 mmol/L Final     Chloride   Date Value Ref Range Status   08/24/2023 103 100 - 108 mmol/L Final     CO2   Date Value Ref Range Status   08/24/2023 31 21 - 32 mmol/L Final     Glucose   Date Value Ref Range Status   08/24/2023 101 70 - 110 mg/dL Final     BUN   Date Value Ref Range Status   08/24/2023 18 7 - 18 mg/dL Final     Creatinine   Date Value Ref Range Status   08/24/2023 1.46 (H) 0.70 - 1.30 mg/dL Final     Calcium   Date Value Ref Range Status   08/24/2023 10.0 8.8 - 10.5 mg/dL Final     Total Protein   Date Value Ref Range Status   08/24/2023 7.5 6.4 - 8.2 g/dL Final     Albumin   Date Value Ref Range Status   08/24/2023 3.5 3.4 - 5.0 g/dL Final     Total Bilirubin   Date Value Ref Range Status   08/24/2023 0.3 0.0 - 1.0 mg/dL Final     Alkaline Phosphatase   Date Value Ref Range Status   08/24/2023 90 46 - 116 U/L Final     AST   Date Value Ref Range Status   08/24/2023 21 15 - 37  U/L Final     ALT   Date Value Ref Range Status   2023 18 12 - 78 U/L Final     Anion Gap   Date Value Ref Range Status   2023 8.0 3.0 - 11.0 mmol/L Final            Past Medical History:   Diagnosis Date    Allergy     Anxiety     Bladder cancer 2021    Coronary artery disease     Depression     Essential (primary) hypertension     GERD (gastroesophageal reflux disease)     Hypercholesterolemia     Paranoid schizophrenia     Type II diabetes mellitus     Urinary tract infection      Family History   Problem Relation Age of Onset    Coronary artery disease Mother     No Known Problems Father     No Known Problems Sister     No Known Problems Sister     No Known Problems Sister     No Known Problems Sister     No Known Problems Sister     Coronary artery disease Brother     Coronary artery disease Brother     Cancer Maternal Grandmother     Skin cancer Maternal Grandmother     Cancer Maternal Aunt     No Known Problems Maternal Uncle     No Known Problems Maternal Uncle     No Known Problems Maternal Uncle     No Known Problems Maternal Uncle     No Known Problems Maternal Uncle     No Known Problems Maternal Uncle     No Known Problems Maternal Uncle     Colon cancer Neg Hx     Colon polyps Neg Hx     Crohn's disease Neg Hx     Liver disease Neg Hx      Social History     Socioeconomic History    Marital status:    Tobacco Use    Smoking status: Every Day     Current packs/day: 0.00     Average packs/day: 1.5 packs/day for 44.0 years (65.9 ttl pk-yrs)     Types: Cigarettes     Start date: 1977     Last attempt to quit: 2020     Years since quittin.6     Passive exposure: Past    Smokeless tobacco: Never    Tobacco comments:     4 cigarettes a day currently 2023. LB   Substance and Sexual Activity    Alcohol use: Not Currently    Drug use: Never     Past Surgical History:   Procedure Laterality Date    ANKLE SURGERY      BLADDER TUMOR EXCISION      2X 2021/ Dec 2022     CORONARY ANGIOPLASTY WITH STENT PLACEMENT      CYSTOSCOPY      FOOT FRACTURE SURGERY      HAND SURGERY Bilateral     TURBT (TRANSURETHRAL RESECTION OF BLADDER TUMOR)  05/01/2023    WRIST SURGERY           Review of systems:  Review of Systems   Constitutional:  Negative for activity change, appetite change, chills, diaphoresis, fatigue and unexpected weight change.   HENT:  Negative for congestion, facial swelling, hearing loss, mouth sores, trouble swallowing and voice change.    Eyes:  Negative for photophobia, pain, discharge and itching.   Respiratory:  Negative for apnea, cough, choking, chest tightness and shortness of breath.    Cardiovascular:  Negative for chest pain, palpitations and leg swelling.   Gastrointestinal:  Negative for abdominal distention, abdominal pain, anal bleeding and blood in stool.   Endocrine: Negative for cold intolerance, heat intolerance, polydipsia and polyphagia.   Genitourinary:  Positive for hematuria. Negative for difficulty urinating, dysuria and flank pain.   Musculoskeletal:  Negative for arthralgias, back pain, joint swelling, myalgias, neck pain and neck stiffness.   Skin:  Negative for color change, pallor and wound.   Allergic/Immunologic: Negative for environmental allergies, food allergies and immunocompromised state.   Neurological:  Negative for dizziness, seizures, facial asymmetry, speech difficulty, light-headedness, numbness and headaches.   Hematological:  Negative for adenopathy. Does not bruise/bleed easily.   Psychiatric/Behavioral:  Negative for agitation, behavioral problems, confusion, decreased concentration and sleep disturbance.            Physical Exam  Vitals and nursing note reviewed.   Constitutional:       General: He is not in acute distress.     Appearance: Normal appearance. He is not ill-appearing.   HENT:      Head: Normocephalic and atraumatic.      Nose: No congestion or rhinorrhea.   Eyes:      General: No scleral icterus.     Extraocular  Movements: Extraocular movements intact.      Pupils: Pupils are equal, round, and reactive to light.   Cardiovascular:      Rate and Rhythm: Normal rate and regular rhythm.      Pulses: Normal pulses.      Heart sounds: Normal heart sounds. No murmur heard.     No gallop.   Pulmonary:      Effort: Pulmonary effort is normal. No respiratory distress.      Breath sounds: Normal breath sounds. No stridor. No wheezing or rhonchi.   Abdominal:      General: Bowel sounds are normal. There is no distension.      Palpations: There is no mass.      Tenderness: There is no abdominal tenderness. There is no guarding.   Musculoskeletal:         General: No swelling, tenderness, deformity or signs of injury. Normal range of motion.      Cervical back: Normal range of motion and neck supple. No rigidity. No muscular tenderness.      Right lower leg: No edema.      Left lower leg: No edema.   Skin:     General: Skin is warm.      Coloration: Skin is not jaundiced or pale.      Findings: No bruising or lesion.   Neurological:      General: No focal deficit present.      Mental Status: He is alert and oriented to person, place, and time.      Cranial Nerves: No cranial nerve deficit.      Sensory: No sensory deficit.      Motor: No weakness.      Gait: Gait normal.   Psychiatric:         Mood and Affect: Mood normal.         Behavior: Behavior normal.         Thought Content: Thought content normal.      Vitals:    08/24/23 1028   BP: (!) 151/90   Pulse: 79   Resp: 18       Body surface area is 1.88 meters squared.    Assessment/Plan:      ECOG 1    1. Invasive moderately differentiated muscle invasive bladder tumor s/p TURBT 12/21/2020: Stage IIIA at presentation now with Stage IV  [facility] NGS: TP53, ARID1B, KRAS G13D, SMAD4, HIGH TMB noted patient eligible for Keytruda in the future     4/26/23:  Patient presenting to this clinic after a gap of  2 years.  He reports several logistic issues for missing his appointments.  More  recently he was seen by Urology for recurrent bladder cancer and plan for TURBT was made.  However it does not show patient has followed back with Urology.  He also reports getting CT scan done at an outside hospital but I do not have any reports available at this time.  I discussed with him about his several no-shows over the last 2 years and the need to look at dose restaging scan before we decide on our next steps.  I strongly encouraged him to keep his upcoming TURBT appointment with Urology and also showed him the note from urology team where they have attempted to contact him.  == 5/24/23:  Patient underwent repeat TURBT and per Urology note he had multiple recurrences in the bladder which were found to be invasive adenocarcinoma high-grade.  Please see the discussion above where patient has repeatedly missed chemo/radiation appointments in the past and more recently presented after a gap of 2 years.  Patient was seen on 05/11/2023 by Dr. Bro post TURBT and he was referred back to Oncology as he had persistent disease after treatment.  Patient was also discussed in tumor board in I am unsure how we can reliably give any kind of treatment to a patient who does not show up persistently.  I will order a PET scan for restaging and to evaluate the lung nodules noted on recent CT scan.  For a patient who was repeatedly noncompliant and does not show up and disappears during chemotherapy I do not feel I have much to offer at this time.  If he is confirmed stage IV based on PET scan results then palliative gemcitabine and carboplatin followed by avelumab maintenance therapy is a consideration.  PET scan has been ordered for restaging. Also depending on his stage and symptoms-- palliative XRT could have a role but he has had multiple no shows with Rad-onc in the past as well.  == 6/21/23:  PET scan for restaging showed a 2.8 x 5.2 cm mass in the anterior aspect of urinary bladder cavity with foci of amorphous  calcification.  Bilateral few scattered lung nodules with increased FDG activity consistent with metastasis.  Soft tissue fullness with increased FDG activity of left oropharynx.  Patient is status post palliative localized re-irradiation secondary to urinary obstructive symptoms and bleeding.  Discussed with patient in detail future management options for palliative chemo immunotherapy for patients who are cis-platinum eligible and will send prior authorization request for gemcitabine and carboplatin him to be followed by avelumab maintenance therapy.  Will also send Tempus testing at this time  ==07/13/2023 Tempus PDL-1 negative, remaining tempus testing pending.   Pt here today to discuss upcoming chemotherapy, side effect profile, risk versus benefits, and expected outcomes have been discussed today. All questions and concerns answered and consents have been signed.  Labs drawn on 7/11/23. Will start chemotherapy on 7/20/2023.  Port placed, skin wnl, no erythema  == 7/27/23: here today to begin carbo/gemzar. Tempus testing discussed, see above.     History of  St III A Bladder Cancer Diagnosed 12/2020:     -- Discussed with patient further management options for muscle invasive bladder cancer s/p TURBT and the pathology report showing findings with a possible bladder cancer vs colo-rectal cancer primary. If not deemed bladder cancer than no specific role of radiation and will be treated with as metastatic colon cancer.  -- Discussed in TMB meeting 1/21/2020 and consensus decision to obtain C-scopy and if no mass evident on C-scopy, pathology would send for further testing/ ? Second opinion. Referral to GI was placed and he is scheduled to get C-scopy 1/26/2020. C-scopy did not reveal any suspicious lesions which patient delayed getting C-scopy for a long time  -- 3/11/2021: Patient did not present back to clinic until today after his last visit, end of Jan 2021. He also missed his GI appointment which was  scheduled urgently at the time. He reports going through family stressors. I discussed with him and counseled about his non-compliance in coming to appointments. Will obtain restaging scans and will await his C-scopy results. Again counseled not to miss appointments.  -- 5/25/2021:  Patient presents to clinic after a delay of more than 2 months and again does not seem to have a valid reason for causing delay in his own care.  He reports having a recent visit to ER where he was noted to have a urinary tract infection and he also underwent CT scan A/P which just showed increase in size of the bladder mass to approximately 5-6 cm from 4 cm.  I discussed with patient in detail about future management options and also pathology confirming this to be a bladder tumor with pure enteric colonic morphology but no GI primary.  I understand this is a very unreliable patient and might not show up for chemotherapy, hence with the support of our staff we will  involve his family.  == Patient reports that he does not have the resources to undergo any type of radical surgery including a radical cystectomy as he already have transport issues even coming to clinic here and would be unable to go anywhere outside the city.  He would like to pursue with bladder preservation with concurrent chemoradiotherapy. I discussed with him that trimodality therapy (TMT) incorporating maximal TURBT followed by radiation therapy (RT) with concurrent radiosensitizing chemotherapy can be a comparably effective regimen to preserve a functioning bladder in well-selected patients with muscle-invasive bladder cancer.   == Will use 5FU Mitomycin with XRT. He is scheduled to see Rad-onc tomorrow and will need port/IV access prior to starting chemotherapy, pt is unable to get PORT, alternative plan is to get PICC line on Friday and proceed with Gemzar/cisplatin.   --6/18/21: s/p C1D1 &D8 gem/cis with XRT maegan well, mild nausea noted but controlled with  "prescribed oral antiemetics. Pt has noted less hematuria and feels overall "good"   --7/6/21:  Patient is one-week delay for cycle 2 due to transportation difficulties last week.  Patient did maintain most of daily radiation though.  Discussed with patient importance of maintaining chemotherapy appointments and will begin doing weekly labs with Kourtney at [facility].  Patient states feeling pretty good no real side effects current leaf from radiation or chemotherapy to complain of.  Plan is to proceed with cycle 2 day 1 on Thursday.  Return to infusion next Thursday for cycle 2 day 8 and return to clinic in 3 weeks prior to cycle 3  --7/29/21: pt here today to begin cycle 3. He is to complete XRT next week. So far tolerating tx very well. Labs reviewed and no new c/o.   --8/10/21: s/p c1d8, completed xrt this week, doing well, labs reviewed and pt cleared for chemo today.   --9/8/21: pt here in clinic today after 3 week absence due to mother passing. He missed his 4 th of chemo but would like to resume his chemo next week. Labs today reviewed and infusion is notified to call him to schedule C4D1 next week. Pt requesting pain medication refill.   Scans to be scheduled after cycle 4 is completed.   --9/29/21: pt here today after not completing cycle 4 or doing ct scans. Counseled patient regarding need for repeat imaging for active surveillance of disease as well as tapering narcotic pain medication now that he has completed chemo/xrt. Today percocet is changed to Norco 5/325mg #30 and pt understands that this will be his final narcotic prescription provided. Orders placed today for PICC to be pulled at infusion.  He is to RTC in 3 weeks with CT scans.   ==08/03/2023: Patient s/p first carboplatin/gemcitabine cycle.  Reports had some nausea relieved with ondansetron.  Plt 66, will hold 1 week and see patient back with repeat labs  ==08/24/2023: Patient received once cycle Carboplatin/Gemcitabine on 7/27/2023, " second cycle held due to thrombocytopenia with rtc appt made for a week later but patient was unable to make appt due to transportation issues. Pt presented to clinic as walk up with reports of hematuria, sensation of incomplete emptying of bladder. States he saw pcp and was told related to bladder cancer, no bladder infection. SrCr increased from 1.1 to 1.46. He is taking 800 mg ibuprofen 1-2x daily for pain.  Patient also has questions regarding how long he will be on chemotherapy and wanted to be done with all oncologic treatment and in remission by October 2023.  I had conversation with patient that he has stage IV disease and goal of treatment is not curative and patient will be on some form of treatment indefinitely.       ==2. CAD:  -- S/p stent placement 03/2021 and follows cardiology  -- Stable    Plan:  Refer to Dr. Bro for evaluation of hematuria  Stop all NSAIDS  RTC 1 week cbc cmp prior if SrCr improved will resume chemotherapy.  [facility] has been notified also    Total time spent in counseling and discussion was more than 60 minutes. I discussed in detail further management options at this time and discussion on relevant tests, imaging and ancillary tests as part of the management plan.

## 2023-08-28 NOTE — PROGRESS NOTES
MEDICAL ONCOLOGY FOLLOW UP  NOTE    Patient ID: Cuhcho Mack is a 58 y.o. male.    Chief Complaint: Bladder cancer    HPI:  Patient is a 55-year-old male with past medical history of anxiety, gastroesophageal reflux disease, hyperlipidemia, paranoid schizophrenia who  recently underwent transurethral resection of large bladder tumor > 8 cm by Urology and pathology was conclusive of bladder cancer.  Please see pathology report below. He presents to medical oncology clinic today for further evaluation. Reports hematuria post TURBT but doing better since then.    BLADDER TUMOR, TRANSURETHRAL RESECTION: 12/23/2020    - ADENOCARCINOMA, INVASIVE, MODERATELY DIFFERENTIATED.   - TUMOR INVADES MUSCULARIS PROPRIA.   - NEGATIVE FOR LYMPHOVASCULAR INVASION.   - SEE COMMENT.   Comment:   To further evaluate the specimen, the following immunohistochemical stains   were performed by [facility] laboratory, on block 1H with   adequate controls and with interpretation as indicated:      CK7:      negative 0030 S>     CK20:      positive      CDX2:      positive      p63:      negative   Primary adenocarcinomas of the bladder  may show significant morphologic and   immunohistochemical overlap with adenocarcinomas from other primary sites.   In this instance, the tumor shows striking morphologic and   immunohistochemical similarity to primary colorectal adenocarcinoma.   Careful correlation with endoscopic and radiographic findings is necessary   for determination of the definitive primary site.     Imaging:      CT Abdomen and pelvis: 10/7/2020    - Partially calcified soft tissue intraluminal lesion of the urinary bladder.  Malignancy least differential.  - heterogeneously enhancing complex 2.2 cm lesion of the right kidney.  - Cyst of the bilateral kidneys.  - hepatic steatosis with focal fatty sparing.  - colonic diverticular disease    CT chest w/o contrast: 1/25/2021  1.  Fatty infiltration of the liver.         2.  No other acute pathology    CT scan A/P: 5/13/2021    - The bladder mass is increased in size compared with CT from August 2020.  Is much carlos of the adjacent aurora-serosal fat may reflect invasion.  - there are several bilateral renal hypodensities, most of which are too small to characterize in terms of attenuation.  This is stable compared with prior exam.  The largest is a cyst in the left lower pole.  One that is indeterminate in the mid to lower pole the right kidney is probably large enough to characterize a cystic or solid by ultrasound.  Consider ultrasound of this lesion is not been evaluated previously.    Labs:  Lab Results   Component Value Date    WBC 6.8 08/24/2023    HGB 11.8 (L) 08/24/2023    HCT 36.1 (L) 08/24/2023    MCV 92.8 08/24/2023    LABPLAT 358 08/24/2023      Platelets   Date Value Ref Range Status   08/24/2023 358 142 - 424 10*3/uL Final     CMP  Sodium   Date Value Ref Range Status   08/24/2023 142 135 - 145 mmol/L Final     Potassium   Date Value Ref Range Status   08/24/2023 4.0 3.6 - 5.2 mmol/L Final     Chloride   Date Value Ref Range Status   08/24/2023 103 100 - 108 mmol/L Final     CO2   Date Value Ref Range Status   08/24/2023 31 21 - 32 mmol/L Final     Glucose   Date Value Ref Range Status   08/24/2023 101 70 - 110 mg/dL Final     BUN   Date Value Ref Range Status   08/24/2023 18 7 - 18 mg/dL Final     Creatinine   Date Value Ref Range Status   08/24/2023 1.46 (H) 0.70 - 1.30 mg/dL Final     Calcium   Date Value Ref Range Status   08/24/2023 10.0 8.8 - 10.5 mg/dL Final     Total Protein   Date Value Ref Range Status   08/24/2023 7.5 6.4 - 8.2 g/dL Final     Albumin   Date Value Ref Range Status   08/24/2023 3.5 3.4 - 5.0 g/dL Final     Total Bilirubin   Date Value Ref Range Status   08/24/2023 0.3 0.0 - 1.0 mg/dL Final     Alkaline Phosphatase   Date Value Ref Range Status   08/24/2023 90 46 - 116 U/L Final     AST   Date Value Ref Range Status   08/24/2023 21 15 - 37  U/L Final     ALT   Date Value Ref Range Status   2023 18 12 - 78 U/L Final     Anion Gap   Date Value Ref Range Status   2023 8.0 3.0 - 11.0 mmol/L Final            Past Medical History:   Diagnosis Date    Allergy     Anxiety     Bladder cancer 2021    Coronary artery disease     Depression     Essential (primary) hypertension     GERD (gastroesophageal reflux disease)     Hypercholesterolemia     Paranoid schizophrenia     Type II diabetes mellitus     Urinary tract infection      Family History   Problem Relation Age of Onset    Coronary artery disease Mother     No Known Problems Father     No Known Problems Sister     No Known Problems Sister     No Known Problems Sister     No Known Problems Sister     No Known Problems Sister     Coronary artery disease Brother     Coronary artery disease Brother     Cancer Maternal Grandmother     Skin cancer Maternal Grandmother     Cancer Maternal Aunt     No Known Problems Maternal Uncle     No Known Problems Maternal Uncle     No Known Problems Maternal Uncle     No Known Problems Maternal Uncle     No Known Problems Maternal Uncle     No Known Problems Maternal Uncle     No Known Problems Maternal Uncle     Colon cancer Neg Hx     Colon polyps Neg Hx     Crohn's disease Neg Hx     Liver disease Neg Hx      Social History     Socioeconomic History    Marital status:    Tobacco Use    Smoking status: Every Day     Current packs/day: 0.00     Average packs/day: 1.5 packs/day for 44.0 years (65.9 ttl pk-yrs)     Types: Cigarettes     Start date: 1977     Last attempt to quit: 2020     Years since quittin.6     Passive exposure: Past    Smokeless tobacco: Never    Tobacco comments:     4 cigarettes a day currently 2023. LB   Substance and Sexual Activity    Alcohol use: Not Currently    Drug use: Never     Past Surgical History:   Procedure Laterality Date    ANKLE SURGERY      BLADDER TUMOR EXCISION      2X 2021/ Dec 2022     CORONARY ANGIOPLASTY WITH STENT PLACEMENT      CYSTOSCOPY      FOOT FRACTURE SURGERY      HAND SURGERY Bilateral     TURBT (TRANSURETHRAL RESECTION OF BLADDER TUMOR)  05/01/2023    WRIST SURGERY           Review of systems:  Review of Systems   Constitutional:  Negative for activity change, appetite change, chills, diaphoresis, fatigue and unexpected weight change.   HENT:  Negative for congestion, facial swelling, hearing loss, mouth sores, trouble swallowing and voice change.    Eyes:  Negative for photophobia, pain, discharge and itching.   Respiratory:  Negative for apnea, cough, choking, chest tightness and shortness of breath.    Cardiovascular:  Negative for chest pain, palpitations and leg swelling.   Gastrointestinal:  Negative for abdominal distention, abdominal pain, anal bleeding and blood in stool.   Endocrine: Negative for cold intolerance, heat intolerance, polydipsia and polyphagia.   Genitourinary:  Positive for hematuria. Negative for difficulty urinating, dysuria and flank pain.   Musculoskeletal:  Negative for arthralgias, back pain, joint swelling, myalgias, neck pain and neck stiffness.   Skin:  Negative for color change, pallor and wound.   Allergic/Immunologic: Negative for environmental allergies, food allergies and immunocompromised state.   Neurological:  Negative for dizziness, seizures, facial asymmetry, speech difficulty, light-headedness, numbness and headaches.   Hematological:  Negative for adenopathy. Does not bruise/bleed easily.   Psychiatric/Behavioral:  Negative for agitation, behavioral problems, confusion, decreased concentration and sleep disturbance.            Physical Exam  Vitals and nursing note reviewed.   Constitutional:       General: He is not in acute distress.     Appearance: Normal appearance. He is not ill-appearing.   HENT:      Head: Normocephalic and atraumatic.      Nose: No congestion or rhinorrhea.   Eyes:      General: No scleral icterus.     Extraocular  Movements: Extraocular movements intact.      Pupils: Pupils are equal, round, and reactive to light.   Cardiovascular:      Rate and Rhythm: Normal rate and regular rhythm.      Pulses: Normal pulses.      Heart sounds: Normal heart sounds. No murmur heard.     No gallop.   Pulmonary:      Effort: Pulmonary effort is normal. No respiratory distress.      Breath sounds: Normal breath sounds. No stridor. No wheezing or rhonchi.   Abdominal:      General: Bowel sounds are normal. There is no distension.      Palpations: There is no mass.      Tenderness: There is no abdominal tenderness. There is no guarding.   Musculoskeletal:         General: No swelling, tenderness, deformity or signs of injury. Normal range of motion.      Cervical back: Normal range of motion and neck supple. No rigidity. No muscular tenderness.      Right lower leg: No edema.      Left lower leg: No edema.   Skin:     General: Skin is warm.      Coloration: Skin is not jaundiced or pale.      Findings: No bruising or lesion.   Neurological:      General: No focal deficit present.      Mental Status: He is alert and oriented to person, place, and time.      Cranial Nerves: No cranial nerve deficit.      Sensory: No sensory deficit.      Motor: No weakness.      Gait: Gait normal.   Psychiatric:         Mood and Affect: Mood normal.         Behavior: Behavior normal.         Thought Content: Thought content normal.      There were no vitals filed for this visit.      There is no height or weight on file to calculate BSA.    Assessment/Plan:      ECOG 1    1. Invasive moderately differentiated muscle invasive bladder tumor s/p TURBT 12/21/2020: Stage IIIA at presentation now with Stage IV  [facility] NGS: TP53, ARID1B, KRAS G13D, SMAD4, HIGH TMB noted patient eligible for Keytruda in the future     4/26/23:  Patient presenting to this clinic after a gap of  2 years.  He reports several logistic issues for missing his appointments.  More recently he was  seen by Urology for recurrent bladder cancer and plan for TURBT was made.  However it does not show patient has followed back with Urology.  He also reports getting CT scan done at an outside hospital but I do not have any reports available at this time.  I discussed with him about his several no-shows over the last 2 years and the need to look at dose restaging scan before we decide on our next steps.  I strongly encouraged him to keep his upcoming TURBT appointment with Urology and also showed him the note from urology team where they have attempted to contact him.  == 5/24/23:  Patient underwent repeat TURBT and per Urology note he had multiple recurrences in the bladder which were found to be invasive adenocarcinoma high-grade.  Please see the discussion above where patient has repeatedly missed chemo/radiation appointments in the past and more recently presented after a gap of 2 years.  Patient was seen on 05/11/2023 by Dr. Bro post TURBT and he was referred back to Oncology as he had persistent disease after treatment.  Patient was also discussed in tumor board in I am unsure how we can reliably give any kind of treatment to a patient who does not show up persistently.  I will order a PET scan for restaging and to evaluate the lung nodules noted on recent CT scan.  For a patient who was repeatedly noncompliant and does not show up and disappears during chemotherapy I do not feel I have much to offer at this time.  If he is confirmed stage IV based on PET scan results then palliative gemcitabine and carboplatin followed by avelumab maintenance therapy is a consideration.  PET scan has been ordered for restaging. Also depending on his stage and symptoms-- palliative XRT could have a role but he has had multiple no shows with Rad-onc in the past as well.  == 6/21/23:  PET scan for restaging showed a 2.8 x 5.2 cm mass in the anterior aspect of urinary bladder cavity with foci of amorphous calcification.   Bilateral few scattered lung nodules with increased FDG activity consistent with metastasis.  Soft tissue fullness with increased FDG activity of left oropharynx.  Patient is status post palliative localized re-irradiation secondary to urinary obstructive symptoms and bleeding.  Discussed with patient in detail future management options for palliative chemo immunotherapy for patients who are cis-platinum eligible and will send prior authorization request for gemcitabine and carboplatin him to be followed by avelumab maintenance therapy.  Will also send Tempus testing at this time  ==07/13/2023 Tempus PDL-1 negative, remaining tempus testing pending.   Pt here today to discuss upcoming chemotherapy, side effect profile, risk versus benefits, and expected outcomes have been discussed today. All questions and concerns answered and consents have been signed.  Labs drawn on 7/11/23. Will start chemotherapy on 7/20/2023.  Port placed, skin wnl, no erythema  == 7/27/23: here today to begin carbo/gemzar. Tempus testing discussed, see above.     History of  St III A Bladder Cancer Diagnosed 12/2020:     -- Discussed with patient further management options for muscle invasive bladder cancer s/p TURBT and the pathology report showing findings with a possible bladder cancer vs colo-rectal cancer primary. If not deemed bladder cancer than no specific role of radiation and will be treated with as metastatic colon cancer.  -- Discussed in TMB meeting 1/21/2020 and consensus decision to obtain C-scopy and if no mass evident on C-scopy, pathology would send for further testing/ ? Second opinion. Referral to GI was placed and he is scheduled to get C-scopy 1/26/2020. C-scopy did not reveal any suspicious lesions which patient delayed getting C-scopy for a long time  -- 3/11/2021: Patient did not present back to clinic until today after his last visit, end of Jan 2021. He also missed his GI appointment which was scheduled urgently at  the time. He reports going through family stressors. I discussed with him and counseled about his non-compliance in coming to appointments. Will obtain restaging scans and will await his C-scopy results. Again counseled not to miss appointments.  -- 5/25/2021:  Patient presents to clinic after a delay of more than 2 months and again does not seem to have a valid reason for causing delay in his own care.  He reports having a recent visit to ER where he was noted to have a urinary tract infection and he also underwent CT scan A/P which just showed increase in size of the bladder mass to approximately 5-6 cm from 4 cm.  I discussed with patient in detail about future management options and also pathology confirming this to be a bladder tumor with pure enteric colonic morphology but no GI primary.  I understand this is a very unreliable patient and might not show up for chemotherapy, hence with the support of our staff we will  involve his family.  == Patient reports that he does not have the resources to undergo any type of radical surgery including a radical cystectomy as he already have transport issues even coming to clinic here and would be unable to go anywhere outside the city.  He would like to pursue with bladder preservation with concurrent chemoradiotherapy. I discussed with him that trimodality therapy (TMT) incorporating maximal TURBT followed by radiation therapy (RT) with concurrent radiosensitizing chemotherapy can be a comparably effective regimen to preserve a functioning bladder in well-selected patients with muscle-invasive bladder cancer.   == Will use 5FU Mitomycin with XRT. He is scheduled to see Rad-onc tomorrow and will need port/IV access prior to starting chemotherapy, pt is unable to get PORT, alternative plan is to get PICC line on Friday and proceed with Gemzar/cisplatin.   --6/18/21: s/p C1D1 &D8 gem/cis with XRT maegan well, mild nausea noted but controlled with prescribed oral antiemetics.  "Pt has noted less hematuria and feels overall "good"   --7/6/21:  Patient is one-week delay for cycle 2 due to transportation difficulties last week.  Patient did maintain most of daily radiation though.  Discussed with patient importance of maintaining chemotherapy appointments and will begin doing weekly labs with Kourtney at [facility].  Patient states feeling pretty good no real side effects current leaf from radiation or chemotherapy to complain of.  Plan is to proceed with cycle 2 day 1 on Thursday.  Return to infusion next Thursday for cycle 2 day 8 and return to clinic in 3 weeks prior to cycle 3  --7/29/21: pt here today to begin cycle 3. He is to complete XRT next week. So far tolerating tx very well. Labs reviewed and no new c/o.   --8/10/21: s/p c1d8, completed xrt this week, doing well, labs reviewed and pt cleared for chemo today.   --9/8/21: pt here in clinic today after 3 week absence due to mother passing. He missed his 4 th of chemo but would like to resume his chemo next week. Labs today reviewed and infusion is notified to call him to schedule C4D1 next week. Pt requesting pain medication refill.   Scans to be scheduled after cycle 4 is completed.   --9/29/21: pt here today after not completing cycle 4 or doing ct scans. Counseled patient regarding need for repeat imaging for active surveillance of disease as well as tapering narcotic pain medication now that he has completed chemo/xrt. Today percocet is changed to Norco 5/325mg #30 and pt understands that this will be his final narcotic prescription provided. Orders placed today for PICC to be pulled at infusion.  He is to RTC in 3 weeks with CT scans.   ==08/03/2023: Patient s/p first carboplatin/gemcitabine cycle.  Reports had some nausea relieved with ondansetron.  Plt 66, will hold 1 week and see patient back with repeat labs  ==08/24/2023: Patient received once cycle Carboplatin/Gemcitabine on 7/27/2023, second cycle held due to " thrombocytopenia with rtc appt made for a week later but patient was unable to make appt due to transportation issues. Pt presented to clinic as walk up with reports of hematuria, sensation of incomplete emptying of bladder. States he saw pcp and was told related to bladder cancer, no bladder infection. SrCr increased from 1.1 to 1.46. He is taking 800 mg ibuprofen 1-2x daily for pain.  Patient also has questions regarding how long he will be on chemotherapy and wanted to be done with all oncologic treatment and in remission by October 2023.  I had conversation with patient that he has stage IV disease and goal of treatment is not curative and patient will be on some form of treatment indefinitely.   ==08/31/2023: SrCr Improved, has returned to baseline. Pt received C1D1 Carboplatin/Gemcitabine then did not return to clinic for any further treatment.  Will resume treatment with new cycle.  Reinforced education, no nsaids, encouraged po fluids 6-8 glasses water per day.        ==2. CAD:  -- S/p stent placement 03/2021 and follows cardiology  -- Stable    Plan:  Rtc 1 week cbc cmp will be due for C2D8 Carbo/Howard    Total time spent in counseling and discussion was more than 60 minutes. I discussed in detail further management options at this time and discussion on relevant tests, imaging and ancillary tests as part of the management plan.

## 2023-08-31 ENCOUNTER — OFFICE VISIT (OUTPATIENT)
Dept: HEMATOLOGY/ONCOLOGY | Facility: CLINIC | Age: 58
End: 2023-08-31
Payer: MEDICAID

## 2023-08-31 VITALS
DIASTOLIC BLOOD PRESSURE: 85 MMHG | HEIGHT: 68 IN | RESPIRATION RATE: 18 BRPM | SYSTOLIC BLOOD PRESSURE: 144 MMHG | BODY MASS INDEX: 28.42 KG/M2 | WEIGHT: 187.5 LBS | HEART RATE: 78 BPM | OXYGEN SATURATION: 97 %

## 2023-08-31 DIAGNOSIS — C67.8 MALIGNANT NEOPLASM OF OVERLAPPING SITES OF BLADDER: Primary | ICD-10-CM

## 2023-08-31 DIAGNOSIS — G89.3 CANCER RELATED PAIN: ICD-10-CM

## 2023-08-31 LAB
ALBUMIN SERPL BCP-MCNC: 3.3 G/DL (ref 3.4–5)
ALBUMIN/GLOBULIN RATIO: 0.73 RATIO (ref 1.1–1.8)
ALP SERPL-CCNC: 78 U/L (ref 46–116)
ALT SERPL W P-5'-P-CCNC: 16 U/L (ref 12–78)
ANION GAP SERPL CALC-SCNC: 11 MMOL/L (ref 3–11)
AST SERPL-CCNC: 20 U/L (ref 15–37)
BASOPHILS NFR BLD: 0.1 % (ref 0–3)
BILIRUB SERPL-MCNC: 0.2 MG/DL (ref 0–1)
BUN SERPL-MCNC: 9 MG/DL (ref 7–18)
BUN/CREAT SERPL: 7.62 RATIO (ref 7–18)
CALCIUM SERPL-MCNC: 8.7 MG/DL (ref 8.8–10.5)
CHLORIDE SERPL-SCNC: 101 MMOL/L (ref 100–108)
CO2 SERPL-SCNC: 26 MMOL/L (ref 21–32)
CREAT SERPL-MCNC: 1.18 MG/DL (ref 0.7–1.3)
EOSINOPHIL NFR BLD: 2 % (ref 1–3)
ERYTHROCYTE [DISTWIDTH] IN BLOOD BY AUTOMATED COUNT: 16.5 % (ref 12.5–18)
GFR ESTIMATION: > 60
GLOBULIN: 4.5 G/DL (ref 2.3–3.5)
GLUCOSE SERPL-MCNC: 80 MG/DL (ref 70–110)
HCT VFR BLD AUTO: 36 % (ref 42–52)
HGB BLD-MCNC: 11.6 G/DL (ref 14–18)
LYMPHOCYTES NFR BLD: 16.6 % (ref 25–40)
MCH RBC QN AUTO: 30.2 PG (ref 27–31.2)
MCHC RBC AUTO-ENTMCNC: 32.2 G/DL (ref 31.8–35.4)
MCV RBC AUTO: 93.8 FL (ref 80–97)
MONOCYTES NFR BLD: 10.1 % (ref 1–15)
NEUTROPHILS # BLD AUTO: 6.09 10*3/UL (ref 1.8–7.7)
NEUTROPHILS NFR BLD: 70.6 % (ref 37–80)
NUCLEATED RED BLOOD CELLS: 0 %
PLATELETS: 275 10*3/UL (ref 142–424)
POTASSIUM SERPL-SCNC: 3.7 MMOL/L (ref 3.6–5.2)
PROT SERPL-MCNC: 7.8 G/DL (ref 6.4–8.2)
RBC # BLD AUTO: 3.84 10*6/UL (ref 4.7–6.1)
SODIUM BLD-SCNC: 138 MMOL/L (ref 135–145)
WBC # BLD: 8.6 10*3/UL (ref 4.6–10.2)

## 2023-08-31 PROCEDURE — 3008F PR BODY MASS INDEX (BMI) DOCUMENTED: ICD-10-PCS | Mod: CPTII,S$GLB,, | Performed by: NURSE PRACTITIONER

## 2023-08-31 PROCEDURE — 3008F BODY MASS INDEX DOCD: CPT | Mod: CPTII,S$GLB,, | Performed by: NURSE PRACTITIONER

## 2023-08-31 PROCEDURE — 99215 OFFICE O/P EST HI 40 MIN: CPT | Mod: S$GLB,,, | Performed by: NURSE PRACTITIONER

## 2023-08-31 PROCEDURE — 1159F MED LIST DOCD IN RCRD: CPT | Mod: CPTII,S$GLB,, | Performed by: NURSE PRACTITIONER

## 2023-08-31 PROCEDURE — 99215 PR OFFICE/OUTPT VISIT, EST, LEVL V, 40-54 MIN: ICD-10-PCS | Mod: S$GLB,,, | Performed by: NURSE PRACTITIONER

## 2023-08-31 PROCEDURE — 4010F ACE/ARB THERAPY RXD/TAKEN: CPT | Mod: CPTII,S$GLB,, | Performed by: NURSE PRACTITIONER

## 2023-08-31 PROCEDURE — 3077F PR MOST RECENT SYSTOLIC BLOOD PRESSURE >= 140 MM HG: ICD-10-PCS | Mod: CPTII,S$GLB,, | Performed by: NURSE PRACTITIONER

## 2023-08-31 PROCEDURE — 3079F DIAST BP 80-89 MM HG: CPT | Mod: CPTII,S$GLB,, | Performed by: NURSE PRACTITIONER

## 2023-08-31 PROCEDURE — 3079F PR MOST RECENT DIASTOLIC BLOOD PRESSURE 80-89 MM HG: ICD-10-PCS | Mod: CPTII,S$GLB,, | Performed by: NURSE PRACTITIONER

## 2023-08-31 PROCEDURE — 1159F PR MEDICATION LIST DOCUMENTED IN MEDICAL RECORD: ICD-10-PCS | Mod: CPTII,S$GLB,, | Performed by: NURSE PRACTITIONER

## 2023-08-31 PROCEDURE — 3077F SYST BP >= 140 MM HG: CPT | Mod: CPTII,S$GLB,, | Performed by: NURSE PRACTITIONER

## 2023-08-31 PROCEDURE — 4010F PR ACE/ARB THEARPY RXD/TAKEN: ICD-10-PCS | Mod: CPTII,S$GLB,, | Performed by: NURSE PRACTITIONER

## 2023-08-31 RX ORDER — SODIUM CHLORIDE 0.9 % (FLUSH) 0.9 %
10 SYRINGE (ML) INJECTION
Status: CANCELLED | OUTPATIENT
Start: 2023-08-31

## 2023-08-31 RX ORDER — HEPARIN 100 UNIT/ML
500 SYRINGE INTRAVENOUS
Status: CANCELLED | OUTPATIENT
Start: 2023-08-31

## 2023-09-07 ENCOUNTER — OFFICE VISIT (OUTPATIENT)
Dept: HEMATOLOGY/ONCOLOGY | Facility: CLINIC | Age: 58
End: 2023-09-07
Payer: MEDICAID

## 2023-09-07 VITALS
BODY MASS INDEX: 26.58 KG/M2 | SYSTOLIC BLOOD PRESSURE: 130 MMHG | RESPIRATION RATE: 18 BRPM | OXYGEN SATURATION: 98 % | WEIGHT: 175.38 LBS | HEART RATE: 80 BPM | HEIGHT: 68 IN | DIASTOLIC BLOOD PRESSURE: 78 MMHG

## 2023-09-07 DIAGNOSIS — R31.9 HEMATURIA, UNSPECIFIED TYPE: ICD-10-CM

## 2023-09-07 DIAGNOSIS — C67.8 MALIGNANT NEOPLASM OF OVERLAPPING SITES OF BLADDER: Primary | ICD-10-CM

## 2023-09-07 DIAGNOSIS — G89.3 CANCER RELATED PAIN: ICD-10-CM

## 2023-09-07 DIAGNOSIS — T45.1X5A CHEMOTHERAPY-INDUCED NAUSEA AND VOMITING: ICD-10-CM

## 2023-09-07 DIAGNOSIS — R11.2 CHEMOTHERAPY-INDUCED NAUSEA AND VOMITING: ICD-10-CM

## 2023-09-07 LAB
ALBUMIN SERPL BCP-MCNC: 3.5 G/DL (ref 3.4–5)
ALBUMIN/GLOBULIN RATIO: 0.88 RATIO (ref 1.1–1.8)
ALP SERPL-CCNC: 156 U/L (ref 46–116)
ALT SERPL W P-5'-P-CCNC: 30 U/L (ref 12–78)
ANION GAP SERPL CALC-SCNC: 8 MMOL/L (ref 3–11)
AST SERPL-CCNC: 25 U/L (ref 15–37)
BANDS: 10 % (ref 0–5)
BILIRUB SERPL-MCNC: 0.4 MG/DL (ref 0–1)
BUN SERPL-MCNC: 19 MG/DL (ref 7–18)
BUN/CREAT SERPL: 14.96 RATIO (ref 7–18)
CALCIUM SERPL-MCNC: 8.8 MG/DL (ref 8.8–10.5)
CELLS COUNTED: 100
CHLORIDE SERPL-SCNC: 99 MMOL/L (ref 100–108)
CO2 SERPL-SCNC: 29 MMOL/L (ref 21–32)
CREAT SERPL-MCNC: 1.27 MG/DL (ref 0.7–1.3)
EOSINOPHIL NFR BLD: 1 % (ref 1–3)
ERYTHROCYTE [DISTWIDTH] IN BLOOD BY AUTOMATED COUNT: 16.3 % (ref 12.5–18)
GFR ESTIMATION: 58
GLOBULIN: 4 G/DL (ref 2.3–3.5)
GLUCOSE SERPL-MCNC: 90 MG/DL (ref 70–110)
HCT VFR BLD AUTO: 36 % (ref 42–52)
HGB BLD-MCNC: 11.8 G/DL (ref 14–18)
LYMPHOCYTES NFR BLD: 9 % (ref 25–40)
MCH RBC QN AUTO: 30.6 PG (ref 27–31.2)
MCHC RBC AUTO-ENTMCNC: 32.8 G/DL (ref 31.8–35.4)
MCV RBC AUTO: 93.5 FL (ref 80–97)
MONOCYTES NFR BLD: 6 % (ref 1–15)
NEUTROPHILS # BLD AUTO: 16.8 10*3/UL (ref 1.8–7.7)
NEUTROPHILS NFR BLD: 74 % (ref 37–80)
NUCLEATED RED BLOOD CELLS: 0 %
PLATELETS: 153 10*3/UL (ref 142–424)
POTASSIUM SERPL-SCNC: 4.4 MMOL/L (ref 3.6–5.2)
PROT SERPL-MCNC: 7.5 G/DL (ref 6.4–8.2)
RBC # BLD AUTO: 3.85 10*6/UL (ref 4.7–6.1)
RBC MORPH BLD: ABNORMAL
SMALL PLATELETS BLD QL SMEAR: ADEQUATE
SODIUM BLD-SCNC: 136 MMOL/L (ref 135–145)
WBC # BLD: 20 10*3/UL (ref 4.6–10.2)

## 2023-09-07 PROCEDURE — 99215 OFFICE O/P EST HI 40 MIN: CPT | Mod: S$GLB,,, | Performed by: NURSE PRACTITIONER

## 2023-09-07 PROCEDURE — 1159F PR MEDICATION LIST DOCUMENTED IN MEDICAL RECORD: ICD-10-PCS | Mod: CPTII,S$GLB,, | Performed by: NURSE PRACTITIONER

## 2023-09-07 PROCEDURE — 4010F PR ACE/ARB THEARPY RXD/TAKEN: ICD-10-PCS | Mod: CPTII,S$GLB,, | Performed by: NURSE PRACTITIONER

## 2023-09-07 PROCEDURE — 3075F SYST BP GE 130 - 139MM HG: CPT | Mod: CPTII,S$GLB,, | Performed by: NURSE PRACTITIONER

## 2023-09-07 PROCEDURE — 3008F BODY MASS INDEX DOCD: CPT | Mod: CPTII,S$GLB,, | Performed by: NURSE PRACTITIONER

## 2023-09-07 PROCEDURE — 3008F PR BODY MASS INDEX (BMI) DOCUMENTED: ICD-10-PCS | Mod: CPTII,S$GLB,, | Performed by: NURSE PRACTITIONER

## 2023-09-07 PROCEDURE — 1159F MED LIST DOCD IN RCRD: CPT | Mod: CPTII,S$GLB,, | Performed by: NURSE PRACTITIONER

## 2023-09-07 PROCEDURE — 3078F PR MOST RECENT DIASTOLIC BLOOD PRESSURE < 80 MM HG: ICD-10-PCS | Mod: CPTII,S$GLB,, | Performed by: NURSE PRACTITIONER

## 2023-09-07 PROCEDURE — 99215 PR OFFICE/OUTPT VISIT, EST, LEVL V, 40-54 MIN: ICD-10-PCS | Mod: S$GLB,,, | Performed by: NURSE PRACTITIONER

## 2023-09-07 PROCEDURE — 4010F ACE/ARB THERAPY RXD/TAKEN: CPT | Mod: CPTII,S$GLB,, | Performed by: NURSE PRACTITIONER

## 2023-09-07 PROCEDURE — 3078F DIAST BP <80 MM HG: CPT | Mod: CPTII,S$GLB,, | Performed by: NURSE PRACTITIONER

## 2023-09-07 PROCEDURE — 3075F PR MOST RECENT SYSTOLIC BLOOD PRESS GE 130-139MM HG: ICD-10-PCS | Mod: CPTII,S$GLB,, | Performed by: NURSE PRACTITIONER

## 2023-09-07 RX ORDER — ONDANSETRON HYDROCHLORIDE 8 MG/1
8 TABLET, FILM COATED ORAL EVERY 8 HOURS PRN
Qty: 60 TABLET | Refills: 6 | Status: SHIPPED | OUTPATIENT
Start: 2023-09-07 | End: 2023-10-13 | Stop reason: SDUPTHER

## 2023-09-07 RX ORDER — MORPHINE SULFATE 15 MG/1
15 TABLET, FILM COATED, EXTENDED RELEASE ORAL 2 TIMES DAILY
Qty: 60 TABLET | Refills: 0 | Status: SHIPPED | OUTPATIENT
Start: 2023-09-07

## 2023-09-07 RX ORDER — ONDANSETRON 2 MG/ML
8 INJECTION INTRAMUSCULAR; INTRAVENOUS
Status: CANCELLED | OUTPATIENT
Start: 2023-09-07

## 2023-09-07 RX ORDER — HEPARIN 100 UNIT/ML
500 SYRINGE INTRAVENOUS
Status: CANCELLED | OUTPATIENT
Start: 2023-09-07

## 2023-09-07 RX ORDER — SODIUM CHLORIDE 0.9 % (FLUSH) 0.9 %
10 SYRINGE (ML) INJECTION
Status: CANCELLED | OUTPATIENT
Start: 2023-09-07

## 2023-09-07 NOTE — PROGRESS NOTES
MEDICAL ONCOLOGY FOLLOW UP  NOTE    Patient ID: Chucho Mack is a 58 y.o. male.    Chief Complaint: Bladder cancer    HPI:  Patient is a 58-year-old male with past medical history of anxiety, gastroesophageal reflux disease, hyperlipidemia, paranoid schizophrenia who  recently underwent transurethral resection of large bladder tumor > 8 cm by Urology and pathology was conclusive of bladder cancer.  Please see pathology report below. He presents to medical oncology clinic today for further evaluation. Reports hematuria post TURBT but doing better since then.    BLADDER TUMOR, TRANSURETHRAL RESECTION: 12/23/2020    - ADENOCARCINOMA, INVASIVE, MODERATELY DIFFERENTIATED.   - TUMOR INVADES MUSCULARIS PROPRIA.   - NEGATIVE FOR LYMPHOVASCULAR INVASION.   - SEE COMMENT.   Comment:   To further evaluate the specimen, the following immunohistochemical stains   were performed by [facility] laboratory, on block 1H with   adequate controls and with interpretation as indicated:      CK7:      negative 0030 S>     CK20:      positive      CDX2:      positive      p63:      negative   Primary adenocarcinomas of the bladder  may show significant morphologic and   immunohistochemical overlap with adenocarcinomas from other primary sites.   In this instance, the tumor shows striking morphologic and   immunohistochemical similarity to primary colorectal adenocarcinoma.   Careful correlation with endoscopic and radiographic findings is necessary   for determination of the definitive primary site.     Imaging:      CT Abdomen and pelvis: 10/7/2020    - Partially calcified soft tissue intraluminal lesion of the urinary bladder.  Malignancy least differential.  - heterogeneously enhancing complex 2.2 cm lesion of the right kidney.  - Cyst of the bilateral kidneys.  - hepatic steatosis with focal fatty sparing.  - colonic diverticular disease    CT chest w/o contrast: 1/25/2021  1.  Fatty infiltration of the liver.         2.  No other acute pathology    CT scan A/P: 5/13/2021    - The bladder mass is increased in size compared with CT from August 2020.  Is much carlos of the adjacent aurora-serosal fat may reflect invasion.  - there are several bilateral renal hypodensities, most of which are too small to characterize in terms of attenuation.  This is stable compared with prior exam.  The largest is a cyst in the left lower pole.  One that is indeterminate in the mid to lower pole the right kidney is probably large enough to characterize a cystic or solid by ultrasound.  Consider ultrasound of this lesion is not been evaluated previously.    Labs:  Lab Results   Component Value Date    WBC 20.0 (H) 09/07/2023    HGB 11.8 (L) 09/07/2023    HCT 36.0 (L) 09/07/2023    MCV 93.5 09/07/2023    LABPLAT 153 09/07/2023      Platelets   Date Value Ref Range Status   09/07/2023 153 142 - 424 10*3/uL Final     CMP  Sodium   Date Value Ref Range Status   09/07/2023 136 135 - 145 mmol/L Final     Potassium   Date Value Ref Range Status   09/07/2023 4.4 3.6 - 5.2 mmol/L Final     Chloride   Date Value Ref Range Status   09/07/2023 99 (L) 100 - 108 mmol/L Final     CO2   Date Value Ref Range Status   09/07/2023 29 21 - 32 mmol/L Final     Glucose   Date Value Ref Range Status   09/07/2023 90 70 - 110 mg/dL Final     BUN   Date Value Ref Range Status   09/07/2023 19 (H) 7 - 18 mg/dL Final     Creatinine   Date Value Ref Range Status   09/07/2023 1.27 0.70 - 1.30 mg/dL Final     Calcium   Date Value Ref Range Status   09/07/2023 8.8 8.8 - 10.5 mg/dL Final     Total Protein   Date Value Ref Range Status   09/07/2023 7.5 6.4 - 8.2 g/dL Final     Albumin   Date Value Ref Range Status   09/07/2023 3.5 3.4 - 5.0 g/dL Final     Total Bilirubin   Date Value Ref Range Status   09/07/2023 0.4 0.0 - 1.0 mg/dL Final     Alkaline Phosphatase   Date Value Ref Range Status   09/07/2023 156 (H) 46 - 116 U/L Final     AST   Date Value Ref Range Status   09/07/2023  25 15 - 37 U/L Final     ALT   Date Value Ref Range Status   2023 30 12 - 78 U/L Final     Anion Gap   Date Value Ref Range Status   2023 8.0 3.0 - 11.0 mmol/L Final            Past Medical History:   Diagnosis Date    Allergy     Anxiety     Bladder cancer 2021    Coronary artery disease     Depression     Essential (primary) hypertension     GERD (gastroesophageal reflux disease)     Hypercholesterolemia     Paranoid schizophrenia     Type II diabetes mellitus     Urinary tract infection      Family History   Problem Relation Age of Onset    Coronary artery disease Mother     No Known Problems Father     No Known Problems Sister     No Known Problems Sister     No Known Problems Sister     No Known Problems Sister     No Known Problems Sister     Coronary artery disease Brother     Coronary artery disease Brother     Cancer Maternal Grandmother     Skin cancer Maternal Grandmother     Cancer Maternal Aunt     No Known Problems Maternal Uncle     No Known Problems Maternal Uncle     No Known Problems Maternal Uncle     No Known Problems Maternal Uncle     No Known Problems Maternal Uncle     No Known Problems Maternal Uncle     No Known Problems Maternal Uncle     Colon cancer Neg Hx     Colon polyps Neg Hx     Crohn's disease Neg Hx     Liver disease Neg Hx      Social History     Socioeconomic History    Marital status:    Tobacco Use    Smoking status: Every Day     Current packs/day: 0.00     Average packs/day: 1.5 packs/day for 44.0 years (65.9 ttl pk-yrs)     Types: Cigarettes     Start date: 1977     Last attempt to quit: 2020     Years since quittin.7     Passive exposure: Past    Smokeless tobacco: Never    Tobacco comments:     3 cigarettes a day currently 23 DF.    Substance and Sexual Activity    Alcohol use: Not Currently    Drug use: Never     Past Surgical History:   Procedure Laterality Date    ANKLE SURGERY      BLADDER TUMOR EXCISION      2X 2021/ Dec  2022    CORONARY ANGIOPLASTY WITH STENT PLACEMENT      CYSTOSCOPY      FOOT FRACTURE SURGERY      HAND SURGERY Bilateral     TURBT (TRANSURETHRAL RESECTION OF BLADDER TUMOR)  05/01/2023    WRIST SURGERY           Review of systems:  Review of Systems   Constitutional:  Negative for activity change, appetite change, chills, diaphoresis, fatigue and unexpected weight change.   HENT:  Negative for congestion, facial swelling, hearing loss, mouth sores, trouble swallowing and voice change.    Eyes:  Negative for photophobia, pain, discharge and itching.   Respiratory:  Negative for apnea, cough, choking, chest tightness and shortness of breath.    Cardiovascular:  Negative for chest pain, palpitations and leg swelling.   Gastrointestinal:  Negative for abdominal distention, abdominal pain, anal bleeding and blood in stool.   Endocrine: Negative for cold intolerance, heat intolerance, polydipsia and polyphagia.   Genitourinary:  Positive for hematuria. Negative for difficulty urinating, dysuria and flank pain.   Musculoskeletal:  Negative for arthralgias, back pain, joint swelling, myalgias, neck pain and neck stiffness.   Skin:  Negative for color change, pallor and wound.   Allergic/Immunologic: Negative for environmental allergies, food allergies and immunocompromised state.   Neurological:  Negative for dizziness, seizures, facial asymmetry, speech difficulty, light-headedness, numbness and headaches.   Hematological:  Negative for adenopathy. Does not bruise/bleed easily.   Psychiatric/Behavioral:  Negative for agitation, behavioral problems, confusion, decreased concentration and sleep disturbance.            Physical Exam  Vitals and nursing note reviewed.   Constitutional:       General: He is not in acute distress.     Appearance: Normal appearance. He is not ill-appearing.   HENT:      Head: Normocephalic and atraumatic.      Nose: No congestion or rhinorrhea.   Eyes:      General: No scleral icterus.      Extraocular Movements: Extraocular movements intact.      Pupils: Pupils are equal, round, and reactive to light.   Cardiovascular:      Rate and Rhythm: Normal rate and regular rhythm.      Pulses: Normal pulses.      Heart sounds: Normal heart sounds. No murmur heard.     No gallop.   Pulmonary:      Effort: Pulmonary effort is normal. No respiratory distress.      Breath sounds: Normal breath sounds. No stridor. No wheezing or rhonchi.   Abdominal:      General: Bowel sounds are normal. There is no distension.      Palpations: There is no mass.      Tenderness: There is no abdominal tenderness. There is no guarding.   Musculoskeletal:         General: No swelling, tenderness, deformity or signs of injury. Normal range of motion.      Cervical back: Normal range of motion and neck supple. No rigidity. No muscular tenderness.      Right lower leg: No edema.      Left lower leg: No edema.   Skin:     General: Skin is warm.      Coloration: Skin is not jaundiced or pale.      Findings: No bruising or lesion.   Neurological:      General: No focal deficit present.      Mental Status: He is alert and oriented to person, place, and time.      Cranial Nerves: No cranial nerve deficit.      Sensory: No sensory deficit.      Motor: No weakness.      Gait: Gait normal.   Psychiatric:         Mood and Affect: Mood normal.         Behavior: Behavior normal.         Thought Content: Thought content normal.      Vitals:    09/07/23 0930   BP: 130/78   Pulse: 80   Resp: 18         Body surface area is 1.95 meters squared.    Assessment/Plan:      ECOG 1    1. Invasive moderately differentiated muscle invasive bladder tumor s/p TURBT 12/21/2020: Stage IIIA at presentation now with Stage IV  [facility]us NGS: TP53, ARID1B, KRAS G13D, SMAD4, HIGH TMB noted patient eligible for Keytruda in the future     4/26/23:  Patient presenting to this clinic after a gap of  2 years.  He reports several logistic issues for missing his appointments.   More recently he was seen by Urology for recurrent bladder cancer and plan for TURBT was made.  However it does not show patient has followed back with Urology.  He also reports getting CT scan done at an outside hospital but I do not have any reports available at this time.  I discussed with him about his several no-shows over the last 2 years and the need to look at dose restaging scan before we decide on our next steps.  I strongly encouraged him to keep his upcoming TURBT appointment with Urology and also showed him the note from urology team where they have attempted to contact him.  == 5/24/23:  Patient underwent repeat TURBT and per Urology note he had multiple recurrences in the bladder which were found to be invasive adenocarcinoma high-grade.  Please see the discussion above where patient has repeatedly missed chemo/radiation appointments in the past and more recently presented after a gap of 2 years.  Patient was seen on 05/11/2023 by Dr. Bro post TURBT and he was referred back to Oncology as he had persistent disease after treatment.  Patient was also discussed in tumor board in I am unsure how we can reliably give any kind of treatment to a patient who does not show up persistently.  I will order a PET scan for restaging and to evaluate the lung nodules noted on recent CT scan.  For a patient who was repeatedly noncompliant and does not show up and disappears during chemotherapy I do not feel I have much to offer at this time.  If he is confirmed stage IV based on PET scan results then palliative gemcitabine and carboplatin followed by avelumab maintenance therapy is a consideration.  PET scan has been ordered for restaging. Also depending on his stage and symptoms-- palliative XRT could have a role but he has had multiple no shows with Rad-onc in the past as well.  == 6/21/23:  PET scan for restaging showed a 2.8 x 5.2 cm mass in the anterior aspect of urinary bladder cavity with foci of amorphous  calcification.  Bilateral few scattered lung nodules with increased FDG activity consistent with metastasis.  Soft tissue fullness with increased FDG activity of left oropharynx.  Patient is status post palliative localized re-irradiation secondary to urinary obstructive symptoms and bleeding.  Discussed with patient in detail future management options for palliative chemo immunotherapy for patients who are cis-platinum eligible and will send prior authorization request for gemcitabine and carboplatin him to be followed by avelumab maintenance therapy.  Will also send Tempus testing at this time  ==07/13/2023 Tempus PDL-1 negative, remaining tempus testing pending.   Pt here today to discuss upcoming chemotherapy, side effect profile, risk versus benefits, and expected outcomes have been discussed today. All questions and concerns answered and consents have been signed.  Labs drawn on 7/11/23. Will start chemotherapy on 7/20/2023.  Port placed, skin wnl, no erythema  == 7/27/23: here today to begin carbo/gemzar. Tempus testing discussed, see above.     History of  St III A Bladder Cancer Diagnosed 12/2020:     -- Discussed with patient further management options for muscle invasive bladder cancer s/p TURBT and the pathology report showing findings with a possible bladder cancer vs colo-rectal cancer primary. If not deemed bladder cancer than no specific role of radiation and will be treated with as metastatic colon cancer.  -- Discussed in TMB meeting 1/21/2020 and consensus decision to obtain C-scopy and if no mass evident on C-scopy, pathology would send for further testing/ ? Second opinion. Referral to GI was placed and he is scheduled to get C-scopy 1/26/2020. C-scopy did not reveal any suspicious lesions which patient delayed getting C-scopy for a long time  -- 3/11/2021: Patient did not present back to clinic until today after his last visit, end of Jan 2021. He also missed his GI appointment which was  scheduled urgently at the time. He reports going through family stressors. I discussed with him and counseled about his non-compliance in coming to appointments. Will obtain restaging scans and will await his C-scopy results. Again counseled not to miss appointments.  -- 5/25/2021:  Patient presents to clinic after a delay of more than 2 months and again does not seem to have a valid reason for causing delay in his own care.  He reports having a recent visit to ER where he was noted to have a urinary tract infection and he also underwent CT scan A/P which just showed increase in size of the bladder mass to approximately 5-6 cm from 4 cm.  I discussed with patient in detail about future management options and also pathology confirming this to be a bladder tumor with pure enteric colonic morphology but no GI primary.  I understand this is a very unreliable patient and might not show up for chemotherapy, hence with the support of our staff we will  involve his family.  == Patient reports that he does not have the resources to undergo any type of radical surgery including a radical cystectomy as he already have transport issues even coming to clinic here and would be unable to go anywhere outside the city.  He would like to pursue with bladder preservation with concurrent chemoradiotherapy. I discussed with him that trimodality therapy (TMT) incorporating maximal TURBT followed by radiation therapy (RT) with concurrent radiosensitizing chemotherapy can be a comparably effective regimen to preserve a functioning bladder in well-selected patients with muscle-invasive bladder cancer.   == Will use 5FU Mitomycin with XRT. He is scheduled to see Rad-onc tomorrow and will need port/IV access prior to starting chemotherapy, pt is unable to get PORT, alternative plan is to get PICC line on Friday and proceed with Gemzar/cisplatin.   --6/18/21: s/p C1D1 &D8 gem/cis with XRT maegan well, mild nausea noted but controlled with  "prescribed oral antiemetics. Pt has noted less hematuria and feels overall "good"   --7/6/21:  Patient is one-week delay for cycle 2 due to transportation difficulties last week.  Patient did maintain most of daily radiation though.  Discussed with patient importance of maintaining chemotherapy appointments and will begin doing weekly labs with Kourtney at [facility].  Patient states feeling pretty good no real side effects current leaf from radiation or chemotherapy to complain of.  Plan is to proceed with cycle 2 day 1 on Thursday.  Return to infusion next Thursday for cycle 2 day 8 and return to clinic in 3 weeks prior to cycle 3  --7/29/21: pt here today to begin cycle 3. He is to complete XRT next week. So far tolerating tx very well. Labs reviewed and no new c/o.   --8/10/21: s/p c1d8, completed xrt this week, doing well, labs reviewed and pt cleared for chemo today.   --9/8/21: pt here in clinic today after 3 week absence due to mother passing. He missed his 4 th of chemo but would like to resume his chemo next week. Labs today reviewed and infusion is notified to call him to schedule C4D1 next week. Pt requesting pain medication refill.   Scans to be scheduled after cycle 4 is completed.   --9/29/21: pt here today after not completing cycle 4 or doing ct scans. Counseled patient regarding need for repeat imaging for active surveillance of disease as well as tapering narcotic pain medication now that he has completed chemo/xrt. Today percocet is changed to Norco 5/325mg #30 and pt understands that this will be his final narcotic prescription provided. Orders placed today for PICC to be pulled at infusion.  He is to RTC in 3 weeks with CT scans.   ==08/03/2023: Patient s/p first carboplatin/gemcitabine cycle.  Reports had some nausea relieved with ondansetron.  Plt 66, will hold 1 week and see patient back with repeat labs  ==08/24/2023: Patient received once cycle Carboplatin/Gemcitabine on 7/27/2023, " second cycle held due to thrombocytopenia with rtc appt made for a week later but patient was unable to make appt due to transportation issues. Pt presented to clinic as walk up with reports of hematuria, sensation of incomplete emptying of bladder. States he saw pcp and was told related to bladder cancer, no bladder infection. SrCr increased from 1.1 to 1.46. He is taking 800 mg ibuprofen 1-2x daily for pain.  Patient also has questions regarding how long he will be on chemotherapy and wanted to be done with all oncologic treatment and in remission by October 2023.  I had conversation with patient that he has stage IV disease and goal of treatment is not curative and patient will be on some form of treatment indefinitely.   ==08/31/2023: SrCr Improved, has returned to baseline. Pt received C1D1 Carboplatin/Gemcitabine then did not return to clinic for any further treatment.  Will resume treatment with new cycle.  Reinforced education, no nsaids, encouraged po fluids 6-8 glasses water per day.    ==09/07/2023: Chemo well tolerated, pt received fuphila on day 2 of cycle, no s/s infection.  He reports increase in pain, will add MSER 15 mg, educated on avoiding nsaids and mirilax for bowel regimen.  Labs reviewed, pt cleared for chemotherapy      ==2. CAD:  -- S/p stent placement 03/2021 and follows cardiology  -- Stable    3. Cancer related pain  ==Start MSER 15mg po bid  ==Continue hydrocodone 10/325 q 8 hours prn breakthrough pain  ==Miralax for bowel regimen prevention opoid induced constipation    Plan:  Rtc 2 weeks cbc cmp    Total time spent in counseling and discussion was more than 60 minutes. I discussed in detail further management options at this time and discussion on relevant tests, imaging and ancillary tests as part of the management plan.

## 2023-09-21 ENCOUNTER — OFFICE VISIT (OUTPATIENT)
Dept: HEMATOLOGY/ONCOLOGY | Facility: CLINIC | Age: 58
End: 2023-09-21
Payer: MEDICAID

## 2023-09-21 VITALS
BODY MASS INDEX: 27.2 KG/M2 | HEIGHT: 68 IN | DIASTOLIC BLOOD PRESSURE: 75 MMHG | SYSTOLIC BLOOD PRESSURE: 115 MMHG | OXYGEN SATURATION: 95 % | WEIGHT: 179.5 LBS | RESPIRATION RATE: 18 BRPM | HEART RATE: 78 BPM

## 2023-09-21 DIAGNOSIS — R31.9 HEMATURIA, UNSPECIFIED TYPE: ICD-10-CM

## 2023-09-21 DIAGNOSIS — T45.1X5A CHEMOTHERAPY-INDUCED NAUSEA AND VOMITING: ICD-10-CM

## 2023-09-21 DIAGNOSIS — C67.8 MALIGNANT NEOPLASM OF OVERLAPPING SITES OF BLADDER: Primary | ICD-10-CM

## 2023-09-21 DIAGNOSIS — R11.2 CHEMOTHERAPY-INDUCED NAUSEA AND VOMITING: ICD-10-CM

## 2023-09-21 DIAGNOSIS — G89.3 CANCER RELATED PAIN: ICD-10-CM

## 2023-09-21 LAB
ALBUMIN SERPL BCP-MCNC: 3.2 G/DL (ref 3.4–5)
ALBUMIN/GLOBULIN RATIO: 0.76 RATIO (ref 1.1–1.8)
ALP SERPL-CCNC: 95 U/L (ref 46–116)
ALT SERPL W P-5'-P-CCNC: 30 U/L (ref 12–78)
ANION GAP SERPL CALC-SCNC: 7 MMOL/L (ref 3–11)
AST SERPL-CCNC: 23 U/L (ref 15–37)
ATYPICAL LYMPHOCYTES: 2 % (ref 0–0)
BANDS: 4 % (ref 0–5)
BILIRUB SERPL-MCNC: 0.2 MG/DL (ref 0–1)
BUN SERPL-MCNC: 10 MG/DL (ref 7–18)
BUN/CREAT SERPL: 7.4 RATIO (ref 7–18)
CALCIUM SERPL-MCNC: 9.1 MG/DL (ref 8.8–10.5)
CELLS COUNTED: 100
CHLORIDE SERPL-SCNC: 100 MMOL/L (ref 100–108)
CO2 SERPL-SCNC: 30 MMOL/L (ref 21–32)
CREAT SERPL-MCNC: 1.35 MG/DL (ref 0.7–1.3)
EOSINOPHIL NFR BLD: 5 % (ref 1–3)
ERYTHROCYTE [DISTWIDTH] IN BLOOD BY AUTOMATED COUNT: 16.6 % (ref 12.5–18)
GFR ESTIMATION: 54
GLOBULIN: 4.2 G/DL (ref 2.3–3.5)
GLUCOSE SERPL-MCNC: 97 MG/DL (ref 70–110)
HCT VFR BLD AUTO: 30.2 % (ref 42–52)
HGB BLD-MCNC: 9.8 G/DL (ref 14–18)
LYMPHOCYTES NFR BLD: 17 % (ref 25–40)
MCH RBC QN AUTO: 30.5 PG (ref 27–31.2)
MCHC RBC AUTO-ENTMCNC: 32.5 G/DL (ref 31.8–35.4)
MCV RBC AUTO: 94.1 FL (ref 80–97)
METAMYELOCYTES # BLD MANUAL: 1 % (ref 0–0)
MONOCYTES NFR BLD: 31 % (ref 1–15)
MYELOCYTES: 1 % (ref 0–0)
NEUTROPHILS # BLD AUTO: 1.8 10*3/UL (ref 1.8–7.7)
NEUTROPHILS NFR BLD: 39 % (ref 37–80)
NUCLEATED RED BLOOD CELLS: 0 %
PLATELETS: 132 10*3/UL (ref 142–424)
POTASSIUM SERPL-SCNC: 3.7 MMOL/L (ref 3.6–5.2)
PROT SERPL-MCNC: 7.4 G/DL (ref 6.4–8.2)
RBC # BLD AUTO: 3.21 10*6/UL (ref 4.7–6.1)
RBC MORPH BLD: ABNORMAL
SMALL PLATELETS BLD QL SMEAR: ADEQUATE
SODIUM BLD-SCNC: 137 MMOL/L (ref 135–145)
WBC # BLD: 4.1 10*3/UL (ref 4.6–10.2)

## 2023-09-21 PROCEDURE — 4010F PR ACE/ARB THEARPY RXD/TAKEN: ICD-10-PCS | Mod: CPTII,S$GLB,, | Performed by: NURSE PRACTITIONER

## 2023-09-21 PROCEDURE — 3008F BODY MASS INDEX DOCD: CPT | Mod: CPTII,S$GLB,, | Performed by: NURSE PRACTITIONER

## 2023-09-21 PROCEDURE — 3078F DIAST BP <80 MM HG: CPT | Mod: CPTII,S$GLB,, | Performed by: NURSE PRACTITIONER

## 2023-09-21 PROCEDURE — 99215 OFFICE O/P EST HI 40 MIN: CPT | Mod: S$GLB,,, | Performed by: NURSE PRACTITIONER

## 2023-09-21 PROCEDURE — 1159F PR MEDICATION LIST DOCUMENTED IN MEDICAL RECORD: ICD-10-PCS | Mod: CPTII,S$GLB,, | Performed by: NURSE PRACTITIONER

## 2023-09-21 PROCEDURE — 3008F PR BODY MASS INDEX (BMI) DOCUMENTED: ICD-10-PCS | Mod: CPTII,S$GLB,, | Performed by: NURSE PRACTITIONER

## 2023-09-21 PROCEDURE — 4010F ACE/ARB THERAPY RXD/TAKEN: CPT | Mod: CPTII,S$GLB,, | Performed by: NURSE PRACTITIONER

## 2023-09-21 PROCEDURE — 3078F PR MOST RECENT DIASTOLIC BLOOD PRESSURE < 80 MM HG: ICD-10-PCS | Mod: CPTII,S$GLB,, | Performed by: NURSE PRACTITIONER

## 2023-09-21 PROCEDURE — 99215 PR OFFICE/OUTPT VISIT, EST, LEVL V, 40-54 MIN: ICD-10-PCS | Mod: S$GLB,,, | Performed by: NURSE PRACTITIONER

## 2023-09-21 PROCEDURE — 1159F MED LIST DOCD IN RCRD: CPT | Mod: CPTII,S$GLB,, | Performed by: NURSE PRACTITIONER

## 2023-09-21 PROCEDURE — 3074F SYST BP LT 130 MM HG: CPT | Mod: CPTII,S$GLB,, | Performed by: NURSE PRACTITIONER

## 2023-09-21 PROCEDURE — 3074F PR MOST RECENT SYSTOLIC BLOOD PRESSURE < 130 MM HG: ICD-10-PCS | Mod: CPTII,S$GLB,, | Performed by: NURSE PRACTITIONER

## 2023-09-21 NOTE — PROGRESS NOTES
MEDICAL ONCOLOGY FOLLOW UP  NOTE    Patient ID: Chucho Mack is a 58 y.o. male.    Chief Complaint: Bladder cancer    HPI:  Patient is a 58-year-old male with past medical history of anxiety, gastroesophageal reflux disease, hyperlipidemia, paranoid schizophrenia who  recently underwent transurethral resection of large bladder tumor > 8 cm by Urology and pathology was conclusive of bladder cancer.  Please see pathology report below. He presents to medical oncology clinic today for further evaluation. Reports hematuria post TURBT but doing better since then.    BLADDER TUMOR, TRANSURETHRAL RESECTION: 12/23/2020    - ADENOCARCINOMA, INVASIVE, MODERATELY DIFFERENTIATED.   - TUMOR INVADES MUSCULARIS PROPRIA.   - NEGATIVE FOR LYMPHOVASCULAR INVASION.   - SEE COMMENT.   Comment:   To further evaluate the specimen, the following immunohistochemical stains   were performed by [facility] laboratory, on block 1H with   adequate controls and with interpretation as indicated:      CK7:      negative 0030 S>     CK20:      positive      CDX2:      positive      p63:      negative   Primary adenocarcinomas of the bladder  may show significant morphologic and   immunohistochemical overlap with adenocarcinomas from other primary sites.   In this instance, the tumor shows striking morphologic and   immunohistochemical similarity to primary colorectal adenocarcinoma.   Careful correlation with endoscopic and radiographic findings is necessary   for determination of the definitive primary site.     Imaging:      CT Abdomen and pelvis: 10/7/2020    - Partially calcified soft tissue intraluminal lesion of the urinary bladder.  Malignancy least differential.  - heterogeneously enhancing complex 2.2 cm lesion of the right kidney.  - Cyst of the bilateral kidneys.  - hepatic steatosis with focal fatty sparing.  - colonic diverticular disease    CT chest w/o contrast: 1/25/2021  1.  Fatty infiltration of the liver.         2.  No other acute pathology    CT scan A/P: 5/13/2021    - The bladder mass is increased in size compared with CT from August 2020.  Is much carlos of the adjacent aurora-serosal fat may reflect invasion.  - there are several bilateral renal hypodensities, most of which are too small to characterize in terms of attenuation.  This is stable compared with prior exam.  The largest is a cyst in the left lower pole.  One that is indeterminate in the mid to lower pole the right kidney is probably large enough to characterize a cystic or solid by ultrasound.  Consider ultrasound of this lesion is not been evaluated previously.    Labs:  Lab Results   Component Value Date    WBC 4.1 (L) 09/21/2023    HGB 9.8 (L) 09/21/2023    HCT 30.2 (L) 09/21/2023    MCV 94.1 09/21/2023    LABPLAT 132 (L) 09/21/2023      Platelets   Date Value Ref Range Status   09/21/2023 132 (L) 142 - 424 10*3/uL Final     CMP  Sodium   Date Value Ref Range Status   09/07/2023 136 135 - 145 mmol/L Final     Potassium   Date Value Ref Range Status   09/07/2023 4.4 3.6 - 5.2 mmol/L Final     Chloride   Date Value Ref Range Status   09/07/2023 99 (L) 100 - 108 mmol/L Final     CO2   Date Value Ref Range Status   09/07/2023 29 21 - 32 mmol/L Final     Glucose   Date Value Ref Range Status   09/07/2023 90 70 - 110 mg/dL Final     BUN   Date Value Ref Range Status   09/07/2023 19 (H) 7 - 18 mg/dL Final     Creatinine   Date Value Ref Range Status   09/07/2023 1.27 0.70 - 1.30 mg/dL Final     Calcium   Date Value Ref Range Status   09/07/2023 8.8 8.8 - 10.5 mg/dL Final     Total Protein   Date Value Ref Range Status   09/07/2023 7.5 6.4 - 8.2 g/dL Final     Albumin   Date Value Ref Range Status   09/07/2023 3.5 3.4 - 5.0 g/dL Final     Total Bilirubin   Date Value Ref Range Status   09/07/2023 0.4 0.0 - 1.0 mg/dL Final     Alkaline Phosphatase   Date Value Ref Range Status   09/07/2023 156 (H) 46 - 116 U/L Final     AST   Date Value Ref Range Status    2023 25 15 - 37 U/L Final     ALT   Date Value Ref Range Status   2023 30 12 - 78 U/L Final     Anion Gap   Date Value Ref Range Status   2023 8.0 3.0 - 11.0 mmol/L Final            Past Medical History:   Diagnosis Date    Allergy     Anxiety     Bladder cancer 2021    Coronary artery disease     Depression     Essential (primary) hypertension     GERD (gastroesophageal reflux disease)     Hypercholesterolemia     Paranoid schizophrenia     Type II diabetes mellitus     Urinary tract infection      Family History   Problem Relation Age of Onset    Coronary artery disease Mother     No Known Problems Father     No Known Problems Sister     No Known Problems Sister     No Known Problems Sister     No Known Problems Sister     No Known Problems Sister     Coronary artery disease Brother     Coronary artery disease Brother     Cancer Maternal Grandmother     Skin cancer Maternal Grandmother     Cancer Maternal Aunt     No Known Problems Maternal Uncle     No Known Problems Maternal Uncle     No Known Problems Maternal Uncle     No Known Problems Maternal Uncle     No Known Problems Maternal Uncle     No Known Problems Maternal Uncle     No Known Problems Maternal Uncle     Colon cancer Neg Hx     Colon polyps Neg Hx     Crohn's disease Neg Hx     Liver disease Neg Hx      Social History     Socioeconomic History    Marital status:    Tobacco Use    Smoking status: Every Day     Current packs/day: 0.00     Average packs/day: 1.5 packs/day for 44.0 years (65.9 ttl pk-yrs)     Types: Cigarettes     Start date: 1977     Last attempt to quit: 2020     Years since quittin.7     Passive exposure: Past    Smokeless tobacco: Never    Tobacco comments:     3 cigarettes a day currently 23 DF.    Substance and Sexual Activity    Alcohol use: Not Currently    Drug use: Never     Past Surgical History:   Procedure Laterality Date    ANKLE SURGERY      BLADDER TUMOR EXCISION       2X 2021/ Dec 2022    CORONARY ANGIOPLASTY WITH STENT PLACEMENT      CYSTOSCOPY      FOOT FRACTURE SURGERY      HAND SURGERY Bilateral     TURBT (TRANSURETHRAL RESECTION OF BLADDER TUMOR)  05/01/2023    WRIST SURGERY           Review of systems:  Review of Systems   Constitutional:  Negative for activity change, appetite change, chills, diaphoresis, fatigue and unexpected weight change.   HENT:  Negative for congestion, facial swelling, hearing loss, mouth sores, trouble swallowing and voice change.    Eyes:  Negative for photophobia, pain, discharge and itching.   Respiratory:  Negative for apnea, cough, choking, chest tightness and shortness of breath.    Cardiovascular:  Negative for chest pain, palpitations and leg swelling.   Gastrointestinal:  Negative for abdominal distention, abdominal pain, anal bleeding and blood in stool.   Endocrine: Negative for cold intolerance, heat intolerance, polydipsia and polyphagia.   Genitourinary:  Positive for hematuria. Negative for difficulty urinating, dysuria and flank pain.   Musculoskeletal:  Negative for arthralgias, back pain, joint swelling, myalgias, neck pain and neck stiffness.   Skin:  Negative for color change, pallor and wound.   Allergic/Immunologic: Negative for environmental allergies, food allergies and immunocompromised state.   Neurological:  Negative for dizziness, seizures, facial asymmetry, speech difficulty, light-headedness, numbness and headaches.   Hematological:  Negative for adenopathy. Does not bruise/bleed easily.   Psychiatric/Behavioral:  Negative for agitation, behavioral problems, confusion, decreased concentration and sleep disturbance.            Physical Exam  Vitals and nursing note reviewed.   Constitutional:       General: He is not in acute distress.     Appearance: Normal appearance. He is not ill-appearing.   HENT:      Head: Normocephalic and atraumatic.      Nose: No congestion or rhinorrhea.   Eyes:      General: No scleral  icterus.     Extraocular Movements: Extraocular movements intact.      Pupils: Pupils are equal, round, and reactive to light.   Cardiovascular:      Rate and Rhythm: Normal rate and regular rhythm.      Pulses: Normal pulses.      Heart sounds: Normal heart sounds. No murmur heard.     No gallop.   Pulmonary:      Effort: Pulmonary effort is normal. No respiratory distress.      Breath sounds: Normal breath sounds. No stridor. No wheezing or rhonchi.   Abdominal:      General: Bowel sounds are normal. There is no distension.      Palpations: There is no mass.      Tenderness: There is no abdominal tenderness. There is no guarding.   Musculoskeletal:         General: No swelling, tenderness, deformity or signs of injury. Normal range of motion.      Cervical back: Normal range of motion and neck supple. No rigidity. No muscular tenderness.      Right lower leg: No edema.      Left lower leg: No edema.   Skin:     General: Skin is warm.      Coloration: Skin is not jaundiced or pale.      Findings: No bruising or lesion.   Neurological:      General: No focal deficit present.      Mental Status: He is alert and oriented to person, place, and time.      Cranial Nerves: No cranial nerve deficit.      Sensory: No sensory deficit.      Motor: No weakness.      Gait: Gait normal.   Psychiatric:         Mood and Affect: Mood normal.         Behavior: Behavior normal.         Thought Content: Thought content normal.      There were no vitals filed for this visit.        There is no height or weight on file to calculate BSA.    Assessment/Plan:      ECOG 1    1. Invasive moderately differentiated muscle invasive bladder tumor s/p TURBT 12/21/2020: Stage IIIA at presentation now with Stage IV  Tempus NGS: TP53, ARID1B, KRAS G13D, SMAD4, HIGH TMB noted patient eligible for Keytruda in the future     4/26/23:  Patient presenting to this clinic after a gap of  2 years.  He reports several logistic issues for missing his  appointments.  More recently he was seen by Urology for recurrent bladder cancer and plan for TURBT was made.  However it does not show patient has followed back with Urology.  He also reports getting CT scan done at an outside hospital but I do not have any reports available at this time.  I discussed with him about his several no-shows over the last 2 years and the need to look at dose restaging scan before we decide on our next steps.  I strongly encouraged him to keep his upcoming TURBT appointment with Urology and also showed him the note from urology team where they have attempted to contact him.  == 5/24/23:  Patient underwent repeat TURBT and per Urology note he had multiple recurrences in the bladder which were found to be invasive adenocarcinoma high-grade.  Please see the discussion above where patient has repeatedly missed chemo/radiation appointments in the past and more recently presented after a gap of 2 years.  Patient was seen on 05/11/2023 by Dr. Bro post TURBT and he was referred back to Oncology as he had persistent disease after treatment.  Patient was also discussed in tumor board in I am unsure how we can reliably give any kind of treatment to a patient who does not show up persistently.  I will order a PET scan for restaging and to evaluate the lung nodules noted on recent CT scan.  For a patient who was repeatedly noncompliant and does not show up and disappears during chemotherapy I do not feel I have much to offer at this time.  If he is confirmed stage IV based on PET scan results then palliative gemcitabine and carboplatin followed by avelumab maintenance therapy is a consideration.  PET scan has been ordered for restaging. Also depending on his stage and symptoms-- palliative XRT could have a role but he has had multiple no shows with Rad-onc in the past as well.  == 6/21/23:  PET scan for restaging showed a 2.8 x 5.2 cm mass in the anterior aspect of urinary bladder cavity with  foci of amorphous calcification.  Bilateral few scattered lung nodules with increased FDG activity consistent with metastasis.  Soft tissue fullness with increased FDG activity of left oropharynx.  Patient is status post palliative localized re-irradiation secondary to urinary obstructive symptoms and bleeding.  Discussed with patient in detail future management options for palliative chemo immunotherapy for patients who are cis-platinum eligible and will send prior authorization request for gemcitabine and carboplatin him to be followed by avelumab maintenance therapy.  Will also send Tempus testing at this time  ==07/13/2023 Tempus PDL-1 negative, remaining tempus testing pending.   Pt here today to discuss upcoming chemotherapy, side effect profile, risk versus benefits, and expected outcomes have been discussed today. All questions and concerns answered and consents have been signed.  Labs drawn on 7/11/23. Will start chemotherapy on 7/20/2023.  Port placed, skin wnl, no erythema  == 7/27/23: here today to begin carbo/gemzar. Tempus testing discussed, see above.     History of  St III A Bladder Cancer Diagnosed 12/2020:     -- Discussed with patient further management options for muscle invasive bladder cancer s/p TURBT and the pathology report showing findings with a possible bladder cancer vs colo-rectal cancer primary. If not deemed bladder cancer than no specific role of radiation and will be treated with as metastatic colon cancer.  -- Discussed in TMB meeting 1/21/2020 and consensus decision to obtain C-scopy and if no mass evident on C-scopy, pathology would send for further testing/ ? Second opinion. Referral to GI was placed and he is scheduled to get C-scopy 1/26/2020. C-scopy did not reveal any suspicious lesions which patient delayed getting C-scopy for a long time  -- 3/11/2021: Patient did not present back to clinic until today after his last visit, end of Jan 2021. He also missed his GI  appointment which was scheduled urgently at the time. He reports going through family stressors. I discussed with him and counseled about his non-compliance in coming to appointments. Will obtain restaging scans and will await his C-scopy results. Again counseled not to miss appointments.  -- 5/25/2021:  Patient presents to clinic after a delay of more than 2 months and again does not seem to have a valid reason for causing delay in his own care.  He reports having a recent visit to ER where he was noted to have a urinary tract infection and he also underwent CT scan A/P which just showed increase in size of the bladder mass to approximately 5-6 cm from 4 cm.  I discussed with patient in detail about future management options and also pathology confirming this to be a bladder tumor with pure enteric colonic morphology but no GI primary.  I understand this is a very unreliable patient and might not show up for chemotherapy, hence with the support of our staff we will  involve his family.  == Patient reports that he does not have the resources to undergo any type of radical surgery including a radical cystectomy as he already have transport issues even coming to clinic here and would be unable to go anywhere outside the city.  He would like to pursue with bladder preservation with concurrent chemoradiotherapy. I discussed with him that trimodality therapy (TMT) incorporating maximal TURBT followed by radiation therapy (RT) with concurrent radiosensitizing chemotherapy can be a comparably effective regimen to preserve a functioning bladder in well-selected patients with muscle-invasive bladder cancer.   == Will use 5FU Mitomycin with XRT. He is scheduled to see Rad-onc tomorrow and will need port/IV access prior to starting chemotherapy, pt is unable to get PORT, alternative plan is to get PICC line on Friday and proceed with Gemzar/cisplatin.   --6/18/21: s/p C1D1 &D8 gem/cis with XRT maegan well, mild nausea noted but  "controlled with prescribed oral antiemetics. Pt has noted less hematuria and feels overall "good"   --7/6/21:  Patient is one-week delay for cycle 2 due to transportation difficulties last week.  Patient did maintain most of daily radiation though.  Discussed with patient importance of maintaining chemotherapy appointments and will begin doing weekly labs with Kourtney at [facility].  Patient states feeling pretty good no real side effects current leaf from radiation or chemotherapy to complain of.  Plan is to proceed with cycle 2 day 1 on Thursday.  Return to infusion next Thursday for cycle 2 day 8 and return to clinic in 3 weeks prior to cycle 3  --7/29/21: pt here today to begin cycle 3. He is to complete XRT next week. So far tolerating tx very well. Labs reviewed and no new c/o.   --8/10/21: s/p c1d8, completed xrt this week, doing well, labs reviewed and pt cleared for chemo today.   --9/8/21: pt here in clinic today after 3 week absence due to mother passing. He missed his 4 th of chemo but would like to resume his chemo next week. Labs today reviewed and infusion is notified to call him to schedule C4D1 next week. Pt requesting pain medication refill.   Scans to be scheduled after cycle 4 is completed.   --9/29/21: pt here today after not completing cycle 4 or doing ct scans. Counseled patient regarding need for repeat imaging for active surveillance of disease as well as tapering narcotic pain medication now that he has completed chemo/xrt. Today percocet is changed to Norco 5/325mg #30 and pt understands that this will be his final narcotic prescription provided. Orders placed today for PICC to be pulled at infusion.  He is to RTC in 3 weeks with CT scans.   ==08/03/2023: Patient s/p first carboplatin/gemcitabine cycle.  Reports had some nausea relieved with ondansetron.  Plt 66, will hold 1 week and see patient back with repeat labs  ==08/24/2023: Patient received once cycle Carboplatin/Gemcitabine " on 7/27/2023, second cycle held due to thrombocytopenia with rtc appt made for a week later but patient was unable to make appt due to transportation issues. Pt presented to clinic as walk up with reports of hematuria, sensation of incomplete emptying of bladder. States he saw pcp and was told related to bladder cancer, no bladder infection. SrCr increased from 1.1 to 1.46. He is taking 800 mg ibuprofen 1-2x daily for pain.  Patient also has questions regarding how long he will be on chemotherapy and wanted to be done with all oncologic treatment and in remission by October 2023.  I had conversation with patient that he has stage IV disease and goal of treatment is not curative and patient will be on some form of treatment indefinitely.   ==08/31/2023: SrCr Improved, has returned to baseline. Pt received C1D1 Carboplatin/Gemcitabine then did not return to clinic for any further treatment.  Will resume treatment with new cycle.  Reinforced education, no nsaids, encouraged po fluids 6-8 glasses water per day.    ==09/07/2023: Chemo well tolerated, pt received fuphila on day 2 of cycle, no s/s infection.  He reports increase in pain, will add MSER 15 mg, educated on avoiding nsaids and mirilax for bowel regimen.  Labs reviewed, pt cleared for chemotherapy  ==09/21/2023: Patient presented to clinic and reported to RN rooming patient that he felt his morphine was giving him homicidal thoughts. He reported he had racing thoughts he could not control including plans for homicide, patient kept in room, was calm disposition across from nurses station. He was sent to ER with law enforcement escort.       ==2. CAD:  -- S/p stent placement 03/2021 and follows cardiology  -- Stable    3. Cancer related pain  ==Start MSER 15mg po bid  ==Continue hydrocodone 10/325 q 8 hours prn breakthrough pain  ==Miralax for bowel regimen prevention opoid induced constipation    Plan:  Rtc once d/c from hospital    Total time spent in  counseling and discussion was more than 60 minutes. I discussed in detail further management options at this time and discussion on relevant tests, imaging and ancillary tests as part of the management plan.

## 2023-09-25 ENCOUNTER — TELEPHONE (OUTPATIENT)
Dept: HEMATOLOGY/ONCOLOGY | Facility: CLINIC | Age: 58
End: 2023-09-25
Payer: MEDICAID

## 2023-09-25 DIAGNOSIS — C67.8 MALIGNANT NEOPLASM OF OVERLAPPING SITES OF BLADDER: ICD-10-CM

## 2023-09-25 DIAGNOSIS — G89.3 CANCER RELATED PAIN: ICD-10-CM

## 2023-09-25 RX ORDER — HYDROCODONE BITARTRATE AND ACETAMINOPHEN 10; 325 MG/1; MG/1
1 TABLET ORAL EVERY 8 HOURS PRN
Qty: 60 TABLET | Refills: 0 | Status: SHIPPED | OUTPATIENT
Start: 2023-09-25 | End: 2023-10-09 | Stop reason: ALTCHOICE

## 2023-09-25 NOTE — TELEPHONE ENCOUNTER
----- Message from Olga Tee NP sent at 9/25/2023  2:28 PM CDT -----  Contact: Pt  I sent out hydrocodone earlier. Please reach out to patient and see which he would like we dont do oxycodone and hydrocodone together.     If he wants the oxycodone please reach out to the pharmacy to let them know not to fill the hydrocodone and let me know so I can cancel it in epic      After that I can send out oxycodone or he can just keep hydrocodone      ----- Message -----  From: Cristiana Horne LPN  Sent: 9/25/2023   1:38 PM CDT  To: Olga Tee NP      ----- Message -----  From: Zena Childress  Sent: 9/25/2023   1:28 PM CDT  To: Vinod Le Staff    Type:  RX Refill Request    Who Called:pt   Refill or New Rx: refill   RX Name and Strength:noxycodone 10   How is the patient currently taking it? (ex. 1XDay): twice   Is this a 30 day or 90 day RX: 30 days   [facility] phone [facility]   Local or Mail Order:local   Ordering Provider: Vinod   Would the patient rather a call back or a response via [facility]?  Phone   Best Call Back Number:  [phone]      [facility] - [address] [address]  Phone: [phone] Fax: [phone]

## 2023-09-25 NOTE — TELEPHONE ENCOUNTER
----- Message from Lissamatthew Johnson sent at 9/25/2023 10:50 AM CDT -----  Type:  RX Refill Request    Who Called: Chucho Mack]  Refill or New Rx: refill  RX Name and Strength: HYDROcodone-acetaminophen (NORCO)  mg per tablet  How is the patient currently taking it? (ex. 1XDay):2Xday   Is this a 30 day or 90 day RX:60  Preferred Pharmacy with phone number:  [facility] - [address] [address]  Phone: [phone] Fax: [phone]      Local or Mail Order:Local   Ordering Provider:Vinod   Would the patient rather a call back or a response via [facility]?    Best Call Back Number:[phone]    Additional Information: -    Sent refill request to provider for approval. KB LPN.

## 2023-09-29 ENCOUNTER — TELEPHONE (OUTPATIENT)
Dept: HEMATOLOGY/ONCOLOGY | Facility: CLINIC | Age: 58
End: 2023-09-29
Payer: MEDICAID

## 2023-10-05 LAB
ALBUMIN SERPL BCP-MCNC: 3.4 G/DL (ref 3.4–5)
ALBUMIN/GLOBULIN RATIO: 0.76 RATIO (ref 1.1–1.8)
ALP SERPL-CCNC: 96 U/L (ref 46–116)
ALT SERPL W P-5'-P-CCNC: 20 U/L (ref 12–78)
ANION GAP SERPL CALC-SCNC: 9 MMOL/L (ref 3–11)
AST SERPL-CCNC: 20 U/L (ref 15–37)
BASOPHILS NFR BLD: 0.2 % (ref 0–3)
BILIRUB SERPL-MCNC: 0.3 MG/DL (ref 0–1)
BUN SERPL-MCNC: 23 MG/DL (ref 7–18)
BUN/CREAT SERPL: 13.37 RATIO (ref 7–18)
CALCIUM SERPL-MCNC: 9.5 MG/DL (ref 8.8–10.5)
CHLORIDE SERPL-SCNC: 99 MMOL/L (ref 100–108)
CO2 SERPL-SCNC: 29 MMOL/L (ref 21–32)
CREAT SERPL-MCNC: 1.72 MG/DL (ref 0.7–1.3)
EOSINOPHIL NFR BLD: 0.8 % (ref 1–3)
ERYTHROCYTE [DISTWIDTH] IN BLOOD BY AUTOMATED COUNT: 18.4 % (ref 12.5–18)
GFR ESTIMATION: 41
GLOBULIN: 4.5 G/DL (ref 2.3–3.5)
GLUCOSE SERPL-MCNC: 85 MG/DL (ref 70–110)
HCT VFR BLD AUTO: 32.8 % (ref 42–52)
HGB BLD-MCNC: 10.7 G/DL (ref 14–18)
LYMPHOCYTES NFR BLD: 11.8 % (ref 25–40)
MCH RBC QN AUTO: 31.5 PG (ref 27–31.2)
MCHC RBC AUTO-ENTMCNC: 32.6 G/DL (ref 31.8–35.4)
MCV RBC AUTO: 96.5 FL (ref 80–97)
MONOCYTES NFR BLD: 12.7 % (ref 1–15)
NEUTROPHILS # BLD AUTO: 7.01 10*3/UL (ref 1.8–7.7)
NEUTROPHILS NFR BLD: 73.7 % (ref 37–80)
NUCLEATED RED BLOOD CELLS: 0 %
PLATELETS: 357 10*3/UL (ref 142–424)
POTASSIUM SERPL-SCNC: 4.3 MMOL/L (ref 3.6–5.2)
PROT SERPL-MCNC: 7.9 G/DL (ref 6.4–8.2)
RBC # BLD AUTO: 3.4 10*6/UL (ref 4.7–6.1)
RBC MORPH BLD: NORMAL
SODIUM BLD-SCNC: 137 MMOL/L (ref 135–145)
WBC # BLD: 9.5 10*3/UL (ref 4.6–10.2)

## 2023-10-06 ENCOUNTER — TELEPHONE (OUTPATIENT)
Dept: HEMATOLOGY/ONCOLOGY | Facility: CLINIC | Age: 58
End: 2023-10-06
Payer: MEDICAID

## 2023-10-06 NOTE — TELEPHONE ENCOUNTER
Spoke to patient to confirm appointment with Dr. Brock on Wednesday 10/11 with planned treatment to follow.  Patient expressed understanding.  Patient also requested alternative to the Morphine which was Dc'd at ED on 9/21.  Patient states that he has been able to be pain free without side effects when taking Percocet 7.5 BID.  Patient advised that request would be sent to providers to best address proper pain management.  Patient expressed understanding.    LO 10/6/23 @9:03am

## 2023-10-09 DIAGNOSIS — G89.3 CANCER RELATED PAIN: Primary | ICD-10-CM

## 2023-10-09 RX ORDER — OXYCODONE AND ACETAMINOPHEN 7.5; 325 MG/1; MG/1
1 TABLET ORAL EVERY 12 HOURS PRN
Qty: 60 TABLET | Refills: 0 | Status: SHIPPED | OUTPATIENT
Start: 2023-10-09 | End: 2023-11-16 | Stop reason: SDUPTHER

## 2023-10-11 ENCOUNTER — OFFICE VISIT (OUTPATIENT)
Dept: HEMATOLOGY/ONCOLOGY | Facility: CLINIC | Age: 58
End: 2023-10-11
Payer: MEDICAID

## 2023-10-11 VITALS
DIASTOLIC BLOOD PRESSURE: 88 MMHG | SYSTOLIC BLOOD PRESSURE: 153 MMHG | HEART RATE: 85 BPM | RESPIRATION RATE: 18 BRPM | HEIGHT: 68 IN | WEIGHT: 181.19 LBS | BODY MASS INDEX: 27.46 KG/M2 | OXYGEN SATURATION: 98 %

## 2023-10-11 DIAGNOSIS — C67.9 MALIGNANT NEOPLASM OF URINARY BLADDER, UNSPECIFIED SITE: ICD-10-CM

## 2023-10-11 DIAGNOSIS — C67.8 MALIGNANT NEOPLASM OF OVERLAPPING SITES OF BLADDER: Primary | ICD-10-CM

## 2023-10-11 PROCEDURE — 99215 OFFICE O/P EST HI 40 MIN: CPT | Mod: S$GLB,,, | Performed by: INTERNAL MEDICINE

## 2023-10-11 PROCEDURE — 3077F PR MOST RECENT SYSTOLIC BLOOD PRESSURE >= 140 MM HG: ICD-10-PCS | Mod: CPTII,S$GLB,, | Performed by: INTERNAL MEDICINE

## 2023-10-11 PROCEDURE — 3079F DIAST BP 80-89 MM HG: CPT | Mod: CPTII,S$GLB,, | Performed by: INTERNAL MEDICINE

## 2023-10-11 PROCEDURE — 3008F PR BODY MASS INDEX (BMI) DOCUMENTED: ICD-10-PCS | Mod: CPTII,S$GLB,, | Performed by: INTERNAL MEDICINE

## 2023-10-11 PROCEDURE — 4010F ACE/ARB THERAPY RXD/TAKEN: CPT | Mod: CPTII,S$GLB,, | Performed by: INTERNAL MEDICINE

## 2023-10-11 PROCEDURE — 3077F SYST BP >= 140 MM HG: CPT | Mod: CPTII,S$GLB,, | Performed by: INTERNAL MEDICINE

## 2023-10-11 PROCEDURE — 99215 PR OFFICE/OUTPT VISIT, EST, LEVL V, 40-54 MIN: ICD-10-PCS | Mod: S$GLB,,, | Performed by: INTERNAL MEDICINE

## 2023-10-11 PROCEDURE — 1159F PR MEDICATION LIST DOCUMENTED IN MEDICAL RECORD: ICD-10-PCS | Mod: CPTII,S$GLB,, | Performed by: INTERNAL MEDICINE

## 2023-10-11 PROCEDURE — 3008F BODY MASS INDEX DOCD: CPT | Mod: CPTII,S$GLB,, | Performed by: INTERNAL MEDICINE

## 2023-10-11 PROCEDURE — 4010F PR ACE/ARB THEARPY RXD/TAKEN: ICD-10-PCS | Mod: CPTII,S$GLB,, | Performed by: INTERNAL MEDICINE

## 2023-10-11 PROCEDURE — 3079F PR MOST RECENT DIASTOLIC BLOOD PRESSURE 80-89 MM HG: ICD-10-PCS | Mod: CPTII,S$GLB,, | Performed by: INTERNAL MEDICINE

## 2023-10-11 PROCEDURE — 1159F MED LIST DOCD IN RCRD: CPT | Mod: CPTII,S$GLB,, | Performed by: INTERNAL MEDICINE

## 2023-10-11 RX ORDER — SODIUM CHLORIDE 0.9 % (FLUSH) 0.9 %
10 SYRINGE (ML) INJECTION
Status: CANCELLED | OUTPATIENT
Start: 2023-10-11

## 2023-10-11 RX ORDER — HEPARIN 100 UNIT/ML
500 SYRINGE INTRAVENOUS
Status: CANCELLED | OUTPATIENT
Start: 2023-10-11

## 2023-10-11 NOTE — PROGRESS NOTES
MEDICAL ONCOLOGY FOLLOW UP  NOTE    Patient ID: Chucho Mack is a 58 y.o. male.    Chief Complaint: Bladder cancer    HPI:  Patient is a 58-year-old male with past medical history of anxiety, gastroesophageal reflux disease, hyperlipidemia, paranoid schizophrenia who  recently underwent transurethral resection of large bladder tumor > 8 cm by Urology and pathology was conclusive of bladder cancer.  Please see pathology report below. He presents to medical oncology clinic today for further evaluation. Reports hematuria post TURBT but doing better since then.    BLADDER TUMOR, TRANSURETHRAL RESECTION: 12/23/2020    - ADENOCARCINOMA, INVASIVE, MODERATELY DIFFERENTIATED.   - TUMOR INVADES MUSCULARIS PROPRIA.   - NEGATIVE FOR LYMPHOVASCULAR INVASION.   - SEE COMMENT.   Comment:   To further evaluate the specimen, the following immunohistochemical stains   were performed by [facility] laboratory, on block 1H with   adequate controls and with interpretation as indicated:      CK7:      negative 0030 S>     CK20:      positive      CDX2:      positive      p63:      negative   Primary adenocarcinomas of the bladder  may show significant morphologic and   immunohistochemical overlap with adenocarcinomas from other primary sites.   In this instance, the tumor shows striking morphologic and   immunohistochemical similarity to primary colorectal adenocarcinoma.   Careful correlation with endoscopic and radiographic findings is necessary   for determination of the definitive primary site.     Imaging:      CT Abdomen and pelvis: 10/7/2020    - Partially calcified soft tissue intraluminal lesion of the urinary bladder.  Malignancy least differential.  - heterogeneously enhancing complex 2.2 cm lesion of the right kidney.  - Cyst of the bilateral kidneys.  - hepatic steatosis with focal fatty sparing.  - colonic diverticular disease    CT chest w/o contrast: 1/25/2021  1.  Fatty infiltration of the liver.         2.  No other acute pathology    CT scan A/P: 5/13/2021    - The bladder mass is increased in size compared with CT from August 2020.  Is much carlos of the adjacent aurora-serosal fat may reflect invasion.  - there are several bilateral renal hypodensities, most of which are too small to characterize in terms of attenuation.  This is stable compared with prior exam.  The largest is a cyst in the left lower pole.  One that is indeterminate in the mid to lower pole the right kidney is probably large enough to characterize a cystic or solid by ultrasound.  Consider ultrasound of this lesion is not been evaluated previously.    Labs:  Lab Results   Component Value Date    WBC 9.5 10/05/2023    HGB 10.7 (L) 10/05/2023    HCT 32.8 (L) 10/05/2023    MCV 96.5 10/05/2023    LABPLAT 357 10/05/2023      Platelets   Date Value Ref Range Status   10/05/2023 357 142 - 424 10*3/uL Final     CMP  Sodium   Date Value Ref Range Status   10/05/2023 137 135 - 145 mmol/L Final     Potassium   Date Value Ref Range Status   10/05/2023 4.3 3.6 - 5.2 mmol/L Final     Chloride   Date Value Ref Range Status   10/05/2023 99 (L) 100 - 108 mmol/L Final     CO2   Date Value Ref Range Status   10/05/2023 29 21 - 32 mmol/L Final     Glucose   Date Value Ref Range Status   10/05/2023 85 70 - 110 mg/dL Final     BUN   Date Value Ref Range Status   10/05/2023 23 (H) 7 - 18 mg/dL Final     Creatinine   Date Value Ref Range Status   10/05/2023 1.72 (H) 0.70 - 1.30 mg/dL Final     Calcium   Date Value Ref Range Status   10/05/2023 9.5 8.8 - 10.5 mg/dL Final     Total Protein   Date Value Ref Range Status   10/05/2023 7.9 6.4 - 8.2 g/dL Final     Albumin   Date Value Ref Range Status   10/05/2023 3.4 3.4 - 5.0 g/dL Final     Total Bilirubin   Date Value Ref Range Status   10/05/2023 0.3 0.0 - 1.0 mg/dL Final     Alkaline Phosphatase   Date Value Ref Range Status   10/05/2023 96 46 - 116 U/L Final     AST   Date Value Ref Range Status   10/05/2023 20 15 -  37 U/L Final     ALT   Date Value Ref Range Status   10/05/2023 20 12 - 78 U/L Final     Anion Gap   Date Value Ref Range Status   10/05/2023 9.0 3.0 - 11.0 mmol/L Final            Past Medical History:   Diagnosis Date    Allergy     Anxiety     Bladder cancer 2021    Coronary artery disease     Depression     Essential (primary) hypertension     GERD (gastroesophageal reflux disease)     Hypercholesterolemia     Paranoid schizophrenia     Type II diabetes mellitus     Urinary tract infection      Family History   Problem Relation Age of Onset    Coronary artery disease Mother     No Known Problems Father     No Known Problems Sister     No Known Problems Sister     No Known Problems Sister     No Known Problems Sister     No Known Problems Sister     Coronary artery disease Brother     Coronary artery disease Brother     Cancer Maternal Grandmother     Skin cancer Maternal Grandmother     Cancer Maternal Aunt     No Known Problems Maternal Uncle     No Known Problems Maternal Uncle     No Known Problems Maternal Uncle     No Known Problems Maternal Uncle     No Known Problems Maternal Uncle     No Known Problems Maternal Uncle     No Known Problems Maternal Uncle     Colon cancer Neg Hx     Colon polyps Neg Hx     Crohn's disease Neg Hx     Liver disease Neg Hx      Social History     Socioeconomic History    Marital status:    Tobacco Use    Smoking status: Former     Current packs/day: 0.00     Average packs/day: 1.5 packs/day for 44.0 years (65.9 ttl pk-yrs)     Types: Cigarettes     Start date: 1977     Quit date: 2020     Years since quittin.7     Passive exposure: Past    Smokeless tobacco: Never    Tobacco comments:     3 cigarettes a day currently 23 DF.    Substance and Sexual Activity    Alcohol use: Not Currently    Drug use: Never     Past Surgical History:   Procedure Laterality Date    ANKLE SURGERY      BLADDER TUMOR EXCISION      2X 2021/ Dec 2022    CORONARY  ANGIOPLASTY WITH STENT PLACEMENT      CYSTOSCOPY      FOOT FRACTURE SURGERY      HAND SURGERY Bilateral     TURBT (TRANSURETHRAL RESECTION OF BLADDER TUMOR)  05/01/2023    WRIST SURGERY           Review of systems:  Review of Systems   Constitutional:  Negative for activity change, appetite change, chills, diaphoresis, fatigue and unexpected weight change.   HENT:  Negative for congestion, facial swelling, hearing loss, mouth sores, trouble swallowing and voice change.    Eyes:  Negative for photophobia, pain, discharge and itching.   Respiratory:  Negative for apnea, cough, choking, chest tightness and shortness of breath.    Cardiovascular:  Negative for chest pain, palpitations and leg swelling.   Gastrointestinal:  Negative for abdominal distention, abdominal pain, anal bleeding and blood in stool.   Endocrine: Negative for cold intolerance, heat intolerance, polydipsia and polyphagia.   Genitourinary:  Negative for difficulty urinating, dysuria, flank pain and hematuria.   Musculoskeletal:  Negative for arthralgias, back pain, joint swelling, myalgias, neck pain and neck stiffness.   Skin:  Negative for color change, pallor and wound.   Allergic/Immunologic: Negative for environmental allergies, food allergies and immunocompromised state.   Neurological:  Negative for dizziness, seizures, facial asymmetry, speech difficulty, light-headedness, numbness and headaches.   Hematological:  Negative for adenopathy. Does not bruise/bleed easily.   Psychiatric/Behavioral:  Negative for agitation, behavioral problems, confusion, decreased concentration and sleep disturbance.              Physical Exam  Vitals and nursing note reviewed.   Constitutional:       General: He is not in acute distress.     Appearance: Normal appearance. He is not ill-appearing.   HENT:      Head: Normocephalic and atraumatic.      Nose: No congestion or rhinorrhea.   Eyes:      General: No scleral icterus.     Extraocular Movements:  Extraocular movements intact.      Pupils: Pupils are equal, round, and reactive to light.   Cardiovascular:      Rate and Rhythm: Normal rate and regular rhythm.      Pulses: Normal pulses.      Heart sounds: Normal heart sounds. No murmur heard.     No gallop.   Pulmonary:      Effort: Pulmonary effort is normal. No respiratory distress.      Breath sounds: Normal breath sounds. No stridor. No wheezing or rhonchi.   Abdominal:      General: Bowel sounds are normal. There is no distension.      Palpations: There is no mass.      Tenderness: There is no abdominal tenderness. There is no guarding.   Musculoskeletal:         General: No swelling, tenderness, deformity or signs of injury. Normal range of motion.      Cervical back: Normal range of motion and neck supple. No rigidity. No muscular tenderness.      Right lower leg: No edema.      Left lower leg: No edema.   Skin:     General: Skin is warm.      Coloration: Skin is not jaundiced or pale.      Findings: No bruising or lesion.   Neurological:      General: No focal deficit present.      Mental Status: He is alert and oriented to person, place, and time.      Cranial Nerves: No cranial nerve deficit.      Sensory: No sensory deficit.      Motor: No weakness.      Gait: Gait normal.   Psychiatric:         Mood and Affect: Mood normal.         Behavior: Behavior normal.         Thought Content: Thought content normal.        Vitals:    10/11/23 0928   BP: (!) 153/88   Pulse: 85   Resp: 18           Body surface area is 1.99 meters squared.    Assessment/Plan:      ECOG 1    1. Invasive moderately differentiated muscle invasive bladder tumor s/p TURBT 12/21/2020: Stage IIIA at presentation now with Stage IV  Tempus NGS: TP53, ARID1B, KRAS G13D, SMAD4, HIGH TMB noted patient eligible for Keytruda in the future     4/26/23:  Patient presenting to this clinic after a gap of  2 years.  He reports several logistic issues for missing his appointments.  More recently  he was seen by Urology for recurrent bladder cancer and plan for TURBT was made.  However it does not show patient has followed back with Urology.  He also reports getting CT scan done at an outside hospital but I do not have any reports available at this time.  I discussed with him about his several no-shows over the last 2 years and the need to look at dose restaging scan before we decide on our next steps.  I strongly encouraged him to keep his upcoming TURBT appointment with Urology and also showed him the note from urology team where they have attempted to contact him.  == 5/24/23:  Patient underwent repeat TURBT and per Urology note he had multiple recurrences in the bladder which were found to be invasive adenocarcinoma high-grade.  Please see the discussion above where patient has repeatedly missed chemo/radiation appointments in the past and more recently presented after a gap of 2 years.  Patient was seen on 05/11/2023 by Dr. Bro post TURBT and he was referred back to Oncology as he had persistent disease after treatment.  Patient was also discussed in tumor board in I am unsure how we can reliably give any kind of treatment to a patient who does not show up persistently.  I will order a PET scan for restaging and to evaluate the lung nodules noted on recent CT scan.  For a patient who was repeatedly noncompliant and does not show up and disappears during chemotherapy I do not feel I have much to offer at this time.  If he is confirmed stage IV based on PET scan results then palliative gemcitabine and carboplatin followed by avelumab maintenance therapy is a consideration.  PET scan has been ordered for restaging. Also depending on his stage and symptoms-- palliative XRT could have a role but he has had multiple no shows with Rad-onc in the past as well.  == 6/21/23:  PET scan for restaging showed a 2.8 x 5.2 cm mass in the anterior aspect of urinary bladder cavity with foci of amorphous calcification.   Bilateral few scattered lung nodules with increased FDG activity consistent with metastasis.  Soft tissue fullness with increased FDG activity of left oropharynx.  Patient is status post palliative localized re-irradiation secondary to urinary obstructive symptoms and bleeding.  Discussed with patient in detail future management options for palliative chemo immunotherapy for patients who are cis-platinum eligible and will send prior authorization request for gemcitabine and carboplatin him to be followed by avelumab maintenance therapy.  Will also send Tempus testing at this time  ==07/13/2023 Tempus PDL-1 negative, remaining tempus testing pending.   Pt here today to discuss upcoming chemotherapy, side effect profile, risk versus benefits, and expected outcomes have been discussed today. All questions and concerns answered and consents have been signed.  Labs drawn on 7/11/23. Will start chemotherapy on 7/20/2023.  Port placed, skin wnl, no erythema  == 7/27/23: here today to begin carbo/gemzar. Tempus testing discussed, see above.     History of  St III A Bladder Cancer Diagnosed 12/2020:     -- Discussed with patient further management options for muscle invasive bladder cancer s/p TURBT and the pathology report showing findings with a possible bladder cancer vs colo-rectal cancer primary. If not deemed bladder cancer than no specific role of radiation and will be treated with as metastatic colon cancer.  -- Discussed in TMB meeting 1/21/2020 and consensus decision to obtain C-scopy and if no mass evident on C-scopy, pathology would send for further testing/ ? Second opinion. Referral to GI was placed and he is scheduled to get C-scopy 1/26/2020. C-scopy did not reveal any suspicious lesions which patient delayed getting C-scopy for a long time  -- 3/11/2021: Patient did not present back to clinic until today after his last visit, end of Jan 2021. He also missed his GI appointment which was scheduled urgently at  the time. He reports going through family stressors. I discussed with him and counseled about his non-compliance in coming to appointments. Will obtain restaging scans and will await his C-scopy results. Again counseled not to miss appointments.  -- 5/25/2021:  Patient presents to clinic after a delay of more than 2 months and again does not seem to have a valid reason for causing delay in his own care.  He reports having a recent visit to ER where he was noted to have a urinary tract infection and he also underwent CT scan A/P which just showed increase in size of the bladder mass to approximately 5-6 cm from 4 cm.  I discussed with patient in detail about future management options and also pathology confirming this to be a bladder tumor with pure enteric colonic morphology but no GI primary.  I understand this is a very unreliable patient and might not show up for chemotherapy, hence with the support of our staff we will  involve his family.  == Patient reports that he does not have the resources to undergo any type of radical surgery including a radical cystectomy as he already have transport issues even coming to clinic here and would be unable to go anywhere outside the city.  He would like to pursue with bladder preservation with concurrent chemoradiotherapy. I discussed with him that trimodality therapy (TMT) incorporating maximal TURBT followed by radiation therapy (RT) with concurrent radiosensitizing chemotherapy can be a comparably effective regimen to preserve a functioning bladder in well-selected patients with muscle-invasive bladder cancer.   == Will use 5FU Mitomycin with XRT. He is scheduled to see Rad-onc tomorrow and will need port/IV access prior to starting chemotherapy, pt is unable to get PORT, alternative plan is to get PICC line on Friday and proceed with Gemzar/cisplatin.   --6/18/21: s/p C1D1 &D8 gem/cis with XRT maegan well, mild nausea noted but controlled with prescribed oral antiemetics.  "Pt has noted less hematuria and feels overall "good"   --7/6/21:  Patient is one-week delay for cycle 2 due to transportation difficulties last week.  Patient did maintain most of daily radiation though.  Discussed with patient importance of maintaining chemotherapy appointments and will begin doing weekly labs with Kourtney at [facility].  Patient states feeling pretty good no real side effects current leaf from radiation or chemotherapy to complain of.  Plan is to proceed with cycle 2 day 1 on Thursday.  Return to infusion next Thursday for cycle 2 day 8 and return to clinic in 3 weeks prior to cycle 3  --7/29/21: pt here today to begin cycle 3. He is to complete XRT next week. So far tolerating tx very well. Labs reviewed and no new c/o.   --8/10/21: s/p c1d8, completed xrt this week, doing well, labs reviewed and pt cleared for chemo today.   --9/8/21: pt here in clinic today after 3 week absence due to mother passing. He missed his 4 th of chemo but would like to resume his chemo next week. Labs today reviewed and infusion is notified to call him to schedule C4D1 next week. Pt requesting pain medication refill.   Scans to be scheduled after cycle 4 is completed.   --9/29/21: pt here today after not completing cycle 4 or doing ct scans. Counseled patient regarding need for repeat imaging for active surveillance of disease as well as tapering narcotic pain medication now that he has completed chemo/xrt. Today percocet is changed to Norco 5/325mg #30 and pt understands that this will be his final narcotic prescription provided. Orders placed today for PICC to be pulled at infusion.  He is to RTC in 3 weeks with CT scans.   ==08/03/2023: Patient s/p first carboplatin/gemcitabine cycle.  Reports had some nausea relieved with ondansetron.  Plt 66, will hold 1 week and see patient back with repeat labs  ==08/24/2023: Patient received once cycle Carboplatin/Gemcitabine on 7/27/2023, second cycle held due to " thrombocytopenia with rtc appt made for a week later but patient was unable to make appt due to transportation issues. Pt presented to clinic as walk up with reports of hematuria, sensation of incomplete emptying of bladder. States he saw pcp and was told related to bladder cancer, no bladder infection. SrCr increased from 1.1 to 1.46. He is taking 800 mg ibuprofen 1-2x daily for pain.  Patient also has questions regarding how long he will be on chemotherapy and wanted to be done with all oncologic treatment and in remission by October 2023.  I had conversation with patient that he has stage IV disease and goal of treatment is not curative and patient will be on some form of treatment indefinitely.   ==08/31/23: SrCr Improved, has returned to baseline. Pt received C1D1 Carboplatin/Gemcitabine then did not return to clinic for any further treatment.  Will resume treatment with new cycle.  Reinforced education, no nsaids, encouraged po fluids 6-8 glasses water per day.    ==09/07/23: Chemo well tolerated, pt received fuphila on day 2 of cycle, no s/s infection.  He reports increase in pain, will add MSER 15 mg, educated on avoiding nsaids and mirilax for bowel regimen.  Labs reviewed, pt cleared for chemotherapy  ==10/11/23:  No longer on Morphine as it was giving him homicidal thoughts and he has been evaluated by Psychiatry in the interim who advised him to be off Morphine. He was having a UTI few weeks back and now has completed antibiotics last week. His last Cr function showed decline likely secondary to that. As a precaution we will hold off on Carboplatin this cycle. We will repeat his CMP next week.      ==2. CAD:  -- S/p stent placement 03/2021 and follows cardiology  -- Stable    3. Cancer related pain  ==Continue hydrocodone 10/325 q 8 hours prn breakthrough pain  ==Miralax for bowel regimen prevention opoid induced constipation    Plan:  Restart chemo with Gemzar only this cycle.  Once renal function  resolves, will resume Carboplatin from next cycles.    Total time spent in counseling and discussion was more than 60 minutes. I discussed in detail further management options at this time and discussion on relevant tests, imaging and ancillary tests as part of the management plan.

## 2023-10-12 DIAGNOSIS — G89.3 CANCER RELATED PAIN: ICD-10-CM

## 2023-10-12 DIAGNOSIS — R11.2 CHEMOTHERAPY-INDUCED NAUSEA AND VOMITING: ICD-10-CM

## 2023-10-12 DIAGNOSIS — T45.1X5A CHEMOTHERAPY-INDUCED NAUSEA AND VOMITING: ICD-10-CM

## 2023-10-12 RX ORDER — ONDANSETRON HYDROCHLORIDE 8 MG/1
8 TABLET, FILM COATED ORAL EVERY 8 HOURS PRN
Qty: 60 TABLET | Refills: 6 | Status: CANCELLED | OUTPATIENT
Start: 2023-10-12 | End: 2024-10-11

## 2023-10-13 DIAGNOSIS — R11.2 CHEMOTHERAPY-INDUCED NAUSEA AND VOMITING: ICD-10-CM

## 2023-10-13 DIAGNOSIS — T45.1X5A CHEMOTHERAPY-INDUCED NAUSEA AND VOMITING: ICD-10-CM

## 2023-10-13 RX ORDER — OXYCODONE AND ACETAMINOPHEN 7.5; 325 MG/1; MG/1
1 TABLET ORAL EVERY 12 HOURS PRN
Qty: 60 TABLET | Refills: 0 | OUTPATIENT
Start: 2023-10-13 | End: 2024-10-12

## 2023-10-13 RX ORDER — ONDANSETRON HYDROCHLORIDE 8 MG/1
8 TABLET, FILM COATED ORAL EVERY 8 HOURS PRN
Qty: 60 TABLET | Refills: 6 | OUTPATIENT
Start: 2023-10-13 | End: 2024-10-12

## 2023-10-13 RX ORDER — ONDANSETRON HYDROCHLORIDE 8 MG/1
8 TABLET, FILM COATED ORAL EVERY 8 HOURS PRN
Qty: 60 TABLET | Refills: 6 | Status: SHIPPED | OUTPATIENT
Start: 2023-10-13 | End: 2023-11-16 | Stop reason: SDUPTHER

## 2023-10-18 ENCOUNTER — OFFICE VISIT (OUTPATIENT)
Dept: HEMATOLOGY/ONCOLOGY | Facility: CLINIC | Age: 58
End: 2023-10-18
Payer: MEDICAID

## 2023-10-18 VITALS
HEART RATE: 89 BPM | SYSTOLIC BLOOD PRESSURE: 122 MMHG | HEIGHT: 68 IN | DIASTOLIC BLOOD PRESSURE: 81 MMHG | WEIGHT: 177.19 LBS | BODY MASS INDEX: 26.85 KG/M2 | RESPIRATION RATE: 16 BRPM | OXYGEN SATURATION: 98 %

## 2023-10-18 DIAGNOSIS — C67.8 MALIGNANT NEOPLASM OF OVERLAPPING SITES OF BLADDER: Primary | ICD-10-CM

## 2023-10-18 DIAGNOSIS — R79.89 ELEVATED SERUM CREATININE: ICD-10-CM

## 2023-10-18 LAB
ALBUMIN SERPL BCP-MCNC: 3.3 G/DL (ref 3.4–5)
ALBUMIN/GLOBULIN RATIO: 0.75 RATIO (ref 1.1–1.8)
ALP SERPL-CCNC: 86 U/L (ref 46–116)
ALT SERPL W P-5'-P-CCNC: 30 U/L (ref 12–78)
ANION GAP SERPL CALC-SCNC: 6 MMOL/L (ref 3–11)
AST SERPL-CCNC: 25 U/L (ref 15–37)
BASOPHILS NFR BLD: 0.1 % (ref 0–3)
BILIRUB SERPL-MCNC: 0.2 MG/DL (ref 0–1)
BUN SERPL-MCNC: 20 MG/DL (ref 7–18)
BUN/CREAT SERPL: 14.28 RATIO (ref 7–18)
CALCIUM SERPL-MCNC: 8.7 MG/DL (ref 8.8–10.5)
CHLORIDE SERPL-SCNC: 101 MMOL/L (ref 100–108)
CO2 SERPL-SCNC: 28 MMOL/L (ref 21–32)
CREAT SERPL-MCNC: 1.4 MG/DL (ref 0.7–1.3)
EOSINOPHIL NFR BLD: 2 % (ref 1–3)
ERYTHROCYTE [DISTWIDTH] IN BLOOD BY AUTOMATED COUNT: 17.2 % (ref 12.5–18)
GFR ESTIMATION: 52
GLOBULIN: 4.4 G/DL (ref 2.3–3.5)
GLUCOSE SERPL-MCNC: 101 MG/DL (ref 70–110)
HCT VFR BLD AUTO: 31.9 % (ref 42–52)
HGB BLD-MCNC: 10.5 G/DL (ref 14–18)
LYMPHOCYTES NFR BLD: 21.4 % (ref 25–40)
MCH RBC QN AUTO: 31.6 PG (ref 27–31.2)
MCHC RBC AUTO-ENTMCNC: 32.9 G/DL (ref 31.8–35.4)
MCV RBC AUTO: 96.1 FL (ref 80–97)
MONOCYTES NFR BLD: 8.8 % (ref 1–15)
NEUTROPHILS # BLD AUTO: 5.07 10*3/UL (ref 1.8–7.7)
NEUTROPHILS NFR BLD: 66.9 % (ref 37–80)
NUCLEATED RED BLOOD CELLS: 0 %
PLATELETS: 178 10*3/UL (ref 142–424)
POTASSIUM SERPL-SCNC: 4.4 MMOL/L (ref 3.6–5.2)
PROT SERPL-MCNC: 7.7 G/DL (ref 6.4–8.2)
RBC # BLD AUTO: 3.32 10*6/UL (ref 4.7–6.1)
SODIUM BLD-SCNC: 135 MMOL/L (ref 135–145)
WBC # BLD: 7.6 10*3/UL (ref 4.6–10.2)

## 2023-10-18 PROCEDURE — 1159F MED LIST DOCD IN RCRD: CPT | Mod: CPTII,S$GLB,, | Performed by: NURSE PRACTITIONER

## 2023-10-18 PROCEDURE — 1160F RVW MEDS BY RX/DR IN RCRD: CPT | Mod: CPTII,S$GLB,, | Performed by: NURSE PRACTITIONER

## 2023-10-18 PROCEDURE — 3008F BODY MASS INDEX DOCD: CPT | Mod: CPTII,S$GLB,, | Performed by: NURSE PRACTITIONER

## 2023-10-18 PROCEDURE — 4010F ACE/ARB THERAPY RXD/TAKEN: CPT | Mod: CPTII,S$GLB,, | Performed by: NURSE PRACTITIONER

## 2023-10-18 PROCEDURE — 3074F SYST BP LT 130 MM HG: CPT | Mod: CPTII,S$GLB,, | Performed by: NURSE PRACTITIONER

## 2023-10-18 PROCEDURE — 3079F DIAST BP 80-89 MM HG: CPT | Mod: CPTII,S$GLB,, | Performed by: NURSE PRACTITIONER

## 2023-10-18 PROCEDURE — 3074F PR MOST RECENT SYSTOLIC BLOOD PRESSURE < 130 MM HG: ICD-10-PCS | Mod: CPTII,S$GLB,, | Performed by: NURSE PRACTITIONER

## 2023-10-18 PROCEDURE — 99214 PR OFFICE/OUTPT VISIT, EST, LEVL IV, 30-39 MIN: ICD-10-PCS | Mod: S$GLB,,, | Performed by: NURSE PRACTITIONER

## 2023-10-18 PROCEDURE — 4010F PR ACE/ARB THEARPY RXD/TAKEN: ICD-10-PCS | Mod: CPTII,S$GLB,, | Performed by: NURSE PRACTITIONER

## 2023-10-18 PROCEDURE — 3079F PR MOST RECENT DIASTOLIC BLOOD PRESSURE 80-89 MM HG: ICD-10-PCS | Mod: CPTII,S$GLB,, | Performed by: NURSE PRACTITIONER

## 2023-10-18 PROCEDURE — 99214 OFFICE O/P EST MOD 30 MIN: CPT | Mod: S$GLB,,, | Performed by: NURSE PRACTITIONER

## 2023-10-18 PROCEDURE — 3008F PR BODY MASS INDEX (BMI) DOCUMENTED: ICD-10-PCS | Mod: CPTII,S$GLB,, | Performed by: NURSE PRACTITIONER

## 2023-10-18 PROCEDURE — 1159F PR MEDICATION LIST DOCUMENTED IN MEDICAL RECORD: ICD-10-PCS | Mod: CPTII,S$GLB,, | Performed by: NURSE PRACTITIONER

## 2023-10-18 PROCEDURE — 1160F PR REVIEW ALL MEDS BY PRESCRIBER/CLIN PHARMACIST DOCUMENTED: ICD-10-PCS | Mod: CPTII,S$GLB,, | Performed by: NURSE PRACTITIONER

## 2023-10-18 RX ORDER — HEPARIN 100 UNIT/ML
500 SYRINGE INTRAVENOUS
Status: CANCELLED | OUTPATIENT
Start: 2023-10-18

## 2023-10-18 RX ORDER — SODIUM CHLORIDE 0.9 % (FLUSH) 0.9 %
10 SYRINGE (ML) INJECTION
Status: CANCELLED | OUTPATIENT
Start: 2023-10-18

## 2023-10-18 RX ORDER — ONDANSETRON 2 MG/ML
8 INJECTION INTRAMUSCULAR; INTRAVENOUS
Status: CANCELLED | OUTPATIENT
Start: 2023-10-18

## 2023-10-18 NOTE — PROGRESS NOTES
MEDICAL ONCOLOGY FOLLOW UP  NOTE    Patient ID: Chucho Mack is a 58 y.o. male.    Chief Complaint: Bladder cancer    HPI:  Patient is a 58-year-old male with past medical history of anxiety, gastroesophageal reflux disease, hyperlipidemia, paranoid schizophrenia who  recently underwent transurethral resection of large bladder tumor > 8 cm by Urology and pathology was conclusive of bladder cancer.  Please see pathology report below. He presents to medical oncology clinic today for further evaluation. Reports hematuria post TURBT but doing better since then.    BLADDER TUMOR, TRANSURETHRAL RESECTION: 12/23/2020    - ADENOCARCINOMA, INVASIVE, MODERATELY DIFFERENTIATED.   - TUMOR INVADES MUSCULARIS PROPRIA.   - NEGATIVE FOR LYMPHOVASCULAR INVASION.   - SEE COMMENT.   Comment:   To further evaluate the specimen, the following immunohistochemical stains   were performed by [facility] laboratory, on block 1H with   adequate controls and with interpretation as indicated:      CK7:      negative 0030 S>     CK20:      positive      CDX2:      positive      p63:      negative   Primary adenocarcinomas of the bladder  may show significant morphologic and   immunohistochemical overlap with adenocarcinomas from other primary sites.   In this instance, the tumor shows striking morphologic and   immunohistochemical similarity to primary colorectal adenocarcinoma.   Careful correlation with endoscopic and radiographic findings is necessary   for determination of the definitive primary site.     Imaging:      CT Abdomen and pelvis: 10/7/2020    - Partially calcified soft tissue intraluminal lesion of the urinary bladder.  Malignancy least differential.  - heterogeneously enhancing complex 2.2 cm lesion of the right kidney.  - Cyst of the bilateral kidneys.  - hepatic steatosis with focal fatty sparing.  - colonic diverticular disease    CT chest w/o contrast: 1/25/2021  1.  Fatty infiltration of the liver.         2.  No other acute pathology    CT scan A/P: 5/13/2021    - The bladder mass is increased in size compared with CT from August 2020.  Is much carlos of the adjacent aurora-serosal fat may reflect invasion.  - there are several bilateral renal hypodensities, most of which are too small to characterize in terms of attenuation.  This is stable compared with prior exam.  The largest is a cyst in the left lower pole.  One that is indeterminate in the mid to lower pole the right kidney is probably large enough to characterize a cystic or solid by ultrasound.  Consider ultrasound of this lesion is not been evaluated previously.    Labs:  Lab Results   Component Value Date    WBC 9.5 10/05/2023    HGB 10.7 (L) 10/05/2023    HCT 32.8 (L) 10/05/2023    MCV 96.5 10/05/2023    LABPLAT 357 10/05/2023      Platelets   Date Value Ref Range Status   10/05/2023 357 142 - 424 10*3/uL Final     CMP  Sodium   Date Value Ref Range Status   10/05/2023 137 135 - 145 mmol/L Final     Potassium   Date Value Ref Range Status   10/05/2023 4.3 3.6 - 5.2 mmol/L Final     Chloride   Date Value Ref Range Status   10/05/2023 99 (L) 100 - 108 mmol/L Final     CO2   Date Value Ref Range Status   10/05/2023 29 21 - 32 mmol/L Final     Glucose   Date Value Ref Range Status   10/05/2023 85 70 - 110 mg/dL Final     BUN   Date Value Ref Range Status   10/05/2023 23 (H) 7 - 18 mg/dL Final     Creatinine   Date Value Ref Range Status   10/05/2023 1.72 (H) 0.70 - 1.30 mg/dL Final     Calcium   Date Value Ref Range Status   10/05/2023 9.5 8.8 - 10.5 mg/dL Final     Total Protein   Date Value Ref Range Status   10/05/2023 7.9 6.4 - 8.2 g/dL Final     Albumin   Date Value Ref Range Status   10/05/2023 3.4 3.4 - 5.0 g/dL Final     Total Bilirubin   Date Value Ref Range Status   10/05/2023 0.3 0.0 - 1.0 mg/dL Final     Alkaline Phosphatase   Date Value Ref Range Status   10/05/2023 96 46 - 116 U/L Final     AST   Date Value Ref Range Status   10/05/2023 20 15 -  37 U/L Final     ALT   Date Value Ref Range Status   10/05/2023 20 12 - 78 U/L Final     Anion Gap   Date Value Ref Range Status   10/05/2023 9.0 3.0 - 11.0 mmol/L Final            Past Medical History:   Diagnosis Date    Allergy     Anxiety     Bladder cancer 2021    Coronary artery disease     Depression     Essential (primary) hypertension     GERD (gastroesophageal reflux disease)     Hypercholesterolemia     Paranoid schizophrenia     Type II diabetes mellitus     Urinary tract infection      Family History   Problem Relation Age of Onset    Coronary artery disease Mother     No Known Problems Father     No Known Problems Sister     No Known Problems Sister     No Known Problems Sister     No Known Problems Sister     No Known Problems Sister     Coronary artery disease Brother     Coronary artery disease Brother     Cancer Maternal Grandmother     Skin cancer Maternal Grandmother     Cancer Maternal Aunt     No Known Problems Maternal Uncle     No Known Problems Maternal Uncle     No Known Problems Maternal Uncle     No Known Problems Maternal Uncle     No Known Problems Maternal Uncle     No Known Problems Maternal Uncle     No Known Problems Maternal Uncle     Colon cancer Neg Hx     Colon polyps Neg Hx     Crohn's disease Neg Hx     Liver disease Neg Hx      Social History     Socioeconomic History    Marital status:    Tobacco Use    Smoking status: Former     Current packs/day: 0.00     Average packs/day: 1.5 packs/day for 44.0 years (65.9 ttl pk-yrs)     Types: Cigarettes     Start date: 1977     Quit date: 2020     Years since quittin.8     Passive exposure: Past    Smokeless tobacco: Never    Tobacco comments:     3 cigarettes a day currently 23 DF.    Substance and Sexual Activity    Alcohol use: Not Currently    Drug use: Never     Past Surgical History:   Procedure Laterality Date    ANKLE SURGERY      BLADDER TUMOR EXCISION      2X 2021/ Dec 2022    CORONARY  ANGIOPLASTY WITH STENT PLACEMENT      CYSTOSCOPY      FOOT FRACTURE SURGERY      HAND SURGERY Bilateral     TURBT (TRANSURETHRAL RESECTION OF BLADDER TUMOR)  05/01/2023    WRIST SURGERY           Review of systems:  Review of Systems   Constitutional:  Negative for activity change, appetite change, chills, diaphoresis, fatigue and unexpected weight change.   HENT:  Negative for congestion, facial swelling, hearing loss, mouth sores, trouble swallowing and voice change.    Eyes:  Negative for photophobia, pain, discharge and itching.   Respiratory:  Negative for apnea, cough, choking, chest tightness and shortness of breath.    Cardiovascular:  Negative for chest pain, palpitations and leg swelling.   Gastrointestinal:  Negative for abdominal distention, abdominal pain, anal bleeding and blood in stool.   Endocrine: Negative for cold intolerance, heat intolerance, polydipsia and polyphagia.   Genitourinary:  Negative for difficulty urinating, dysuria, flank pain and hematuria.   Musculoskeletal:  Negative for arthralgias, back pain, joint swelling, myalgias, neck pain and neck stiffness.   Skin:  Negative for color change, pallor and wound.   Allergic/Immunologic: Negative for environmental allergies, food allergies and immunocompromised state.   Neurological:  Negative for dizziness, seizures, facial asymmetry, speech difficulty, light-headedness, numbness and headaches.   Hematological:  Negative for adenopathy. Does not bruise/bleed easily.   Psychiatric/Behavioral:  Negative for agitation, behavioral problems, confusion, decreased concentration and sleep disturbance.              Physical Exam  Vitals and nursing note reviewed.   Constitutional:       General: He is not in acute distress.     Appearance: Normal appearance. He is not ill-appearing.   HENT:      Head: Normocephalic and atraumatic.      Nose: No congestion or rhinorrhea.   Eyes:      General: No scleral icterus.     Extraocular Movements:  Extraocular movements intact.      Pupils: Pupils are equal, round, and reactive to light.   Cardiovascular:      Rate and Rhythm: Normal rate and regular rhythm.      Pulses: Normal pulses.      Heart sounds: Normal heart sounds. No murmur heard.     No gallop.   Pulmonary:      Effort: Pulmonary effort is normal. No respiratory distress.      Breath sounds: Normal breath sounds. No stridor. No wheezing or rhonchi.   Abdominal:      General: Bowel sounds are normal. There is no distension.      Palpations: There is no mass.      Tenderness: There is no abdominal tenderness. There is no guarding.   Musculoskeletal:         General: No swelling, tenderness, deformity or signs of injury. Normal range of motion.      Cervical back: Normal range of motion and neck supple. No rigidity. No muscular tenderness.      Right lower leg: No edema.      Left lower leg: No edema.   Skin:     General: Skin is warm.      Coloration: Skin is not jaundiced or pale.      Findings: No bruising or lesion.   Neurological:      General: No focal deficit present.      Mental Status: He is alert and oriented to person, place, and time.      Cranial Nerves: No cranial nerve deficit.      Sensory: No sensory deficit.      Motor: No weakness.      Gait: Gait normal.   Psychiatric:         Mood and Affect: Mood normal.         Behavior: Behavior normal.         Thought Content: Thought content normal.        Vitals:    10/18/23 1129   BP: 122/81   Pulse: 89   Resp: 16           Body surface area is 1.96 meters squared.    Assessment/Plan:      ECOG 1    1. Invasive moderately differentiated muscle invasive bladder tumor s/p TURBT 12/21/2020: Stage IIIA at presentation now with Stage IV  [facility]us NGS: TP53, ARID1B, KRAS G13D, SMAD4, HIGH TMB noted patient eligible for Keytruda in the future     4/26/23:  Patient presenting to this clinic after a gap of  2 years.  He reports several logistic issues for missing his appointments.  More recently he  was seen by Urology for recurrent bladder cancer and plan for TURBT was made.  However it does not show patient has followed back with Urology.  He also reports getting CT scan done at an outside hospital but I do not have any reports available at this time.  I discussed with him about his several no-shows over the last 2 years and the need to look at dose restaging scan before we decide on our next steps.  I strongly encouraged him to keep his upcoming TURBT appointment with Urology and also showed him the note from urology team where they have attempted to contact him.  == 5/24/23:  Patient underwent repeat TURBT and per Urology note he had multiple recurrences in the bladder which were found to be invasive adenocarcinoma high-grade.  Please see the discussion above where patient has repeatedly missed chemo/radiation appointments in the past and more recently presented after a gap of 2 years.  Patient was seen on 05/11/2023 by Dr. Bro post TURBT and he was referred back to Oncology as he had persistent disease after treatment.  Patient was also discussed in tumor board in I am unsure how we can reliably give any kind of treatment to a patient who does not show up persistently.  I will order a PET scan for restaging and to evaluate the lung nodules noted on recent CT scan.  For a patient who was repeatedly noncompliant and does not show up and disappears during chemotherapy I do not feel I have much to offer at this time.  If he is confirmed stage IV based on PET scan results then palliative gemcitabine and carboplatin followed by avelumab maintenance therapy is a consideration.  PET scan has been ordered for restaging. Also depending on his stage and symptoms-- palliative XRT could have a role but he has had multiple no shows with Rad-onc in the past as well.  == 6/21/23:  PET scan for restaging showed a 2.8 x 5.2 cm mass in the anterior aspect of urinary bladder cavity with foci of amorphous calcification.   Bilateral few scattered lung nodules with increased FDG activity consistent with metastasis.  Soft tissue fullness with increased FDG activity of left oropharynx.  Patient is status post palliative localized re-irradiation secondary to urinary obstructive symptoms and bleeding.  Discussed with patient in detail future management options for palliative chemo immunotherapy for patients who are cis-platinum eligible and will send prior authorization request for gemcitabine and carboplatin him to be followed by avelumab maintenance therapy.  Will also send Tempus testing at this time  ==07/13/2023 Tempus PDL-1 negative, remaining tempus testing pending.   Pt here today to discuss upcoming chemotherapy, side effect profile, risk versus benefits, and expected outcomes have been discussed today. All questions and concerns answered and consents have been signed.  Labs drawn on 7/11/23. Will start chemotherapy on 7/20/2023.  Port placed, skin wnl, no erythema  == 7/27/23: here today to begin carbo/gemzar. Tempus testing discussed, see above.     History of  St III A Bladder Cancer Diagnosed 12/2020:     -- Discussed with patient further management options for muscle invasive bladder cancer s/p TURBT and the pathology report showing findings with a possible bladder cancer vs colo-rectal cancer primary. If not deemed bladder cancer than no specific role of radiation and will be treated with as metastatic colon cancer.  -- Discussed in TMB meeting 1/21/2020 and consensus decision to obtain C-scopy and if no mass evident on C-scopy, pathology would send for further testing/ ? Second opinion. Referral to GI was placed and he is scheduled to get C-scopy 1/26/2020. C-scopy did not reveal any suspicious lesions which patient delayed getting C-scopy for a long time  -- 3/11/2021: Patient did not present back to clinic until today after his last visit, end of Jan 2021. He also missed his GI appointment which was scheduled urgently at  the time. He reports going through family stressors. I discussed with him and counseled about his non-compliance in coming to appointments. Will obtain restaging scans and will await his C-scopy results. Again counseled not to miss appointments.  -- 5/25/2021:  Patient presents to clinic after a delay of more than 2 months and again does not seem to have a valid reason for causing delay in his own care.  He reports having a recent visit to ER where he was noted to have a urinary tract infection and he also underwent CT scan A/P which just showed increase in size of the bladder mass to approximately 5-6 cm from 4 cm.  I discussed with patient in detail about future management options and also pathology confirming this to be a bladder tumor with pure enteric colonic morphology but no GI primary.  I understand this is a very unreliable patient and might not show up for chemotherapy, hence with the support of our staff we will  involve his family.  == Patient reports that he does not have the resources to undergo any type of radical surgery including a radical cystectomy as he already have transport issues even coming to clinic here and would be unable to go anywhere outside the city.  He would like to pursue with bladder preservation with concurrent chemoradiotherapy. I discussed with him that trimodality therapy (TMT) incorporating maximal TURBT followed by radiation therapy (RT) with concurrent radiosensitizing chemotherapy can be a comparably effective regimen to preserve a functioning bladder in well-selected patients with muscle-invasive bladder cancer.   == Will use 5FU Mitomycin with XRT. He is scheduled to see Rad-onc tomorrow and will need port/IV access prior to starting chemotherapy, pt is unable to get PORT, alternative plan is to get PICC line on Friday and proceed with Gemzar/cisplatin.   --6/18/21: s/p C1D1 &D8 gem/cis with XRT maegan well, mild nausea noted but controlled with prescribed oral antiemetics.  "Pt has noted less hematuria and feels overall "good"   --7/6/21:  Patient is one-week delay for cycle 2 due to transportation difficulties last week.  Patient did maintain most of daily radiation though.  Discussed with patient importance of maintaining chemotherapy appointments and will begin doing weekly labs with Kourtney at [facility].  Patient states feeling pretty good no real side effects current leaf from radiation or chemotherapy to complain of.  Plan is to proceed with cycle 2 day 1 on Thursday.  Return to infusion next Thursday for cycle 2 day 8 and return to clinic in 3 weeks prior to cycle 3  --7/29/21: pt here today to begin cycle 3. He is to complete XRT next week. So far tolerating tx very well. Labs reviewed and no new c/o.   --8/10/21: s/p c1d8, completed xrt this week, doing well, labs reviewed and pt cleared for chemo today.   --9/8/21: pt here in clinic today after 3 week absence due to mother passing. He missed his 4 th of chemo but would like to resume his chemo next week. Labs today reviewed and infusion is notified to call him to schedule C4D1 next week. Pt requesting pain medication refill.   Scans to be scheduled after cycle 4 is completed.   --9/29/21: pt here today after not completing cycle 4 or doing ct scans. Counseled patient regarding need for repeat imaging for active surveillance of disease as well as tapering narcotic pain medication now that he has completed chemo/xrt. Today percocet is changed to Norco 5/325mg #30 and pt understands that this will be his final narcotic prescription provided. Orders placed today for PICC to be pulled at infusion.  He is to RTC in 3 weeks with CT scans.   ==08/03/2023: Patient s/p first carboplatin/gemcitabine cycle.  Reports had some nausea relieved with ondansetron.  Plt 66, will hold 1 week and see patient back with repeat labs  ==08/24/2023: Patient received once cycle Carboplatin/Gemcitabine on 7/27/2023, second cycle held due to " thrombocytopenia with rtc appt made for a week later but patient was unable to make appt due to transportation issues. Pt presented to clinic as walk up with reports of hematuria, sensation of incomplete emptying of bladder. States he saw pcp and was told related to bladder cancer, no bladder infection. SrCr increased from 1.1 to 1.46. He is taking 800 mg ibuprofen 1-2x daily for pain.  Patient also has questions regarding how long he will be on chemotherapy and wanted to be done with all oncologic treatment and in remission by October 2023.  I had conversation with patient that he has stage IV disease and goal of treatment is not curative and patient will be on some form of treatment indefinitely.   ==08/31/23: SrCr Improved, has returned to baseline. Pt received C1D1 Carboplatin/Gemcitabine then did not return to clinic for any further treatment.  Will resume treatment with new cycle.  Reinforced education, no nsaids, encouraged po fluids 6-8 glasses water per day.    ==09/07/23: Chemo well tolerated, pt received fuphila on day 2 of cycle, no s/s infection.  He reports increase in pain, will add MSER 15 mg, educated on avoiding nsaids and mirilax for bowel regimen.  Labs reviewed, pt cleared for chemotherapy  ==10/11/23:  No longer on Morphine as it was giving him homicidal thoughts and he has been evaluated by Psychiatry in the interim who advised him to be off Morphine. He was having a UTI few weeks back and now has completed antibiotics last week. His last Cr function showed decline likely secondary to that. As a precaution we will hold off on Carboplatin this cycle. We will repeat his CMP next week.  == 10/18/23: here today for cycle 3 day 8 gemzar (carbo on hold this cycle due to elevated SrCr). Labs reviewed. Cleared for treatment. SrCr continues to improve.     ==2. CAD:  -- S/p stent placement 03/2021 and follows cardiology  -- Stable    3. Cancer related pain  ==Continue hydrocodone 10/325 q 8 hours prn  breakthrough pain  ==Miralax for bowel regimen prevention opoid induced constipation    Plan:  Restart chemo with Gemzar C3D8 only this cycle.  Once renal function resolves, will resume Carboplatin from next cycles.  RTC in 2 weeks for cycle 4 D 1   He will be out of town 11/12/23 for 1 week    Total time spent in counseling and discussion was more than 60 minutes. I discussed in detail further management options at this time and discussion on relevant tests, imaging and ancillary tests as part of the management plan.

## 2023-10-26 LAB
ALBUMIN SERPL BCP-MCNC: 3 G/DL (ref 3.4–5)
ALBUMIN/GLOBULIN RATIO: 0.88 RATIO (ref 1.1–1.8)
ALP SERPL-CCNC: 98 U/L (ref 46–116)
ALT SERPL W P-5'-P-CCNC: 48 U/L (ref 12–78)
ANION GAP SERPL CALC-SCNC: 5 MMOL/L (ref 3–11)
AST SERPL-CCNC: 30 U/L (ref 15–37)
BASOPHILS NFR BLD: 0.2 % (ref 0–3)
BILIRUB SERPL-MCNC: 0.2 MG/DL (ref 0–1)
BUN SERPL-MCNC: 13 MG/DL (ref 7–18)
BUN/CREAT SERPL: 11.01 RATIO (ref 7–18)
CALCIUM SERPL-MCNC: 8.4 MG/DL (ref 8.8–10.5)
CHLORIDE SERPL-SCNC: 104 MMOL/L (ref 100–108)
CO2 SERPL-SCNC: 31 MMOL/L (ref 21–32)
CREAT SERPL-MCNC: 1.18 MG/DL (ref 0.7–1.3)
EOSINOPHIL NFR BLD: 1.8 % (ref 1–3)
ERYTHROCYTE [DISTWIDTH] IN BLOOD BY AUTOMATED COUNT: 17.2 % (ref 12.5–18)
GFR ESTIMATION: > 60
GLOBULIN: 3.4 G/DL (ref 2.3–3.5)
GLUCOSE SERPL-MCNC: 99 MG/DL (ref 70–110)
HCT VFR BLD AUTO: 29.4 % (ref 42–52)
HGB BLD-MCNC: 9.6 G/DL (ref 14–18)
LYMPHOCYTES NFR BLD: 18.9 % (ref 25–40)
MCH RBC QN AUTO: 32.2 PG (ref 27–31.2)
MCHC RBC AUTO-ENTMCNC: 32.7 G/DL (ref 31.8–35.4)
MCV RBC AUTO: 98.7 FL (ref 80–97)
MONOCYTES NFR BLD: 12.4 % (ref 1–15)
NEUTROPHILS # BLD AUTO: 4.08 10*3/UL (ref 1.8–7.7)
NEUTROPHILS NFR BLD: 65.7 % (ref 37–80)
NUCLEATED RED BLOOD CELLS: 0 %
PLATELETS: 77 10*3/UL (ref 142–424)
POTASSIUM SERPL-SCNC: 3.8 MMOL/L (ref 3.6–5.2)
PROT SERPL-MCNC: 6.4 G/DL (ref 6.4–8.2)
RBC # BLD AUTO: 2.98 10*6/UL (ref 4.7–6.1)
SMALL PLATELETS BLD QL SMEAR: NORMAL
SODIUM BLD-SCNC: 140 MMOL/L (ref 135–145)
WBC # BLD: 6.2 10*3/UL (ref 4.6–10.2)

## 2023-11-01 ENCOUNTER — TELEPHONE (OUTPATIENT)
Dept: HEMATOLOGY/ONCOLOGY | Facility: CLINIC | Age: 58
End: 2023-11-01

## 2023-11-15 DIAGNOSIS — C67.8 MALIGNANT NEOPLASM OF OVERLAPPING SITES OF BLADDER: Primary | ICD-10-CM

## 2023-11-16 ENCOUNTER — OFFICE VISIT (OUTPATIENT)
Dept: HEMATOLOGY/ONCOLOGY | Facility: CLINIC | Age: 58
End: 2023-11-16
Payer: MEDICAID

## 2023-11-16 ENCOUNTER — DOCUMENTATION ONLY (OUTPATIENT)
Dept: HEMATOLOGY/ONCOLOGY | Facility: CLINIC | Age: 58
End: 2023-11-16

## 2023-11-16 ENCOUNTER — TELEPHONE (OUTPATIENT)
Dept: HEMATOLOGY/ONCOLOGY | Facility: CLINIC | Age: 58
End: 2023-11-16

## 2023-11-16 VITALS
HEART RATE: 80 BPM | WEIGHT: 174.38 LBS | OXYGEN SATURATION: 97 % | BODY MASS INDEX: 26.43 KG/M2 | SYSTOLIC BLOOD PRESSURE: 120 MMHG | DIASTOLIC BLOOD PRESSURE: 88 MMHG | RESPIRATION RATE: 18 BRPM | HEIGHT: 68 IN

## 2023-11-16 DIAGNOSIS — C67.8 MALIGNANT NEOPLASM OF OVERLAPPING SITES OF BLADDER: Primary | ICD-10-CM

## 2023-11-16 DIAGNOSIS — N30.01 ACUTE CYSTITIS WITH HEMATURIA: ICD-10-CM

## 2023-11-16 DIAGNOSIS — Z91.148 NONCOMPLIANCE WITH MEDICATION REGIMEN: ICD-10-CM

## 2023-11-16 DIAGNOSIS — R31.9 HEMATURIA, UNSPECIFIED TYPE: ICD-10-CM

## 2023-11-16 DIAGNOSIS — R91.1 SOLITARY PULMONARY NODULE: ICD-10-CM

## 2023-11-16 DIAGNOSIS — T45.1X5A CHEMOTHERAPY-INDUCED NAUSEA AND VOMITING: ICD-10-CM

## 2023-11-16 DIAGNOSIS — G89.3 CANCER RELATED PAIN: ICD-10-CM

## 2023-11-16 DIAGNOSIS — G89.3 CANCER RELATED PAIN: Primary | ICD-10-CM

## 2023-11-16 DIAGNOSIS — R11.2 CHEMOTHERAPY-INDUCED NAUSEA AND VOMITING: ICD-10-CM

## 2023-11-16 LAB
ALBUMIN SERPL BCP-MCNC: 3.3 G/DL (ref 3.4–5)
ALBUMIN/GLOBULIN RATIO: 0.87 RATIO (ref 1.1–1.8)
ALP SERPL-CCNC: 82 U/L (ref 46–116)
ALT SERPL W P-5'-P-CCNC: 20 U/L (ref 12–78)
ANION GAP SERPL CALC-SCNC: 6 MMOL/L (ref 3–11)
APPEARANCE, UA: CLEAR
AST SERPL-CCNC: 19 U/L (ref 15–37)
BACTERIA SPEC CULT: ABNORMAL /HPF
BARBITURATE SCREEN URINE: NEGATIVE
BASOPHILS NFR BLD: 0.1 % (ref 0–3)
BENZODIAZ UR QL SCN: NEGATIVE
BENZOYLECGONINE, URINE: NEGATIVE
BILIRUB SERPL-MCNC: 0.3 MG/DL (ref 0–1)
BILIRUB UR QL STRIP: NEGATIVE MG/DL
BUN SERPL-MCNC: 9 MG/DL (ref 7–18)
BUN/CREAT SERPL: 7.82 RATIO (ref 7–18)
CALCIUM SERPL-MCNC: 8.8 MG/DL (ref 8.8–10.5)
CANNABINOID SCREEN URINE: NEGATIVE
CHLORIDE SERPL-SCNC: 99 MMOL/L (ref 100–108)
CLARITHRO TITR SBT: NEGATIVE {TITER}
CO2 SERPL-SCNC: 31 MMOL/L (ref 21–32)
COLOR UR: ABNORMAL
CREAT SERPL-MCNC: 1.15 MG/DL (ref 0.7–1.3)
EOSINOPHIL NFR BLD: 1.5 % (ref 1–3)
ERYTHROCYTE [DISTWIDTH] IN BLOOD BY AUTOMATED COUNT: 16.3 % (ref 12.5–18)
GFR ESTIMATION: > 60
GLOBULIN: 3.8 G/DL (ref 2.3–3.5)
GLUCOSE (UA): NORMAL MG/DL
GLUCOSE SERPL-MCNC: 105 MG/DL (ref 70–110)
HCT VFR BLD AUTO: 36.4 % (ref 42–52)
HGB BLD-MCNC: 11.6 G/DL (ref 14–18)
HGB UR QL STRIP: 50 /UL
KETONES UR QL STRIP: NEGATIVE MG/DL
LEUKOCYTE ESTERASE UR QL STRIP: 100 /UL
LYMPHOCYTES NFR BLD: 18.4 % (ref 25–40)
MCH RBC QN AUTO: 31.4 PG (ref 27–31.2)
MCHC RBC AUTO-ENTMCNC: 31.9 G/DL (ref 31.8–35.4)
MCV RBC AUTO: 98.6 FL (ref 80–97)
METHADONE UR QL SCN: NEGATIVE
MONOCYTES NFR BLD: 10.7 % (ref 1–15)
NEUTROPHILS # BLD AUTO: 6.04 10*3/UL (ref 1.8–7.7)
NEUTROPHILS NFR BLD: 68.6 % (ref 37–80)
NITRITE UR QL STRIP: NEGATIVE
NUCLEATED RED BLOOD CELLS: 0 %
OPIATES UR QL SCN: NEGATIVE
PCP UR QL SCN: NEGATIVE
PH UR STRIP: 6 PH (ref 5–9)
PH, URINE (TOX): 6 (ref 5–8)
PLATELETS: 293 10*3/UL (ref 142–424)
POTASSIUM SERPL-SCNC: 4.3 MMOL/L (ref 3.6–5.2)
PROT SERPL-MCNC: 7.1 G/DL (ref 6.4–8.2)
PROT UR QL STRIP: 100 MG/DL
RBC # BLD AUTO: 3.69 10*6/UL (ref 4.7–6.1)
RBC #/AREA URNS HPF: ABNORMAL /HPF (ref 0–2)
SERVICE COMMENT 03: ABNORMAL
SODIUM BLD-SCNC: 136 MMOL/L (ref 135–145)
SP GR UR STRIP: 1.01 (ref 1–1.03)
SPECIMEN COLLECTION METHOD, URINE: ABNORMAL
SQUAMOUS EPITHELIAL, UA: ABNORMAL /LPF
UROBILINOGEN UR STRIP-ACNC: NORMAL MG/DL
WBC # BLD: 8.8 10*3/UL (ref 4.6–10.2)
WBC #/AREA URNS HPF: ABNORMAL /HPF (ref 0–5)

## 2023-11-16 PROCEDURE — 3079F PR MOST RECENT DIASTOLIC BLOOD PRESSURE 80-89 MM HG: ICD-10-PCS | Mod: CPTII,S$GLB,, | Performed by: NURSE PRACTITIONER

## 2023-11-16 PROCEDURE — 3079F DIAST BP 80-89 MM HG: CPT | Mod: CPTII,S$GLB,, | Performed by: NURSE PRACTITIONER

## 2023-11-16 PROCEDURE — 99215 OFFICE O/P EST HI 40 MIN: CPT | Mod: S$GLB,,, | Performed by: NURSE PRACTITIONER

## 2023-11-16 PROCEDURE — 4010F PR ACE/ARB THEARPY RXD/TAKEN: ICD-10-PCS | Mod: CPTII,S$GLB,, | Performed by: NURSE PRACTITIONER

## 2023-11-16 PROCEDURE — 3008F BODY MASS INDEX DOCD: CPT | Mod: CPTII,S$GLB,, | Performed by: NURSE PRACTITIONER

## 2023-11-16 PROCEDURE — 1159F PR MEDICATION LIST DOCUMENTED IN MEDICAL RECORD: ICD-10-PCS | Mod: CPTII,S$GLB,, | Performed by: NURSE PRACTITIONER

## 2023-11-16 PROCEDURE — 3008F PR BODY MASS INDEX (BMI) DOCUMENTED: ICD-10-PCS | Mod: CPTII,S$GLB,, | Performed by: NURSE PRACTITIONER

## 2023-11-16 PROCEDURE — 3074F SYST BP LT 130 MM HG: CPT | Mod: CPTII,S$GLB,, | Performed by: NURSE PRACTITIONER

## 2023-11-16 PROCEDURE — 99215 PR OFFICE/OUTPT VISIT, EST, LEVL V, 40-54 MIN: ICD-10-PCS | Mod: S$GLB,,, | Performed by: NURSE PRACTITIONER

## 2023-11-16 PROCEDURE — 4010F ACE/ARB THERAPY RXD/TAKEN: CPT | Mod: CPTII,S$GLB,, | Performed by: NURSE PRACTITIONER

## 2023-11-16 PROCEDURE — 3074F PR MOST RECENT SYSTOLIC BLOOD PRESSURE < 130 MM HG: ICD-10-PCS | Mod: CPTII,S$GLB,, | Performed by: NURSE PRACTITIONER

## 2023-11-16 PROCEDURE — 1159F MED LIST DOCD IN RCRD: CPT | Mod: CPTII,S$GLB,, | Performed by: NURSE PRACTITIONER

## 2023-11-16 RX ORDER — OXYCODONE AND ACETAMINOPHEN 7.5; 325 MG/1; MG/1
1 TABLET ORAL EVERY 12 HOURS PRN
Qty: 14 TABLET | Refills: 0 | Status: SHIPPED | OUTPATIENT
Start: 2023-11-16 | End: 2024-11-15

## 2023-11-16 RX ORDER — ONDANSETRON HYDROCHLORIDE 8 MG/1
8 TABLET, FILM COATED ORAL EVERY 8 HOURS PRN
Qty: 60 TABLET | Refills: 6 | Status: SHIPPED | OUTPATIENT
Start: 2023-11-16 | End: 2024-11-15

## 2023-11-16 RX ORDER — CIPROFLOXACIN 500 MG/1
500 TABLET ORAL 2 TIMES DAILY
Qty: 20 TABLET | Refills: 0 | Status: SHIPPED | OUTPATIENT
Start: 2023-11-16 | End: 2023-11-26

## 2023-11-16 RX ORDER — OXYCODONE AND ACETAMINOPHEN 7.5; 325 MG/1; MG/1
1 TABLET ORAL EVERY 12 HOURS PRN
Qty: 30 TABLET | Refills: 0 | Status: SHIPPED | OUTPATIENT
Start: 2023-11-16 | End: 2023-11-16 | Stop reason: DRUGHIGH

## 2023-11-16 NOTE — PROGRESS NOTES
MEDICAL ONCOLOGY FOLLOW UP  NOTE    Patient ID: Chucho Mack is a 58 y.o. male.    Chief Complaint: Bladder cancer    HPI:  Patient is a 58-year-old male with past medical history of anxiety, gastroesophageal reflux disease, hyperlipidemia, paranoid schizophrenia who  recently underwent transurethral resection of large bladder tumor > 8 cm by Urology and pathology was conclusive of bladder cancer.  Please see pathology report below. He presents to medical oncology clinic today for further evaluation. Reports hematuria post TURBT but doing better since then.    BLADDER TUMOR, TRANSURETHRAL RESECTION: 12/23/2020    - ADENOCARCINOMA, INVASIVE, MODERATELY DIFFERENTIATED.   - TUMOR INVADES MUSCULARIS PROPRIA.   - NEGATIVE FOR LYMPHOVASCULAR INVASION.   - SEE COMMENT.   Comment:   To further evaluate the specimen, the following immunohistochemical stains   were performed by [facility] laboratory, on block 1H with   adequate controls and with interpretation as indicated:      CK7:      negative 0030 S>     CK20:      positive      CDX2:      positive      p63:      negative   Primary adenocarcinomas of the bladder  may show significant morphologic and   immunohistochemical overlap with adenocarcinomas from other primary sites.   In this instance, the tumor shows striking morphologic and   immunohistochemical similarity to primary colorectal adenocarcinoma.   Careful correlation with endoscopic and radiographic findings is necessary   for determination of the definitive primary site.     Imaging:      CT Abdomen and pelvis: 10/7/2020    - Partially calcified soft tissue intraluminal lesion of the urinary bladder.  Malignancy least differential.  - heterogeneously enhancing complex 2.2 cm lesion of the right kidney.  - Cyst of the bilateral kidneys.  - hepatic steatosis with focal fatty sparing.  - colonic diverticular disease    CT chest w/o contrast: 1/25/2021  1.  Fatty infiltration of the liver.         2.  No other acute pathology    CT scan A/P: 5/13/2021    - The bladder mass is increased in size compared with CT from August 2020.  Is much carlos of the adjacent aurora-serosal fat may reflect invasion.  - there are several bilateral renal hypodensities, most of which are too small to characterize in terms of attenuation.  This is stable compared with prior exam.  The largest is a cyst in the left lower pole.  One that is indeterminate in the mid to lower pole the right kidney is probably large enough to characterize a cystic or solid by ultrasound.  Consider ultrasound of this lesion is not been evaluated previously.    Labs:  Lab Results   Component Value Date    WBC 8.8 11/16/2023    HGB 11.6 (L) 11/16/2023    HCT 36.4 (L) 11/16/2023    MCV 98.6 (H) 11/16/2023    LABPLAT 293 11/16/2023      Platelets   Date Value Ref Range Status   11/16/2023 293 142 - 424 10*3/uL Final     CMP  Sodium   Date Value Ref Range Status   11/16/2023 136 135 - 145 mmol/L Final     Potassium   Date Value Ref Range Status   11/16/2023 4.3 3.6 - 5.2 mmol/L Final     Chloride   Date Value Ref Range Status   11/16/2023 99 (L) 100 - 108 mmol/L Final     CO2   Date Value Ref Range Status   11/16/2023 31 21 - 32 mmol/L Final     Glucose   Date Value Ref Range Status   11/16/2023 105 70 - 110 mg/dL Final     BUN   Date Value Ref Range Status   11/16/2023 9 7 - 18 mg/dL Final     Creatinine   Date Value Ref Range Status   11/16/2023 1.15 0.70 - 1.30 mg/dL Final     Calcium   Date Value Ref Range Status   11/16/2023 8.8 8.8 - 10.5 mg/dL Final     Total Protein   Date Value Ref Range Status   11/16/2023 7.1 6.4 - 8.2 g/dL Final     Albumin   Date Value Ref Range Status   11/16/2023 3.3 (L) 3.4 - 5.0 g/dL Final     Total Bilirubin   Date Value Ref Range Status   11/16/2023 0.3 0.0 - 1.0 mg/dL Final     Alkaline Phosphatase   Date Value Ref Range Status   11/16/2023 82 46 - 116 U/L Final     AST   Date Value Ref Range Status   11/16/2023 19 15 -  37 U/L Final     ALT   Date Value Ref Range Status   2023 20 12 - 78 U/L Final     Anion Gap   Date Value Ref Range Status   2023 6.0 3.0 - 11.0 mmol/L Final            Past Medical History:   Diagnosis Date    Allergy     Anxiety     Bladder cancer 2021    Coronary artery disease     Depression     Essential (primary) hypertension     GERD (gastroesophageal reflux disease)     Hypercholesterolemia     Paranoid schizophrenia     Type II diabetes mellitus     Urinary tract infection      Family History   Problem Relation Age of Onset    Coronary artery disease Mother     No Known Problems Father     No Known Problems Sister     No Known Problems Sister     No Known Problems Sister     No Known Problems Sister     No Known Problems Sister     Coronary artery disease Brother     Coronary artery disease Brother     Cancer Maternal Grandmother     Skin cancer Maternal Grandmother     Cancer Maternal Aunt     No Known Problems Maternal Uncle     No Known Problems Maternal Uncle     No Known Problems Maternal Uncle     No Known Problems Maternal Uncle     No Known Problems Maternal Uncle     No Known Problems Maternal Uncle     No Known Problems Maternal Uncle     Colon cancer Neg Hx     Colon polyps Neg Hx     Crohn's disease Neg Hx     Liver disease Neg Hx      Social History     Socioeconomic History    Marital status:    Tobacco Use    Smoking status: Former     Current packs/day: 0.00     Average packs/day: 1.5 packs/day for 44.0 years (65.9 ttl pk-yrs)     Types: Cigarettes     Start date: 1977     Quit date: 2020     Years since quittin.8     Passive exposure: Past    Smokeless tobacco: Never    Tobacco comments:     3 cigarettes a day currently 23 DF.    Substance and Sexual Activity    Alcohol use: Not Currently    Drug use: Never     Past Surgical History:   Procedure Laterality Date    ANKLE SURGERY      BLADDER TUMOR EXCISION      2X 2021/ Dec 2022    CORONARY  ANGIOPLASTY WITH STENT PLACEMENT      CYSTOSCOPY      FOOT FRACTURE SURGERY      HAND SURGERY Bilateral     TURBT (TRANSURETHRAL RESECTION OF BLADDER TUMOR)  05/01/2023    WRIST SURGERY           Review of systems:  Review of Systems   Constitutional:  Negative for activity change, appetite change, chills, diaphoresis, fatigue and unexpected weight change.   HENT:  Negative for congestion, facial swelling, hearing loss, mouth sores, trouble swallowing and voice change.    Eyes:  Negative for photophobia, pain, discharge and itching.   Respiratory:  Negative for apnea, cough, choking, chest tightness and shortness of breath.    Cardiovascular:  Negative for chest pain, palpitations and leg swelling.   Gastrointestinal:  Negative for abdominal distention, abdominal pain, anal bleeding and blood in stool.   Endocrine: Negative for cold intolerance, heat intolerance, polydipsia and polyphagia.   Genitourinary:  Negative for difficulty urinating, dysuria, flank pain and hematuria.   Musculoskeletal:  Negative for arthralgias, back pain, joint swelling, myalgias, neck pain and neck stiffness.   Skin:  Negative for color change, pallor and wound.   Allergic/Immunologic: Negative for environmental allergies, food allergies and immunocompromised state.   Neurological:  Negative for dizziness, seizures, facial asymmetry, speech difficulty, light-headedness, numbness and headaches.   Hematological:  Negative for adenopathy. Does not bruise/bleed easily.   Psychiatric/Behavioral:  Negative for agitation, behavioral problems, confusion, decreased concentration and sleep disturbance.              Physical Exam  Vitals and nursing note reviewed.   Constitutional:       General: He is not in acute distress.     Appearance: Normal appearance. He is not ill-appearing.   HENT:      Head: Normocephalic and atraumatic.      Nose: No congestion or rhinorrhea.   Eyes:      General: No scleral icterus.     Extraocular Movements:  Extraocular movements intact.      Pupils: Pupils are equal, round, and reactive to light.   Cardiovascular:      Rate and Rhythm: Normal rate and regular rhythm.      Pulses: Normal pulses.      Heart sounds: Normal heart sounds. No murmur heard.     No gallop.   Pulmonary:      Effort: Pulmonary effort is normal. No respiratory distress.      Breath sounds: Normal breath sounds. No stridor. No wheezing or rhonchi.   Abdominal:      General: Bowel sounds are normal. There is no distension.      Palpations: There is no mass.      Tenderness: There is no abdominal tenderness. There is no guarding.   Musculoskeletal:         General: No swelling, tenderness, deformity or signs of injury. Normal range of motion.      Cervical back: Normal range of motion and neck supple. No rigidity. No muscular tenderness.      Right lower leg: No edema.      Left lower leg: No edema.   Skin:     General: Skin is warm.      Coloration: Skin is not jaundiced or pale.      Findings: No bruising or lesion.   Neurological:      General: No focal deficit present.      Mental Status: He is alert and oriented to person, place, and time.      Cranial Nerves: No cranial nerve deficit.      Sensory: No sensory deficit.      Motor: No weakness.      Gait: Gait normal.   Psychiatric:         Mood and Affect: Mood normal.         Behavior: Behavior normal.         Thought Content: Thought content normal.        There were no vitals filed for this visit.          There is no height or weight on file to calculate BSA.    Assessment/Plan:      ECOG 1    1. Invasive moderately differentiated muscle invasive bladder tumor s/p TURBT 12/21/2020: Stage IIIA at presentation now with Stage IV  Tempus NGS: TP53, ARID1B, KRAS G13D, SMAD4, HIGH TMB noted patient eligible for Keytruda in the future     4/26/23:  Patient presenting to this clinic after a gap of  2 years.  He reports several logistic issues for missing his appointments.  More recently he was seen  by Urology for recurrent bladder cancer and plan for TURBT was made.  However it does not show patient has followed back with Urology.  He also reports getting CT scan done at an outside hospital but I do not have any reports available at this time.  I discussed with him about his several no-shows over the last 2 years and the need to look at dose restaging scan before we decide on our next steps.  I strongly encouraged him to keep his upcoming TURBT appointment with Urology and also showed him the note from urology team where they have attempted to contact him.  == 5/24/23:  Patient underwent repeat TURBT and per Urology note he had multiple recurrences in the bladder which were found to be invasive adenocarcinoma high-grade.  Please see the discussion above where patient has repeatedly missed chemo/radiation appointments in the past and more recently presented after a gap of 2 years.  Patient was seen on 05/11/2023 by Dr. Bro post TURBT and he was referred back to Oncology as he had persistent disease after treatment.  Patient was also discussed in tumor board in I am unsure how we can reliably give any kind of treatment to a patient who does not show up persistently.  I will order a PET scan for restaging and to evaluate the lung nodules noted on recent CT scan.  For a patient who was repeatedly noncompliant and does not show up and disappears during chemotherapy I do not feel I have much to offer at this time.  If he is confirmed stage IV based on PET scan results then palliative gemcitabine and carboplatin followed by avelumab maintenance therapy is a consideration.  PET scan has been ordered for restaging. Also depending on his stage and symptoms-- palliative XRT could have a role but he has had multiple no shows with Rad-onc in the past as well.  == 6/21/23:  PET scan for restaging showed a 2.8 x 5.2 cm mass in the anterior aspect of urinary bladder cavity with foci of amorphous calcification.  Bilateral  few scattered lung nodules with increased FDG activity consistent with metastasis.  Soft tissue fullness with increased FDG activity of left oropharynx.  Patient is status post palliative localized re-irradiation secondary to urinary obstructive symptoms and bleeding.  Discussed with patient in detail future management options for palliative chemo immunotherapy for patients who are cis-platinum eligible and will send prior authorization request for gemcitabine and carboplatin him to be followed by avelumab maintenance therapy.  Will also send Tempus testing at this time  ==07/13/2023 Tempus PDL-1 negative, remaining tempus testing pending.   Pt here today to discuss upcoming chemotherapy, side effect profile, risk versus benefits, and expected outcomes have been discussed today. All questions and concerns answered and consents have been signed.  Labs drawn on 7/11/23. Will start chemotherapy on 7/20/2023.  Port placed, skin wnl, no erythema  == 7/27/23: here today to begin carbo/gemzar. Tempus testing discussed, see above.     History of  St III A Bladder Cancer Diagnosed 12/2020:     -- Discussed with patient further management options for muscle invasive bladder cancer s/p TURBT and the pathology report showing findings with a possible bladder cancer vs colo-rectal cancer primary. If not deemed bladder cancer than no specific role of radiation and will be treated with as metastatic colon cancer.  -- Discussed in TMB meeting 1/21/2020 and consensus decision to obtain C-scopy and if no mass evident on C-scopy, pathology would send for further testing/ ? Second opinion. Referral to GI was placed and he is scheduled to get C-scopy 1/26/2020. C-scopy did not reveal any suspicious lesions which patient delayed getting C-scopy for a long time  -- 3/11/2021: Patient did not present back to clinic until today after his last visit, end of Jan 2021. He also missed his GI appointment which was scheduled urgently at the time.  He reports going through family stressors. I discussed with him and counseled about his non-compliance in coming to appointments. Will obtain restaging scans and will await his C-scopy results. Again counseled not to miss appointments.  -- 5/25/2021:  Patient presents to clinic after a delay of more than 2 months and again does not seem to have a valid reason for causing delay in his own care.  He reports having a recent visit to ER where he was noted to have a urinary tract infection and he also underwent CT scan A/P which just showed increase in size of the bladder mass to approximately 5-6 cm from 4 cm.  I discussed with patient in detail about future management options and also pathology confirming this to be a bladder tumor with pure enteric colonic morphology but no GI primary.  I understand this is a very unreliable patient and might not show up for chemotherapy, hence with the support of our staff we will  involve his family.  == Patient reports that he does not have the resources to undergo any type of radical surgery including a radical cystectomy as he already have transport issues even coming to clinic here and would be unable to go anywhere outside the city.  He would like to pursue with bladder preservation with concurrent chemoradiotherapy. I discussed with him that trimodality therapy (TMT) incorporating maximal TURBT followed by radiation therapy (RT) with concurrent radiosensitizing chemotherapy can be a comparably effective regimen to preserve a functioning bladder in well-selected patients with muscle-invasive bladder cancer.   == Will use 5FU Mitomycin with XRT. He is scheduled to see Rad-onc tomorrow and will need port/IV access prior to starting chemotherapy, pt is unable to get PORT, alternative plan is to get PICC line on Friday and proceed with Gemzar/cisplatin.   --6/18/21: s/p C1D1 &D8 gem/cis with XRT maegan well, mild nausea noted but controlled with prescribed oral antiemetics. Pt has  "noted less hematuria and feels overall "good"   --7/6/21:  Patient is one-week delay for cycle 2 due to transportation difficulties last week.  Patient did maintain most of daily radiation though.  Discussed with patient importance of maintaining chemotherapy appointments and will begin doing weekly labs with Kourtney at Rad[facility].  Patient states feeling pretty good no real side effects current leaf from radiation or chemotherapy to complain of.  Plan is to proceed with cycle 2 day 1 on Thursday.  Return to infusion next Thursday for cycle 2 day 8 and return to clinic in 3 weeks prior to cycle 3  --7/29/21: pt here today to begin cycle 3. He is to complete XRT next week. So far tolerating tx very well. Labs reviewed and no new c/o.   --8/10/21: s/p c1d8, completed xrt this week, doing well, labs reviewed and pt cleared for chemo today.   --9/8/21: pt here in clinic today after 3 week absence due to mother passing. He missed his 4 th of chemo but would like to resume his chemo next week. Labs today reviewed and infusion is notified to call him to schedule C4D1 next week. Pt requesting pain medication refill.   Scans to be scheduled after cycle 4 is completed.   --9/29/21: pt here today after not completing cycle 4 or doing ct scans. Counseled patient regarding need for repeat imaging for active surveillance of disease as well as tapering narcotic pain medication now that he has completed chemo/xrt. Today percocet is changed to Norco 5/325mg #30 and pt understands that this will be his final narcotic prescription provided. Orders placed today for PICC to be pulled at infusion.  He is to RTC in 3 weeks with CT scans.   ==08/03/2023: Patient s/p first carboplatin/gemcitabine cycle.  Reports had some nausea relieved with ondansetron.  Plt 66, will hold 1 week and see patient back with repeat labs  ==08/24/2023: Patient received once cycle Carboplatin/Gemcitabine on 7/27/2023, second cycle held due to " thrombocytopenia with rtc appt made for a week later but patient was unable to make appt due to transportation issues. Pt presented to clinic as walk up with reports of hematuria, sensation of incomplete emptying of bladder. States he saw pcp and was told related to bladder cancer, no bladder infection. SrCr increased from 1.1 to 1.46. He is taking 800 mg ibuprofen 1-2x daily for pain.  Patient also has questions regarding how long he will be on chemotherapy and wanted to be done with all oncologic treatment and in remission by October 2023.  I had conversation with patient that he has stage IV disease and goal of treatment is not curative and patient will be on some form of treatment indefinitely.   ==08/31/23: SrCr Improved, has returned to baseline. Pt received C1D1 Carboplatin/Gemcitabine then did not return to clinic for any further treatment.  Will resume treatment with new cycle.  Reinforced education, no nsaids, encouraged po fluids 6-8 glasses water per day.    ==09/07/23: Chemo well tolerated, pt received fuphila on day 2 of cycle, no s/s infection.  He reports increase in pain, will add MSER 15 mg, educated on avoiding nsaids and mirilax for bowel regimen.  Labs reviewed, pt cleared for chemotherapy  ==10/11/23:  No longer on Morphine as it was giving him homicidal thoughts and he has been evaluated by Psychiatry in the interim who advised him to be off Morphine. He was having a UTI few weeks back and now has completed antibiotics last week. His last Cr function showed decline likely secondary to that. As a precaution we will hold off on Carboplatin this cycle. We will repeat his CMP next week.  == 10/18/23: here today for cycle 3 day 8 gemzar (carbo on hold this cycle due to elevated SrCr). Labs reviewed. Cleared for treatment. SrCr continues to improve.   ==11/16/2023:Last chemotherapy was 10/18/2023. Patient did not show for appt on 11/1/23, cancelled appt on 10/25/2023.  He has only completed 3 cycles  of chemotherapy (21 day cycles) in 6 months.  He reports hematuria, passing blood clots per rectum.  He also passed large tissue mass per rectum.  High suspicion for progression of disease.  Will order CT PET and have patient rtc with MD for results.  Alternatively patient with stage IV disease, has missed many chemotherapy appointments, his main concern at this time is palliation.  Discussed best supportive care with patient and he would like to continue to pursue aggressive treatment, will make his appts  Leukocyte esterase noted per u/a, will treat with cipro.  Will also check Tempus liquid and tissue NCCN recommends checking  FGFR RGQ RT-PCR for FGFR3 or FGFR2 genetic alterations.  Pt inquired about dog heart worm pills as treatment, educated no human based clinical trials on dog dewormer for cancer and may cause serious side effects such as liver injury.  It not recommended for use in humans.    ==2. CAD:  -- S/p stent placement 03/2021 and follows cardiology  -- Stable    3. Cancer related pain  ==Continue hydrocodone 10/325 q 8 hours prn breakthrough pain  ==Miralax for bowel regimen prevention opoid induced constipation  ==11/16/2023 ADDENDUM: Uds resulted after appt completed and was negative for opioids.  Pt filled 12 tablets Oxycodone 7.5/325 3 days ago on 11/13/2023. Patient will have weekly UDS and will only get 1 week pain medication at a time and will only fill AFTER uds positive for opioids.  If at any time patient tests negative for opioids he will no longer receive pain medication with oncology    4. UTI  ==Cipro 500mg po bid x 10 days    Plan:  CT PET  2. Cipro 500mg po bid x 10 days  3. RTC with Dr. Brock in 2 weeks cbc cmp u/a and uds prior  4.Tempus liquid and tissue  5. Home health for weekly and random urine drug screen - no pain medication refills without weekly UDS  6. Oxycodone 1 week refilled - pt is to only revieve 1 week at a time due to violation of pain contract with negative UDS on  11/16/23            Total time spent in counseling and discussion was more than 60 minutes. I discussed in detail further management options at this time and discussion on relevant tests, imaging and ancillary tests as part of the management plan.

## 2023-11-16 NOTE — TELEPHONE ENCOUNTER
On 11/10/23 at 2:43pm I contacted the patient in follow up to his request for pain medication and his complaint made to [facility] concerning provider Damaris Paredes.  Patient expressed that he was having significant pain and in need of medication refill.  He was advised to report to the ER for evaluation and management of his pain.  Patient was concerned that this would be a violation of his pain contract.  I advised that as long as he was not provided more than a 7 day supply of pain medication and kept his upcoming appointment that he would not be in violation.  Patient expressed understanding and stated if his pain worsened he would go to ER as advised.      Patient stated that he does not feel that Damaris jives well with him and does not want her to participate in his care if and when possible. Patient made a request to not have any scheduled appointments with Damaris Paredes, with the only exception to that being that no other providers are available when care is needed.

## 2023-11-20 ENCOUNTER — TELEPHONE (OUTPATIENT)
Dept: HEMATOLOGY/ONCOLOGY | Facility: CLINIC | Age: 58
End: 2023-11-20
Payer: MEDICAID

## 2023-11-20 DIAGNOSIS — C67.8 MALIGNANT NEOPLASM OF OVERLAPPING SITES OF BLADDER: Primary | ICD-10-CM

## 2023-11-20 DIAGNOSIS — N30.01 ACUTE CYSTITIS WITH HEMATURIA: ICD-10-CM

## 2023-11-21 ENCOUNTER — TELEPHONE (OUTPATIENT)
Dept: HEMATOLOGY/ONCOLOGY | Facility: CLINIC | Age: 58
End: 2023-11-21
Payer: MEDICAID

## 2023-11-21 NOTE — TELEPHONE ENCOUNTER
Tried contacting pt about PET scan appt on 11/29/23 at [facility]. Pt did not answer and pts VM is not set up

## 2023-11-22 ENCOUNTER — TELEPHONE (OUTPATIENT)
Dept: HEMATOLOGY/ONCOLOGY | Facility: CLINIC | Age: 58
End: 2023-11-22
Payer: MEDICAID

## 2023-11-22 NOTE — TELEPHONE ENCOUNTER
Unable to reach patient or leave a voicemail on patient's primary contact x2 days.  Called alternate number [phone] and reached patients nephew that he lives with.     Nephew was advised of the appointment for PET scan scheduled on 11/29 at 11am at [facility] and the imperative nature of contacting them at least the day prior for any special instructions, as well as keeping the appointment as scheduled so that results will be available for patient's follow up appointment with provider.       Nephew expressed concerns  of brain mets for patient  based on his recent experience with his own father's cancer journey.  He expressed understanding of the importance of PET and timing and that results should provide answers and insight into his uncles condition and treatment.    TEA 11/22/23 @9:58am

## 2023-11-22 NOTE — TELEPHONE ENCOUNTER
Patient called to confirm scheduling of PET on 11/29 at 11am at [facility].  Phone number to [facility] provided for him to contact for any special instructions.  Patient expressed understanding.     11/22/23 @11:06am

## 2023-11-24 ENCOUNTER — TELEPHONE (OUTPATIENT)
Dept: HEMATOLOGY/ONCOLOGY | Facility: CLINIC | Age: 58
End: 2023-11-24
Payer: MEDICAID

## 2023-11-24 NOTE — TELEPHONE ENCOUNTER
Spoke with Lacie and advised that we will assist with educating patients family on the nature of the Home Health referral and get them in contact to start service.  Advised that patient's chemo is due 11/30, so lab draw could be anytime up to 11/29.  Advised of lab appt in clinic scheduled for 11/29 if needed and that patient has PET scan scheduled on 11/29.  Requested communication regarding ability to schedule and set up HH for medication management and labs as ordered for patient care.    TEA 11/24/23 @ 1:31pm  ----- Message from Sandrita May sent at 11/24/2023  1:05 PM CST -----  Contact: Lacie/ [facility]  Lacie needs a call back  at [phone], Regards to his referral she stated that she reach out to the patient  home and someone else answer the call and advise her that the patient doesn't need home health. And she wants to know has the patient ever mention this to anyone in the office because it wasn't the patient who'd told her this..    Thanks  Td

## 2023-11-27 ENCOUNTER — TELEPHONE (OUTPATIENT)
Dept: HEMATOLOGY/ONCOLOGY | Facility: CLINIC | Age: 58
End: 2023-11-27
Payer: MEDICAID

## 2023-11-27 NOTE — TELEPHONE ENCOUNTER
Patient did not have urine drug screen week of November 20-24.  He was notified when he violated pain contract by testing negative for prescribed opioids that he had to have urine drug screen every week including Nov 20-24 or will be considered failing drug screen.  Patient has had second violation of pain contract and will no longer receive pain medication from oncology.

## 2023-11-28 LAB
DNA RANGE(S) EXAMINED NAR: NORMAL
GENE DIS ANL INTERP-IMP: NORMAL
GENE DIS ASSESSED: NORMAL
REASON FOR STUDY: NORMAL
TEMPUS LCA: NORMAL
TEMPUS PORTAL: NORMAL
TEMPUS TRIAL1: NORMAL
TEMPUS TRIAL2: NORMAL
TEMPUS TRIAL3: NORMAL
TEMPUS TRIALCOUNT: 3

## 2023-11-29 ENCOUNTER — TELEPHONE (OUTPATIENT)
Dept: HEMATOLOGY/ONCOLOGY | Facility: CLINIC | Age: 58
End: 2023-11-29

## 2023-11-29 NOTE — TELEPHONE ENCOUNTER
Clinic notified that Chucho Mack did not show up for scheduled PET scan   Lab and urine done and scanned into chart

## 2023-11-30 ENCOUNTER — TELEPHONE (OUTPATIENT)
Dept: HEMATOLOGY/ONCOLOGY | Facility: CLINIC | Age: 58
End: 2023-11-30

## 2023-11-30 NOTE — TELEPHONE ENCOUNTER
Call placed to patient about missing PET scan. No answer on son's phone and all others are disconnected

## 2023-12-04 LAB
DNA RANGE(S) EXAMINED NAR: NORMAL
GENE DIS ANL INTERP-IMP: POSITIVE
GENE DIS ASSESSED: NORMAL
GENE MUT TESTED BLD/T: 4.7 M/MB
MSI CA SPEC-IMP: NORMAL
PD-L1 BY 22C3 TISS IMSTN DOC: NEGATIVE
REASON FOR STUDY: NORMAL
TEMPUS FUSIONADDENDUM: NORMAL
TEMPUS LCA: NORMAL
TEMPUS PD-L1 (22C3) COMBINED POSITIVE SCORE: <1
TEMPUS PD-L1 (22C3) TUMOR PROPORTION SCORE: <1 %
TEMPUS PORTAL: NORMAL
TEMPUS TRIAL1: NORMAL
TEMPUS TRIAL2: NORMAL
TEMPUS TRIAL3: NORMAL
TEMPUS TRIALCOUNT: 3

## 2023-12-11 ENCOUNTER — TELEPHONE (OUTPATIENT)
Dept: HEMATOLOGY/ONCOLOGY | Facility: CLINIC | Age: 58
End: 2023-12-11
Payer: MEDICAID

## 2023-12-11 NOTE — TELEPHONE ENCOUNTER
Tried calling pt back, sent me straight to  twice        ----- Message from Machelle Goss sent at 12/11/2023 11:17 AM CST -----  Contact: Patient  Patient is calling about having a test run. Please call [phone]

## 2023-12-21 ENCOUNTER — TELEPHONE (OUTPATIENT)
Dept: HEMATOLOGY/ONCOLOGY | Facility: CLINIC | Age: 58
End: 2023-12-21
Payer: MEDICAID

## 2023-12-21 NOTE — TELEPHONE ENCOUNTER
Called pt, one number was unavailable and the other was sent to       ----- Message from Olga Tee NP sent at 12/19/2023  3:00 PM CST -----  He needs his pet scan and then to see Dr. Brock for results.       ----- Message -----  From: Padma Hagen MA  Sent: 12/19/2023   2:06 PM CST  To: Olga Tee NP    Yes ma'am. Drug screens are also in media  ----- Message -----  From: Olga Tee NP  Sent: 12/19/2023   1:48 PM CST  To: Padma Hagen MA    I havent received any weekly urine drug screens.  He also hasn't made his pet scan appts. We need to discuss with Beti tomorrow and may need to send a letter discharging him from clinic and refer him elsewhere.  Maybe Dr. Fernandes in [place] since he lives in [place].  We can discuss with Beti tomorrow      ----- Message -----  From: Padma Hagen MA  Sent: 12/19/2023  11:57 AM CST  To: Olga Tee NP    [facility] states that she has been faxing the results over every week and we have been scanning in chart. Please advise, please call [phone]  ----- Message -----  From: Lissa Johnson  Sent: 12/19/2023  11:34 AM CST  To: [facility] Espino[facility] Staff    Type:  Needs Medical Advice    Who Called: Lacie / [facility]   Symptoms (please be specific): -   How long has patient had these symptoms:  -  Pharmacy name and phone #:  -  Would the patient rather a call back or a response via [facility]?    Best Call Back Number: [phone]   Additional Information:  needs to speak w/ nurse about some lab results please call

## 2023-12-28 ENCOUNTER — TELEPHONE (OUTPATIENT)
Dept: HEMATOLOGY/ONCOLOGY | Facility: CLINIC | Age: 58
End: 2023-12-28

## 2023-12-28 NOTE — TELEPHONE ENCOUNTER
Patient showed up in clinic today around 10AM and was being extremely disruptive in the lobby making threats towards providers and clinic staff. Patient has broken pain contract twice and is non-compliant with treatment and appointments. SUHA barton was called and patient was escorted off of property.

## 2024-01-03 ENCOUNTER — TELEPHONE (OUTPATIENT)
Dept: HEMATOLOGY/ONCOLOGY | Facility: CLINIC | Age: 59
End: 2024-01-03
Payer: MEDICAID

## 2024-01-03 DIAGNOSIS — C67.8 MALIGNANT NEOPLASM OF OVERLAPPING SITES OF BLADDER: Primary | ICD-10-CM

## 2024-01-03 NOTE — TELEPHONE ENCOUNTER
Faxed referral to [facility] oncology per patient request. See attached documentation.

## 2024-01-03 NOTE — PROGRESS NOTES
Letter received from Home Health, pt requests transfer care to [facility]. Referral placed
